# Patient Record
Sex: FEMALE | Race: WHITE | NOT HISPANIC OR LATINO | URBAN - METROPOLITAN AREA
[De-identification: names, ages, dates, MRNs, and addresses within clinical notes are randomized per-mention and may not be internally consistent; named-entity substitution may affect disease eponyms.]

---

## 2017-07-20 ENCOUNTER — OUTPATIENT (OUTPATIENT)
Dept: OUTPATIENT SERVICES | Facility: HOSPITAL | Age: 82
LOS: 1 days | Discharge: HOME | End: 2017-07-20

## 2017-07-20 DIAGNOSIS — I50.9 HEART FAILURE, UNSPECIFIED: ICD-10-CM

## 2017-07-20 DIAGNOSIS — E78.5 HYPERLIPIDEMIA, UNSPECIFIED: ICD-10-CM

## 2017-07-20 DIAGNOSIS — K21.9 GASTRO-ESOPHAGEAL REFLUX DISEASE WITHOUT ESOPHAGITIS: ICD-10-CM

## 2017-07-20 DIAGNOSIS — Z95.0 PRESENCE OF CARDIAC PACEMAKER: ICD-10-CM

## 2017-07-20 DIAGNOSIS — R00.1 BRADYCARDIA, UNSPECIFIED: ICD-10-CM

## 2017-07-20 DIAGNOSIS — I10 ESSENTIAL (PRIMARY) HYPERTENSION: ICD-10-CM

## 2017-07-20 DIAGNOSIS — Z01.818 ENCOUNTER FOR OTHER PREPROCEDURAL EXAMINATION: ICD-10-CM

## 2017-07-20 DIAGNOSIS — D68.8 OTHER SPECIFIED COAGULATION DEFECTS: ICD-10-CM

## 2017-07-20 DIAGNOSIS — R05 COUGH: ICD-10-CM

## 2017-07-20 DIAGNOSIS — M41.9 SCOLIOSIS, UNSPECIFIED: ICD-10-CM

## 2017-07-20 DIAGNOSIS — G62.9 POLYNEUROPATHY, UNSPECIFIED: ICD-10-CM

## 2017-07-20 DIAGNOSIS — Z88.0 ALLERGY STATUS TO PENICILLIN: ICD-10-CM

## 2017-07-26 ENCOUNTER — OUTPATIENT (OUTPATIENT)
Dept: OUTPATIENT SERVICES | Facility: HOSPITAL | Age: 82
LOS: 1 days | Discharge: HOME | End: 2017-07-26

## 2017-07-26 DIAGNOSIS — K21.9 GASTRO-ESOPHAGEAL REFLUX DISEASE WITHOUT ESOPHAGITIS: ICD-10-CM

## 2017-07-26 DIAGNOSIS — I10 ESSENTIAL (PRIMARY) HYPERTENSION: ICD-10-CM

## 2017-07-26 DIAGNOSIS — R00.1 BRADYCARDIA, UNSPECIFIED: ICD-10-CM

## 2017-07-26 DIAGNOSIS — I50.9 HEART FAILURE, UNSPECIFIED: ICD-10-CM

## 2017-07-26 DIAGNOSIS — Z88.0 ALLERGY STATUS TO PENICILLIN: ICD-10-CM

## 2017-07-26 DIAGNOSIS — E78.5 HYPERLIPIDEMIA, UNSPECIFIED: ICD-10-CM

## 2017-07-26 DIAGNOSIS — M41.9 SCOLIOSIS, UNSPECIFIED: ICD-10-CM

## 2017-07-26 DIAGNOSIS — G62.9 POLYNEUROPATHY, UNSPECIFIED: ICD-10-CM

## 2017-07-26 DIAGNOSIS — Z45.02 ENCOUNTER FOR ADJUSTMENT AND MANAGEMENT OF AUTOMATIC IMPLANTABLE CARDIAC DEFIBRILLATOR: ICD-10-CM

## 2017-07-26 DIAGNOSIS — R05 COUGH: ICD-10-CM

## 2018-10-03 ENCOUNTER — APPOINTMENT (OUTPATIENT)
Dept: UROLOGY | Facility: CLINIC | Age: 83
End: 2018-10-03

## 2019-05-15 ENCOUNTER — APPOINTMENT (OUTPATIENT)
Dept: CARDIOLOGY | Facility: CLINIC | Age: 84
End: 2019-05-15
Payer: MEDICARE

## 2019-05-15 PROCEDURE — 99213 OFFICE O/P EST LOW 20 MIN: CPT | Mod: 25

## 2019-05-15 PROCEDURE — 93290 INTERROG DEV EVAL ICPMS IP: CPT | Mod: 59

## 2019-05-15 PROCEDURE — 93284 PRGRMG EVAL IMPLANTABLE DFB: CPT | Mod: 59

## 2019-06-17 ENCOUNTER — EMERGENCY (EMERGENCY)
Facility: HOSPITAL | Age: 84
LOS: 0 days | Discharge: HOME | End: 2019-06-18
Attending: EMERGENCY MEDICINE | Admitting: EMERGENCY MEDICINE
Payer: MEDICARE

## 2019-06-17 VITALS
SYSTOLIC BLOOD PRESSURE: 175 MMHG | HEART RATE: 67 BPM | DIASTOLIC BLOOD PRESSURE: 90 MMHG | RESPIRATION RATE: 18 BRPM | OXYGEN SATURATION: 99 % | TEMPERATURE: 98 F

## 2019-06-17 DIAGNOSIS — E78.5 HYPERLIPIDEMIA, UNSPECIFIED: ICD-10-CM

## 2019-06-17 DIAGNOSIS — T14.90XA INJURY, UNSPECIFIED, INITIAL ENCOUNTER: ICD-10-CM
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
## 2019-06-17 DIAGNOSIS — I10 ESSENTIAL (PRIMARY) HYPERTENSION: ICD-10-CM
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
## 2019-06-17 DIAGNOSIS — R42 DIZZINESS AND GIDDINESS: ICD-10-CM

## 2019-06-17 DIAGNOSIS — S50.312A ABRASION OF LEFT ELBOW, INITIAL ENCOUNTER: ICD-10-CM

## 2019-06-17 DIAGNOSIS — Z95.0 PRESENCE OF CARDIAC PACEMAKER: ICD-10-CM
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
## 2019-06-17 DIAGNOSIS — S79.822A: ICD-10-CM

## 2019-06-17 LAB
ALBUMIN SERPL ELPH-MCNC: 4.4 G/DL — SIGNIFICANT CHANGE UP (ref 3.5–5.2)
ALP SERPL-CCNC: 51 U/L — SIGNIFICANT CHANGE UP (ref 30–115)
ALT FLD-CCNC: 18 U/L — SIGNIFICANT CHANGE UP (ref 0–41)
ANION GAP SERPL CALC-SCNC: 12 MMOL/L — SIGNIFICANT CHANGE UP (ref 7–14)
APPEARANCE UR: CLEAR — SIGNIFICANT CHANGE UP
APTT BLD: 32.1 SEC — SIGNIFICANT CHANGE UP (ref 27–39.2)
AST SERPL-CCNC: 22 U/L — SIGNIFICANT CHANGE UP (ref 0–41)
BACTERIA # UR AUTO: ABNORMAL /HPF
BASOPHILS # BLD AUTO: 0.08 K/UL — SIGNIFICANT CHANGE UP (ref 0–0.2)
BASOPHILS NFR BLD AUTO: 0.9 % — SIGNIFICANT CHANGE UP (ref 0–1)
BILIRUB SERPL-MCNC: 0.5 MG/DL — SIGNIFICANT CHANGE UP (ref 0.2–1.2)
BILIRUB UR-MCNC: NEGATIVE — SIGNIFICANT CHANGE UP
BUN SERPL-MCNC: 32 MG/DL — HIGH (ref 10–20)
CALCIUM SERPL-MCNC: 11.7 MG/DL — HIGH (ref 8.5–10.1)
CHLORIDE SERPL-SCNC: 102 MMOL/L — SIGNIFICANT CHANGE UP (ref 98–110)
CO2 SERPL-SCNC: 25 MMOL/L — SIGNIFICANT CHANGE UP (ref 17–32)
COLOR SPEC: YELLOW — SIGNIFICANT CHANGE UP
CREAT SERPL-MCNC: 0.9 MG/DL — SIGNIFICANT CHANGE UP (ref 0.7–1.5)
DIFF PNL FLD: NEGATIVE — SIGNIFICANT CHANGE UP
EOSINOPHIL # BLD AUTO: 0.16 K/UL — SIGNIFICANT CHANGE UP (ref 0–0.7)
EOSINOPHIL NFR BLD AUTO: 1.7 % — SIGNIFICANT CHANGE UP (ref 0–8)
EPI CELLS # UR: ABNORMAL /HPF
ETHANOL SERPL-MCNC: <10 MG/DL — SIGNIFICANT CHANGE UP
GLUCOSE SERPL-MCNC: 114 MG/DL — HIGH (ref 70–99)
GLUCOSE UR QL: NEGATIVE MG/DL — SIGNIFICANT CHANGE UP
HCT VFR BLD CALC: 38.3 % — SIGNIFICANT CHANGE UP (ref 37–47)
HGB BLD-MCNC: 12.8 G/DL — SIGNIFICANT CHANGE UP (ref 12–16)
IMM GRANULOCYTES NFR BLD AUTO: 0.2 % — SIGNIFICANT CHANGE UP (ref 0.1–0.3)
INR BLD: 0.96 RATIO — SIGNIFICANT CHANGE UP (ref 0.65–1.3)
KETONES UR-MCNC: NEGATIVE — SIGNIFICANT CHANGE UP
LACTATE SERPL-SCNC: 0.7 MMOL/L — SIGNIFICANT CHANGE UP (ref 0.5–2.2)
LEUKOCYTE ESTERASE UR-ACNC: NEGATIVE — SIGNIFICANT CHANGE UP
LIDOCAIN IGE QN: 82 U/L — HIGH (ref 7–60)
LYMPHOCYTES # BLD AUTO: 1.48 K/UL — SIGNIFICANT CHANGE UP (ref 1.2–3.4)
LYMPHOCYTES # BLD AUTO: 16 % — LOW (ref 20.5–51.1)
MCHC RBC-ENTMCNC: 30.1 PG — SIGNIFICANT CHANGE UP (ref 27–31)
MCHC RBC-ENTMCNC: 33.4 G/DL — SIGNIFICANT CHANGE UP (ref 32–37)
MCV RBC AUTO: 90.1 FL — SIGNIFICANT CHANGE UP (ref 81–99)
MONOCYTES # BLD AUTO: 0.51 K/UL — SIGNIFICANT CHANGE UP (ref 0.1–0.6)
MONOCYTES NFR BLD AUTO: 5.5 % — SIGNIFICANT CHANGE UP (ref 1.7–9.3)
NEUTROPHILS # BLD AUTO: 7.02 K/UL — HIGH (ref 1.4–6.5)
NEUTROPHILS NFR BLD AUTO: 75.7 % — HIGH (ref 42.2–75.2)
NITRITE UR-MCNC: NEGATIVE — SIGNIFICANT CHANGE UP
NRBC # BLD: 0 /100 WBCS — SIGNIFICANT CHANGE UP (ref 0–0)
PH UR: 6.5 — SIGNIFICANT CHANGE UP (ref 5–8)
PLATELET # BLD AUTO: 199 K/UL — SIGNIFICANT CHANGE UP (ref 130–400)
POTASSIUM SERPL-MCNC: 3.9 MMOL/L — SIGNIFICANT CHANGE UP (ref 3.5–5)
POTASSIUM SERPL-SCNC: 3.9 MMOL/L — SIGNIFICANT CHANGE UP (ref 3.5–5)
PROT SERPL-MCNC: 7.8 G/DL — SIGNIFICANT CHANGE UP (ref 6–8)
PROT UR-MCNC: 30 MG/DL
PROTHROM AB SERPL-ACNC: 11 SEC — SIGNIFICANT CHANGE UP (ref 9.95–12.87)
RBC # BLD: 4.25 M/UL — SIGNIFICANT CHANGE UP (ref 4.2–5.4)
RBC # FLD: 13.4 % — SIGNIFICANT CHANGE UP (ref 11.5–14.5)
SODIUM SERPL-SCNC: 139 MMOL/L — SIGNIFICANT CHANGE UP (ref 135–146)
SP GR SPEC: 1.02 — SIGNIFICANT CHANGE UP (ref 1.01–1.03)
TROPONIN T SERPL-MCNC: <0.01 NG/ML — SIGNIFICANT CHANGE UP
UROBILINOGEN FLD QL: 0.2 MG/DL — SIGNIFICANT CHANGE UP (ref 0.2–0.2)
WBC # BLD: 9.27 K/UL — SIGNIFICANT CHANGE UP (ref 4.8–10.8)
WBC # FLD AUTO: 9.27 K/UL — SIGNIFICANT CHANGE UP (ref 4.8–10.8)

## 2019-06-17 PROCEDURE — 73070 X-RAY EXAM OF ELBOW: CPT | Mod: 26,LT

## 2019-06-17 PROCEDURE — 73560 X-RAY EXAM OF KNEE 1 OR 2: CPT | Mod: 26,LT

## 2019-06-17 PROCEDURE — 93010 ELECTROCARDIOGRAM REPORT: CPT

## 2019-06-17 PROCEDURE — 99285 EMERGENCY DEPT VISIT HI MDM: CPT | Mod: GC,25

## 2019-06-17 PROCEDURE — 72125 CT NECK SPINE W/O DYE: CPT | Mod: 26

## 2019-06-17 PROCEDURE — 71045 X-RAY EXAM CHEST 1 VIEW: CPT | Mod: 26

## 2019-06-17 PROCEDURE — 73552 X-RAY EXAM OF FEMUR 2/>: CPT | Mod: 26,LT

## 2019-06-17 PROCEDURE — 73030 X-RAY EXAM OF SHOULDER: CPT | Mod: 26,LT

## 2019-06-17 PROCEDURE — 73502 X-RAY EXAM HIP UNI 2-3 VIEWS: CPT | Mod: 26,LT

## 2019-06-17 PROCEDURE — 70450 CT HEAD/BRAIN W/O DYE: CPT | Mod: 26

## 2019-06-17 PROCEDURE — 73060 X-RAY EXAM OF HUMERUS: CPT | Mod: 26,LT

## 2019-06-17 RX ORDER — SODIUM CHLORIDE 9 MG/ML
1000 INJECTION INTRAMUSCULAR; INTRAVENOUS; SUBCUTANEOUS ONCE
Refills: 0 | Status: COMPLETED | OUTPATIENT
Start: 2019-06-17 | End: 2019-06-17

## 2019-06-17 RX ORDER — ACETAMINOPHEN 500 MG
650 TABLET ORAL ONCE
Refills: 0 | Status: COMPLETED | OUTPATIENT
Start: 2019-06-17 | End: 2019-06-17

## 2019-06-17 RX ADMIN — Medication 650 MILLIGRAM(S): at 22:57

## 2019-06-17 RX ADMIN — SODIUM CHLORIDE 333.33 MILLILITER(S): 9 INJECTION INTRAMUSCULAR; INTRAVENOUS; SUBCUTANEOUS at 21:05

## 2019-06-17 NOTE — ED PROVIDER NOTE - CARE PLAN
Principal Discharge DX:	Fall from standing  Secondary Diagnosis:	Lightheaded Principal Discharge DX:	Fall from standing  Secondary Diagnosis:	Dizziness

## 2019-06-17 NOTE — ED PROVIDER NOTE - PHYSICAL EXAMINATION
Vital Signs: I have reviewed the initial vital signs.  Constitutional: NAD, well-nourished, appears stated age, no acute distress.  HEENT: Airway patent, moist MM, no erythema/swelling/deformity of oral structures. EOMI, PERRLA.  CV: regular rate, regular rhythm, well-perfused extremities, 2+ b/l DP and radial pulses equal.  Lungs: BCTA, no increased WOB.  ABD: NTND, no guarding or rebound, no pulsatile mass, no hernias.   MSK: Neck supple, nontender, nl ROM, no stepoff. Chest nontender. Back nontender in TLS spine or to b/l bony structures or flanks. Ext nontender, nl rom, no deformity.   INTEG: Skin warm, dry, no rash. +L lateral thigh eccymosis and hematoma. L elbow abrasion and ecchymosis  NEURO: A&Ox3, moving all extremities, normal speech  PSYCH: Calm, cooperative, normal affect and interaction.

## 2019-06-17 NOTE — ED ADULT NURSE NOTE - NSIMPLEMENTINTERV_GEN_ALL_ED
Implemented All Fall with Harm Risk Interventions:  [place] to call system. Call bell, personal items and telephone within reach. Instruct patient to call for assistance. Room bathroom lighting operational. Non-slip footwear when patient is off stretcher. Physically safe environment: no spills, clutter or unnecessary equipment. Stretcher in lowest position, wheels locked, appropriate side rails in place. Provide visual cue, wrist band, yellow gown, etc. Monitor gait and stability. Monitor for mental status changes and reorient to person, place, and time. Review medications for side effects contributing to fall risk. Reinforce activity limits and safety measures with patient and family. Provide visual clues: red socks.

## 2019-06-17 NOTE — ED PROVIDER NOTE - NS ED MD EKG INTERPRETATION 1
Transition of care performed with sharing of clinical summary sinus @102, normal axis/interval, normal st/t

## 2019-06-17 NOTE — ED ADULT NURSE NOTE - OBJECTIVE STATEMENT
Pt BIBA s/p fall today, pt states she felt dizzy when she was walking towards door for [employer] and fell on top of her walker, pt complains of pain to left arm. pt denies any sob/chest pain. PT currently a&ox4, speaking coherently, complains of continuos dizziness, denies head injury or loc, pt picked up by musa and niece. pt on cardiac monitor, continuos pulse ox, lab work done and iv inserted. pt presents with ecchymosis to left hip. will continue to monitor.

## 2019-06-17 NOTE — ED PROVIDER NOTE - CLINICAL SUMMARY MEDICAL DECISION MAKING FREE TEXT BOX
89f w dizziness and fall. nontoxic appearing, n/v intact, nonfocal neuro. Labs, EKG, & imaging reviewed. Analgesia & IV fluids given with improvement in symptoms. Patient admitted to EDOU for cardiac eval & further care/management.

## 2019-06-17 NOTE — ED PROVIDER NOTE - OBJECTIVE STATEMENT
89yF with PMH pacemaker for bradycardia (remote), HTN, HLD, BL knee replacement and L hip replacement p/w fall. pt stood up to get to door around 5pm and felt "woozy", walked over and then legs gave out. she told her daughter she didn't feel good upon arrival and decision was made to come to ed. no AC, no HT, no LOC. +L lateral thigh pain and ecchymosis and L elbow pain and ecchymosis/abrasion. no neck pain. currently asx. no cp or abnormal pacer firing. no palpitations 89yF with PMH pacemaker for bradycardia (remote), HTN, HLD, BL knee replacement and L hip replacement p/w fall. pt stood up to get to door around 5pm and felt "woozy", walked over and then legs gave out. she told her daughter she didn't feel good upon arrival and decision was made to come to ed. no AC, no HT, no LOC. +L lateral thigh pain and ecchymosis and L elbow pain and ecchymosis/abrasion. no neck pain. currently asx. no cp or abnormal pacer firing. no palpitations. card - Bekheit/divore

## 2019-06-17 NOTE — ED ADULT NURSE NOTE - PMH
AICD (automatic cardioverter/defibrillator) present    High cholesterol    HTN (hypertension)    Pacemaker

## 2019-06-17 NOTE — ED PROVIDER NOTE - ATTENDING CONTRIBUTION TO CARE
89f w HTN, HLD, PPM for bradycardia presents after getting up to open door and feeling lightheaded and buckling. Pt reports that she landed on her L side w injury to L femur & humerus. Pain is sharp, moderate, constant, no radiating, no exacerbating/alleviating. Pt on ASA. Pt reports feeling persistently dizzy. No head/neck injury, no actual LOC.    Review of Systems  Constitutional:  No fever or chills.  Eyes:  No visual changes, eye pain, or discharge.  ENMT:  No nasal congestion, discharge, or throat pain.   Cardiac:  No chest pain, syncope, or edema.  Respiratory:  No dyspnea, wheezing, or cough. No hemoptysis.  GI:  No vomiting, diarrhea, or abdominal pain. No melena or hematochezia.  :  No dysuria or hematuria.   Musculoskeletal:  See HPI  Skin:  No skin rash, jaundice, or lesions.  Neuro:  No headache, loss of sensation, or focal weakness.  No change in mental status. +Dizziness    Physical Exam  General: Awake, alert, NAD, elderly/frail, NCAT, no skull/facial tender, no step-offs, no raccoon/muniz, non-toxic appearing  Eyes: PERRL, EOMI, no nystagmus/icterus, lids and conjunctivae are normal  ENT: External inspection normal, pink/moist membranes, pharynx normal  CV: S1S2, regular rate and rhythm, no murmur/gallops/rubs, no JVD, 2+ pulses b/l, no edema/cords/homans, warm/well-perfused  Respiratory: Normal respiratory rate/effort, no respiratory distress, normal voice, speaking full sentences, lungs clear to auscultation b/l, no wheezing/rales/rhonchi, no retractions, no stridor  Abdomen: Soft abdomen, no tender/distended/guarding/rebound, no CVA tender  Musculoskeletal: FROM all 4 extremities, N/V intact, pelvis stable, no TLS spinal tender/deform/step-offs, L humerus mid-/swelling, no deformity. L femur mid-/swelling, no deformity. No other hayde tender/deform  Neck: FROM neck, supple, no meningismus, trachea midline, no JVD, no cspine tender/step-offs  Integumentary: Color normal for race, warm and dry, no rash  Neuro: Oriented x3, CN 2-12 grossly intact, normal motor, normal sensory, normal cerebellar, GCS15  Psych: Oriented x3, mood normal, affect normal     89f w dizziness and fall. nontoxic appearing, n/v intact, nonfocal neuro. --Labs, EKG, CT's, XR's. --IV fluids, Analgesia/antiemetics as needed, observe/re-assess.

## 2019-06-18 VITALS
RESPIRATION RATE: 18 BRPM | SYSTOLIC BLOOD PRESSURE: 145 MMHG | HEART RATE: 65 BPM | TEMPERATURE: 98 F | OXYGEN SATURATION: 99 % | DIASTOLIC BLOOD PRESSURE: 88 MMHG

## 2019-06-18 LAB
BLD GP AB SCN SERPL QL: SIGNIFICANT CHANGE UP
TROPONIN T SERPL-MCNC: <0.01 NG/ML — SIGNIFICANT CHANGE UP

## 2019-06-18 PROCEDURE — 99234 HOSP IP/OBS SM DT SF/LOW 45: CPT

## 2019-06-18 PROCEDURE — 93010 ELECTROCARDIOGRAM REPORT: CPT

## 2019-06-18 PROCEDURE — 93306 TTE W/DOPPLER COMPLETE: CPT | Mod: 26

## 2019-06-18 NOTE — ED CDU PROVIDER INITIAL DAY NOTE - NEURO NEGATIVE STATEMENT, MLM
no loss of consciousness, no gait abnormality, no headache, no sensory deficits, and no weakness. + dizziness.

## 2019-06-18 NOTE — ED ADULT NURSE REASSESSMENT NOTE - NS ED NURSE REASSESS COMMENT FT1
Patient resting comfortably in bed. VSS. Denies any pain or discomfort at this time. Education provided on importance of using CB prior to getting OOB. Patient verbalized understanding of teaching. MA on. Will continue to monitor.
pt moved into cc6 as OBS pt. attached to cardiac and resp monitoring. pt fed and placed on hospital bed. offers no complaints after tylenol. family aware. will continue care.
pt resting in bed comfortably at this time. sleeping, remains on cardiac and resp monitoring. VS stable. will continue care.

## 2019-06-18 NOTE — ED CDU PROVIDER DISPOSITION NOTE - CARE PROVIDER_API CALL
Matias Aguilar)  Cardiology; Interventional Cardiology  [address], [address]  [place] [address]  Phone: [phone]  Fax: [phone]  Follow Up Time:     Sakina Helm)  Cardiology; Internal Medicine  [address] [place] [address], [facility] [address]  [place] [address]  Phone: [phone]  Fax: [phone]  Follow Up Time:

## 2019-06-18 NOTE — ED CDU PROVIDER INITIAL DAY NOTE - OBJECTIVE STATEMENT
88 y/o female with pmh of ppm, htn, hld, pt. states she was at home, she got up to go open the door and she started to feel dizzy, her legs gave out and she fell on her left side. pt. denies head injury, loc, not on anticoagulation. denies cp, sob, nausea, vomiting. cardiologist dr. Geller and dr. Concepcion

## 2019-06-18 NOTE — ED CDU PROVIDER DISPOSITION NOTE - CLINICAL COURSE
Patient was sent to EDOU for further evaluation of dizziness anstatus post fall, Has remained in no distress and with stable vitals, ekgs paced rhythm, enzymes negative, Multiple X- rays no fracture, CT head and CT C- spine negative, Patient feelin better and dizziness better, discharged in stable condition and with family membres

## 2019-10-09 ENCOUNTER — APPOINTMENT (OUTPATIENT)
Dept: CARDIOLOGY | Facility: CLINIC | Age: 84
End: 2019-10-09
Payer: MEDICARE

## 2019-10-09 PROBLEM — E78.00 PURE HYPERCHOLESTEROLEMIA, UNSPECIFIED: Chronic | Status: ACTIVE | Noted: 2019-06-17

## 2019-10-09 PROBLEM — I10 ESSENTIAL (PRIMARY) HYPERTENSION: Chronic | Status: ACTIVE | Noted: 2019-06-17

## 2019-10-09 PROBLEM — Z95.0 PRESENCE OF CARDIAC PACEMAKER: Chronic | Status: ACTIVE | Noted: 2019-06-17

## 2019-10-09 PROBLEM — Z95.810 PRESENCE OF AUTOMATIC (IMPLANTABLE) CARDIAC DEFIBRILLATOR: Chronic | Status: ACTIVE | Noted: 2019-06-17

## 2019-10-09 PROCEDURE — 99213 OFFICE O/P EST LOW 20 MIN: CPT | Mod: 25

## 2019-10-09 PROCEDURE — 93290 INTERROG DEV EVAL ICPMS IP: CPT | Mod: 59

## 2019-10-09 PROCEDURE — 93284 PRGRMG EVAL IMPLANTABLE DFB: CPT | Mod: 59

## 2019-10-23 ENCOUNTER — APPOINTMENT (OUTPATIENT)
Dept: UROLOGY | Facility: CLINIC | Age: 84
End: 2019-10-23
Payer: MEDICARE

## 2019-10-23 DIAGNOSIS — Z78.9 OTHER SPECIFIED HEALTH STATUS: ICD-10-CM

## 2019-10-23 PROCEDURE — 99204 OFFICE O/P NEW MOD 45 MIN: CPT

## 2019-12-04 ENCOUNTER — APPOINTMENT (OUTPATIENT)
Dept: UROLOGY | Facility: CLINIC | Age: 84
End: 2019-12-04

## 2019-12-04 ENCOUNTER — OUTPATIENT (OUTPATIENT)
Dept: OUTPATIENT SERVICES | Facility: HOSPITAL | Age: 84
LOS: 1 days | Discharge: HOME | End: 2019-12-04

## 2019-12-04 DIAGNOSIS — E11.9 TYPE 2 DIABETES MELLITUS WITHOUT COMPLICATIONS: ICD-10-CM

## 2019-12-04 DIAGNOSIS — I25.10 ATHEROSCLEROTIC HEART DISEASE OF NATIVE CORONARY ARTERY WITHOUT ANGINA PECTORIS: ICD-10-CM

## 2019-12-04 DIAGNOSIS — R78.89 FINDING OF OTHER SPECIFIED SUBSTANCES, NOT NORMALLY FOUND IN BLOOD: ICD-10-CM

## 2019-12-04 DIAGNOSIS — Z02.9 ENCOUNTER FOR ADMINISTRATIVE EXAMINATIONS, UNSPECIFIED: ICD-10-CM

## 2019-12-04 DIAGNOSIS — B58.81 TOXOPLASMA MYOCARDITIS: ICD-10-CM

## 2019-12-09 ENCOUNTER — OUTPATIENT (OUTPATIENT)
Dept: OUTPATIENT SERVICES | Facility: HOSPITAL | Age: 84
LOS: 1 days | Discharge: HOME | End: 2019-12-09

## 2019-12-09 DIAGNOSIS — E11.9 TYPE 2 DIABETES MELLITUS WITHOUT COMPLICATIONS: ICD-10-CM

## 2019-12-09 DIAGNOSIS — R78.89 FINDING OF OTHER SPECIFIED SUBSTANCES, NOT NORMALLY FOUND IN BLOOD: ICD-10-CM

## 2019-12-09 DIAGNOSIS — B58.81 TOXOPLASMA MYOCARDITIS: ICD-10-CM

## 2019-12-09 DIAGNOSIS — I25.10 ATHEROSCLEROTIC HEART DISEASE OF NATIVE CORONARY ARTERY WITHOUT ANGINA PECTORIS: ICD-10-CM

## 2020-01-15 ENCOUNTER — APPOINTMENT (OUTPATIENT)
Dept: CARDIOLOGY | Facility: CLINIC | Age: 85
End: 2020-01-15

## 2020-05-04 ENCOUNTER — APPOINTMENT (OUTPATIENT)
Dept: CARDIOLOGY | Facility: CLINIC | Age: 85
End: 2020-05-04

## 2020-07-20 ENCOUNTER — RECORD ABSTRACTING (OUTPATIENT)
Age: 85
End: 2020-07-20

## 2020-09-28 ENCOUNTER — APPOINTMENT (OUTPATIENT)
Dept: CARDIOLOGY | Facility: CLINIC | Age: 85
End: 2020-09-28
Payer: MEDICARE

## 2020-09-28 VITALS — SYSTOLIC BLOOD PRESSURE: 131 MMHG | DIASTOLIC BLOOD PRESSURE: 79 MMHG

## 2020-09-28 VITALS
HEIGHT: 61 IN | WEIGHT: 134 LBS | HEART RATE: 60 BPM | DIASTOLIC BLOOD PRESSURE: 80 MMHG | SYSTOLIC BLOOD PRESSURE: 140 MMHG | BODY MASS INDEX: 25.3 KG/M2 | TEMPERATURE: 98.4 F

## 2020-09-28 DIAGNOSIS — Z00.00 ENCOUNTER FOR GENERAL ADULT MEDICAL EXAMINATION W/OUT ABNORMAL FINDINGS: ICD-10-CM

## 2020-09-28 PROCEDURE — 93000 ELECTROCARDIOGRAM COMPLETE: CPT

## 2020-09-28 PROCEDURE — 93290 INTERROG DEV EVAL ICPMS IP: CPT | Mod: 26

## 2020-09-28 PROCEDURE — 99213 OFFICE O/P EST LOW 20 MIN: CPT | Mod: 25

## 2020-09-28 RX ORDER — CARVEDILOL 3.12 MG/1
TABLET, FILM COATED ORAL
Refills: 0 | Status: DISCONTINUED | COMMUNITY
End: 2020-09-28

## 2020-09-28 RX ORDER — ESOMEPRAZOLE MAGNESIUM 5 MG/1
GRANULE, DELAYED RELEASE ORAL
Refills: 0 | Status: DISCONTINUED | COMMUNITY
End: 2020-09-28

## 2020-09-28 RX ORDER — SOLIFENACIN SUCCINATE 5 MG/1
5 TABLET ORAL
Qty: 30 | Refills: 5 | Status: DISCONTINUED | COMMUNITY
Start: 2019-10-23 | End: 2020-09-28

## 2020-09-28 NOTE — ASSESSMENT
[FreeTextEntry1] : EKG AFIB 60/MIN BIV PACING\par NO EVENTS\par CONTROLLED BP \par PACING 99.6%\par NO ON ANTICOAGULATION B/C OF RECURRENT FALLS AND PATIENT ALSO REFUSED TO TAKE ANTICOAGULATION\par \par CONTINUE THE SAME\par F/U 6 MONTHS

## 2020-09-28 NOTE — HISTORY OF PRESENT ILLNESS
[de-identified] : PPM MEDTRONIC FOR CHB 2010\par A.FIB SINCE 2012medtronic 2010\par UPGRADED TO BIV SEP 19 TH 2012\par C/O ARTHTHRITIS\par HISTORY OF RECURRENT FALLS ,? CONFUSION WHEN WAS ON CYMBLTA\par DID NOT START ANTICOAGULATION BECAUSE OF FALLS 5/21/14\par NO C/O TODAY 10/1/14\par AGAIN DISCUSSED ANTICOGULATION WITH NIECE AND RISKS OF STROKES,PT REFUSED\par LEG WEAKNESS TIREDNESS IN AM LOW BACK PAIN 1/12/15\par C/O LEG WEAKNESS UNABLE TO WALK 6/15/15\par CONTIUES TO C/O LEG WEAKNESS AND STATES WHEN GETS OOB IN AM AND GETS WASHED UP BECOMES VERY TIRED 11/16/15\par ANKLE SWELLING BUT NO CARDIAC C/O 3/14/16\par POST FALL WHILE TRYING TO GRAP ON A CHAIR 3 WEEKS AGO,NOT PRECEDE NOR FOLLOWED BY LOSS OF CONSCIOSNESS,DENIES ANY CARDIAC C/O TODAY OK 8-31-16\par \par PT FEELING SAD - HAD TO PUT DOG TO SLEEP, STRUGGLING WITH INCONTINENCE 12-14-16\par \par NO CARDIAC C/O 4/12/17\par \par C/O LEG SWELLING OTHERWISE NO CARDIAC C/O 7/12/17\par \par NO CARDIAC C/O 9/18/17\par \par POST R HAND CARPAL TUNNEL SURGERY DEC 6TH 2017 NO CARDIAC C/O 1/31/18\par \par NO CARDIAC C/O 5/14/18\par \par NO C/O 9/17/18\par \par MINOR CHEST PAIN 1/14/19\par \par TIRED IN AM NO CARDIAC C/O 5/15/19\par \par NO CARDIAC C/O BUT HAD RECURRENT FALLS 3 TIMES SINCE HER LAST VISIT LOSING BALANCE BUT NO DIZZINESS 10/09/19 \par \par C/O BACK PAIN NO CARDIAC C/O   9/28/20

## 2020-09-28 NOTE — PROCEDURE
[Complete Heart Block] : complete heart block [CRT-D] : Cardiac resynchronization therapy defibrillator [VVIR] : VVIR [Longevity: ___ months] : The estimated remaining battery life is [unfilled] months [Sensing Amplitude ___mv] : sensing amplitude was [unfilled] mv [Lead Imp:  ___ohms] : lead impedance was [unfilled] ohms [___V @] : [unfilled] V [___ ms] : [unfilled] ms [Sense ___ %] : Sense [unfilled]% [Pace ___ %] : Pace [unfilled]% [de-identified] : MEDTRONIC [de-identified] : VIVA XT CRTD [de-identified] : [id number] [de-identified] : 7-26-17 [de-identified] : 60 [de-identified] : no events

## 2020-09-28 NOTE — PHYSICAL EXAM
[General Appearance - Well Developed] : well developed [Normal Appearance] : normal appearance [Well Groomed] : well groomed [General Appearance - Well Nourished] : well nourished [No Deformities] : no deformities [General Appearance - In No Acute Distress] : no acute distress [Systolic grade ___/6] : A grade [unfilled]/6 systolic murmur was heard. [] : no respiratory distress [Respiration, Rhythm And Depth] : normal respiratory rhythm and effort [Exaggerated Use Of Accessory Muscles For Inspiration] : no accessory muscle use [Auscultation Breath Sounds / Voice Sounds] : lungs were clear to auscultation bilaterally [FreeTextEntry1] : STASIS +++ VARICOSE VEINS++

## 2021-04-05 ENCOUNTER — APPOINTMENT (OUTPATIENT)
Dept: CARDIOLOGY | Facility: CLINIC | Age: 86
End: 2021-04-05

## 2021-05-12 ENCOUNTER — APPOINTMENT (OUTPATIENT)
Dept: CARDIOLOGY | Facility: CLINIC | Age: 86
End: 2021-05-12
Payer: MEDICARE

## 2021-05-12 ENCOUNTER — NON-APPOINTMENT (OUTPATIENT)
Age: 86
End: 2021-05-12

## 2021-05-12 PROCEDURE — 93295 DEV INTERROG REMOTE 1/2/MLT: CPT

## 2021-05-12 PROCEDURE — 93296 REM INTERROG EVL PM/IDS: CPT

## 2021-05-27 ENCOUNTER — APPOINTMENT (OUTPATIENT)
Dept: GERIATRICS | Facility: HOME HEALTH | Age: 86
End: 2021-05-27
Payer: MEDICARE

## 2021-05-27 DIAGNOSIS — Z78.9 OTHER SPECIFIED HEALTH STATUS: ICD-10-CM

## 2021-05-27 DIAGNOSIS — Z87.891 PERSONAL HISTORY OF NICOTINE DEPENDENCE: ICD-10-CM

## 2021-05-27 PROCEDURE — 99442: CPT | Mod: 95

## 2021-05-27 RX ORDER — GABAPENTIN 300 MG/1
300 CAPSULE ORAL
Refills: 0 | Status: DISCONTINUED | COMMUNITY
End: 2021-05-27

## 2021-05-27 RX ORDER — SIMVASTATIN 80 MG/1
TABLET, FILM COATED ORAL
Refills: 0 | Status: DISCONTINUED | COMMUNITY
End: 2021-05-27

## 2021-05-27 NOTE — END OF VISIT
[] : Resident [FreeTextEntry3] : REVIEWED WITH CANDICE TEAM; and also called nephew directly - Amaris at [phone]; he coordinates responsibilities for care of pt with cousin Yamilka who is niece at [phone] number above; patient hemodynamically stable and is overall feelilng well; will plan for going for in person visit this summer.; concur with diagnoses and treatment plan above.

## 2021-05-27 NOTE — ASSESSMENT
[FreeTextEntry1] : This is a 89 year old F patient with past medical Hx of Afib s/p Biv ICD/PPM, DLD and urinary incontinence wanted to establish care.\par \par #AFib s/p Biv ICD\par -Patient used to follow up with Dr. Winter \par -Last interrogation was in 9/2020. Almost entirely dependent on the pacemaker \par -Continue Coreg for now \par -Not on AC for recurrent Falls \par \par #HTN\par -Continue Coreg 25 mg BID \par -Continue Diuretics\par -Blood pressure readings at home. 150s systolic, would not increase the BP medications \par \par #DLD\par -Continue Statin for now \par \par #Urinary incontinence \par -Its functional incontinence, no intervention from urology stand point\par \par #HCM\par -Follow up in 1 year\par -Blood work up before next visit

## 2021-05-27 NOTE — HISTORY OF PRESENT ILLNESS
[Home] : at home, [unfilled] , at the time of the visit. [Medical Office: ([facility])___] : at the medical office located in  [Other:____] : [unfilled] [FreeTextEntry1] : This is a 89 year old F patient with past medical Hx of Afib s/p Biv ICD/PPM, DLD and urinary incontinence wanted to establish care. Patient is feeling well with no acute complaints

## 2021-05-27 NOTE — REVIEW OF SYSTEMS
[Incontinence] : incontinence [Fever] : no fever [Chills] : no chills [Feeling Poorly] : not feeling poorly [Feeling Tired] : not feeling tired [Abdominal Pain] : no abdominal pain [Vomiting] : no vomiting [Constipation] : no constipation [Dysuria] : no dysuria [Pelvic Pain] : no pelvic pain

## 2021-06-16 ENCOUNTER — APPOINTMENT (OUTPATIENT)
Dept: CARDIOLOGY | Facility: CLINIC | Age: 86
End: 2021-06-16
Payer: MEDICARE

## 2021-06-16 VITALS
WEIGHT: 120 LBS | TEMPERATURE: 97.9 F | SYSTOLIC BLOOD PRESSURE: 173 MMHG | HEART RATE: 60 BPM | HEIGHT: 61 IN | BODY MASS INDEX: 22.66 KG/M2 | DIASTOLIC BLOOD PRESSURE: 82 MMHG

## 2021-06-16 DIAGNOSIS — I48.91 UNSPECIFIED ATRIAL FIBRILLATION: ICD-10-CM

## 2021-06-16 PROCEDURE — 93284 PRGRMG EVAL IMPLANTABLE DFB: CPT

## 2021-06-16 PROCEDURE — 93290 INTERROG DEV EVAL ICPMS IP: CPT | Mod: 26

## 2021-06-16 PROCEDURE — 99214 OFFICE O/P EST MOD 30 MIN: CPT

## 2021-06-16 PROCEDURE — 93000 ELECTROCARDIOGRAM COMPLETE: CPT | Mod: 59

## 2021-06-16 NOTE — ASSESSMENT
[FreeTextEntry1] : # Chronic AFib \par -  We discussed initiation of anticoagulation but niece and pt do not wish to start OAC and understand that the patient is at risk for stroke.\par - Cont Aspirin\par \par # CHB s/p BiV ICD\par -  Capped RA lead. Normal functioning BiV ICD. \par - Remote monitor is set up and patient is transmitting. Optivol elevated. Does not appear vol overloaded currently. \par - Repeat echo if none done recently. If EF is low, consider adding ACEi.\par \par # HTN\par - BP elevated\par - 2g Na diet enforced\par - Follow up with cardiologist\par \par I have also advised the patient to go to the nearest emergency room if she experiences any chest pain, dyspnea, syncope, or has any other compelling symptoms.\par \par Follow up in 6 mo for in office interrogation with NP.

## 2021-06-16 NOTE — PHYSICAL EXAM
[Normal Appearance] : normal appearance [Well Groomed] : well groomed [No Deformities] : no deformities [General Appearance - In No Acute Distress] : no acute distress [Heart Rate And Rhythm] : heart rate and rhythm were normal [Heart Sounds] : normal S1 and S2 [Edema] : no peripheral edema present [] : no respiratory distress [Exaggerated Use Of Accessory Muscles For Inspiration] : no accessory muscle use [Respiration, Rhythm And Depth] : normal respiratory rhythm and effort [Auscultation Breath Sounds / Voice Sounds] : lungs were clear to auscultation bilaterally [Left Infraclavicular] : left infraclavicular area [Well-Healed] : well-healed [Abdomen Soft] : soft [Nail Clubbing] : no clubbing of the fingernails [FreeTextEntry1] : sitting in wheelchair

## 2021-06-16 NOTE — PROCEDURE
[Complete Heart Block] : complete heart block [CRT-D] : Cardiac resynchronization therapy defibrillator [VVIR] : VVIR [Longevity: ___ months] : The estimated remaining battery life is [unfilled] months [Threshold Testing Performed] : Threshold testing was performed [Sensing Amplitude ___mv] : sensing amplitude was [unfilled] mv [Lead Imp:  ___ohms] : lead impedance was [unfilled] ohms [___V @] : [unfilled] V [___ ms] : [unfilled] ms [Programmed for Longevity] : output reprogrammed for improved battery longevity [Pace ___ %] : Pace [unfilled]% [See Device Printout] : See device printout [de-identified] : AFib [de-identified] : Medtronic [de-identified] : [id number] [de-identified] : [id number] [de-identified] : 7/26/2017 [de-identified] : 60 [de-identified] : NO EVENTS.\par Pulmonary congestion elevated since Jan 26 and ongoing

## 2021-06-16 NOTE — HISTORY OF PRESENT ILLNESS
[de-identified] : \par Cardiologist: Dr. Aguilar\par \par 89 yo F with history of CHB s/p DC PPM (4/30/2010) s/p BiV ICD upgrade (9/19/2012 by Dr. Cordova with capped RA and RV pacing lead), s/p BiV ICD gen change (2017) chronic AFib (not on AC due to history of recurrent falls), here for routine BiV ICD interrogation.

## 2021-06-25 ENCOUNTER — APPOINTMENT (OUTPATIENT)
Dept: GERIATRICS | Facility: HOME HEALTH | Age: 86
End: 2021-06-25
Payer: MEDICARE

## 2021-06-25 VITALS
TEMPERATURE: 98.2 F | HEART RATE: 60 BPM | OXYGEN SATURATION: 98 % | RESPIRATION RATE: 18 BRPM | DIASTOLIC BLOOD PRESSURE: 62 MMHG | SYSTOLIC BLOOD PRESSURE: 112 MMHG

## 2021-06-25 DIAGNOSIS — Z86.79 PERSONAL HISTORY OF OTHER DISEASES OF THE CIRCULATORY SYSTEM: ICD-10-CM

## 2021-06-25 PROCEDURE — 99348 HOME/RES VST EST LOW MDM 30: CPT

## 2021-06-25 NOTE — PHYSICAL EXAM
[General Appearance - Alert] : alert [General Appearance - In No Acute Distress] : in no acute distress [General Appearance - Well Nourished] : well nourished [General Appearance - Well Developed] : well developed [Sclera] : the sclera and conjunctiva were normal [Normal Oral Mucosa] : normal oral mucosa [No Oral Pallor] : no oral pallor [No Oral Cyanosis] : no oral cyanosis [Outer Ear] : the ears and nose were normal in appearance [Hearing Threshold Finger Rub Not [place]] : hearing was normal [Examination Of The Oral Cavity] : the lips and gums were normal [Neck Appearance] : the appearance of the neck was normal [Neck Cervical Mass (___cm)] : no neck mass was observed [Jugular Venous Distention Increased] : there was no jugular-venous distention [Respiration, Rhythm And Depth] : normal respiratory rhythm and effort [Auscultation Breath Sounds / Voice Sounds] : lungs were clear to auscultation bilaterally [Exaggerated Use Of Accessory Muscles For Inspiration] : no accessory muscle use [Edema] : there was no peripheral edema [Abdomen Soft] : soft [Abdomen Tenderness] : non-tender [Nail Clubbing] : no clubbing  or cyanosis of the fingernails [Involuntary Movements] : no involuntary movements were seen [Skin Lesions] : no skin lesions [] : no rash [Cranial Nerves] : cranial nerves 2-12 were intact [No Focal Deficits] : no focal deficits [FreeTextEntry1] : Confused

## 2021-06-25 NOTE — ASSESSMENT
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 Nahun Guido

## 2021-06-25 NOTE — HISTORY OF PRESENT ILLNESS
[FreeTextEntry1] : Patient seen and examined at home.  Patient is homebound 2/2 generalized weakness/debility.  Nephew and HHA present during visit.  All report patient at baseline.  No acute complaints.  No CP/SOB. No abdominal complaints with good appetite and regular BM's.  No urinary complaints.  No recent falls.  No skin wounds.  Patient is WC bound.  Medications reviewed and reconciled. Nephew relating s/s of AD, confusion, agitation at times.  Patient answers questions but is A/O x 2.  Needs Neuro FU

## 2021-08-04 ENCOUNTER — APPOINTMENT (OUTPATIENT)
Dept: GERIATRICS | Facility: HOME HEALTH | Age: 86
End: 2021-08-04
Payer: MEDICARE

## 2021-08-04 VITALS
OXYGEN SATURATION: 97 % | SYSTOLIC BLOOD PRESSURE: 114 MMHG | DIASTOLIC BLOOD PRESSURE: 62 MMHG | TEMPERATURE: 97.6 F | RESPIRATION RATE: 16 BRPM | HEART RATE: 62 BPM

## 2021-08-04 DIAGNOSIS — F98.0 ENURESIS NOT DUE TO A SUBSTANCE OR KNOWN PHYSIOLOGICAL CONDITION: ICD-10-CM

## 2021-08-04 DIAGNOSIS — R39.15 URGENCY OF URINATION: ICD-10-CM

## 2021-08-04 PROCEDURE — 99348 HOME/RES VST EST LOW MDM 30: CPT

## 2021-08-04 NOTE — ASSESSMENT
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  Will not follow regularly \par - c/w PT/OT\par

## 2021-08-04 NOTE — PHYSICAL EXAM
[Well Nourished] : well nourished [Well Developed] : well developed [Normal Oral Mucosa] : normal oral mucosa [No Oral Pallor] : no oral pallor [No Oral Cyanosis] : no oral cyanosis [Normal Outer Ear/Nose] : the ears and nose were normal in appearance [Normal Lips/Gums] : the lips and gums were normal [Edema] : edema was not present [Abdomen Tenderness] : non-tender [Abdomen Soft] : soft [No Clubbing, Cyanosis] : no clubbing or cyanosis of the fingernails [Involuntary Movements] : no involuntary movements were seen [] : no rash [Skin Lesions] : no skin lesions [No Focal Deficits] : no focal deficits [Cranial Nerves Intact] : cranial nerves 2-12 were intact [Normal] : the appearance was normal, neck was supple [JVD] : there was jugular-venous distention [de-identified] : irr irr [de-identified] : Alert and answers questions

## 2021-08-04 NOTE — HISTORY OF PRESENT ILLNESS
[FreeTextEntry1] : Patient seen and examined at home.  Patient is homebound 2/2 generalized weakness/debility.  HHA and Son present during visit.  All report patient at baseline.  No acute complaints.  No CP/SOB. No abdominal complaints with good appetite and regular BM's.  No urinary complaints.  No recent falls.  No skin wounds.  Followed with Neuro, no medication changes.

## 2021-08-11 ENCOUNTER — APPOINTMENT (OUTPATIENT)
Dept: CARDIOLOGY | Facility: CLINIC | Age: 86
End: 2021-08-11
Payer: MEDICARE

## 2021-08-11 ENCOUNTER — NON-APPOINTMENT (OUTPATIENT)
Age: 86
End: 2021-08-11

## 2021-08-11 PROCEDURE — 93298 REM INTERROG DEV EVAL SCRMS: CPT

## 2021-08-11 PROCEDURE — G2066: CPT

## 2021-10-26 ENCOUNTER — APPOINTMENT (OUTPATIENT)
Dept: NEUROLOGY | Facility: CLINIC | Age: 86
End: 2021-10-26

## 2021-10-29 ENCOUNTER — APPOINTMENT (OUTPATIENT)
Dept: GERIATRICS | Facility: HOME HEALTH | Age: 86
End: 2021-10-29

## 2021-11-02 ENCOUNTER — NEW REFERRAL (OUTPATIENT)
Dept: URBAN - METROPOLITAN AREA CLINIC 109 | Facility: CLINIC | Age: 86
End: 2021-11-02

## 2021-11-02 DIAGNOSIS — D31.31: ICD-10-CM

## 2021-11-02 DIAGNOSIS — H35.3223: ICD-10-CM

## 2021-11-02 DIAGNOSIS — H35.3211: ICD-10-CM

## 2021-11-02 DIAGNOSIS — H35.033: ICD-10-CM

## 2021-11-02 DIAGNOSIS — H43.813: ICD-10-CM

## 2021-11-02 DIAGNOSIS — H35.433: ICD-10-CM

## 2021-11-02 PROCEDURE — 92202 OPSCPY EXTND ON/MAC DRAW: CPT

## 2021-11-02 PROCEDURE — 76512 OPH US DX B-SCAN: CPT

## 2021-11-02 PROCEDURE — 99204 OFFICE O/P NEW MOD 45 MIN: CPT

## 2021-11-02 PROCEDURE — 92134 CPTRZ OPH DX IMG PST SGM RTA: CPT

## 2021-11-02 ASSESSMENT — VISUAL ACUITY
OD_SC: 3/200
OS_SC: 10/200

## 2021-11-02 ASSESSMENT — TONOMETRY
OS_IOP_MMHG: 14
OD_IOP_MMHG: 12

## 2021-11-03 ENCOUNTER — FOLLOW UP (OUTPATIENT)
Dept: URBAN - METROPOLITAN AREA CLINIC 51 | Facility: CLINIC | Age: 86
End: 2021-11-03

## 2021-11-03 DIAGNOSIS — H35.3211: ICD-10-CM

## 2021-11-03 DIAGNOSIS — H43.11: ICD-10-CM

## 2021-11-03 DIAGNOSIS — D31.31: ICD-10-CM

## 2021-11-03 DIAGNOSIS — H35.033: ICD-10-CM

## 2021-11-03 DIAGNOSIS — H35.3223: ICD-10-CM

## 2021-11-03 DIAGNOSIS — H35.433: ICD-10-CM

## 2021-11-03 DIAGNOSIS — H43.813: ICD-10-CM

## 2021-11-03 PROCEDURE — 92202 OPSCPY EXTND ON/MAC DRAW: CPT

## 2021-11-03 PROCEDURE — 92012 INTRM OPH EXAM EST PATIENT: CPT

## 2021-11-03 ASSESSMENT — TONOMETRY
OS_IOP_MMHG: 14
OD_IOP_MMHG: 15

## 2021-11-03 ASSESSMENT — VISUAL ACUITY
OS_SC: 10/200
OD_SC: 1/200

## 2021-11-08 ENCOUNTER — APPOINTMENT (OUTPATIENT)
Dept: GERIATRICS | Facility: HOME HEALTH | Age: 86
End: 2021-11-08

## 2021-11-15 ENCOUNTER — APPOINTMENT (OUTPATIENT)
Dept: GERIATRICS | Facility: HOME HEALTH | Age: 86
End: 2021-11-15
Payer: MEDICARE

## 2021-11-15 DIAGNOSIS — K59.09 OTHER CONSTIPATION: ICD-10-CM

## 2021-11-15 DIAGNOSIS — R41.89 OTHER SYMPTOMS AND SIGNS INVOLVING COGNITIVE FUNCTIONS AND AWARENESS: ICD-10-CM

## 2021-11-15 PROCEDURE — 99214 OFFICE O/P EST MOD 30 MIN: CPT | Mod: 95,GC

## 2021-11-16 NOTE — REVIEW OF SYSTEMS
[Eyesight Problems] : eyesight problems [Negative] : Heme/Lymph [FreeTextEntry3] : decreased vision in right eye

## 2021-11-16 NOTE — HISTORY OF PRESENT ILLNESS
[Home] : at home, [unfilled] , at the time of the visit. [Medical Office: ([facility])___] : at the medical office located in  [Formal Caregiver] : formal caregiver [Verbal consent obtained from patient] : the patient, [unfilled] [] : Patient is incontinent. [Completely Dependent] : Completely dependent. [FreeTextEntry1] : Patient seen and examined at home.  Patient is homebound 2/2 generalized weakness/debility.  HHA Arin during visit.  All report patient at baseline.  No acute complaints.  No CP/SOB. No abdominal complaints with good appetite and regular BM's.  No urinary complaints.  No recent falls.  No skin wounds. She is decreased vision in right vision for which she has ophthalmologist outside, AS per HHA she is due for a cataract surgery

## 2021-11-19 ENCOUNTER — APPOINTMENT (OUTPATIENT)
Dept: CARDIOLOGY | Facility: CLINIC | Age: 86
End: 2021-11-19
Payer: MEDICARE

## 2021-11-19 ENCOUNTER — NON-APPOINTMENT (OUTPATIENT)
Age: 86
End: 2021-11-19

## 2021-11-19 PROCEDURE — 93298 REM INTERROG DEV EVAL SCRMS: CPT

## 2021-11-19 PROCEDURE — G2066: CPT

## 2021-12-01 ENCOUNTER — SURGERY/PROCEDURE (OUTPATIENT)
Dept: URBAN - METROPOLITAN AREA MEDICAL CENTER 2 | Facility: MEDICAL CENTER | Age: 86
End: 2021-12-01

## 2021-12-01 DIAGNOSIS — H43.11: ICD-10-CM

## 2021-12-01 PROCEDURE — 67036 REMOVAL OF INNER EYE FLUID: CPT

## 2021-12-02 ENCOUNTER — 1 DAY POST-OP (OUTPATIENT)
Dept: URBAN - METROPOLITAN AREA CLINIC 51 | Facility: CLINIC | Age: 86
End: 2021-12-02

## 2021-12-02 DIAGNOSIS — H35.3211: ICD-10-CM

## 2021-12-02 DIAGNOSIS — H43.11: ICD-10-CM

## 2021-12-02 DIAGNOSIS — H35.433: ICD-10-CM

## 2021-12-02 DIAGNOSIS — H35.033: ICD-10-CM

## 2021-12-02 DIAGNOSIS — D31.31: ICD-10-CM

## 2021-12-02 DIAGNOSIS — H35.3223: ICD-10-CM

## 2021-12-02 DIAGNOSIS — H43.813: ICD-10-CM

## 2021-12-02 PROCEDURE — 99024 POSTOP FOLLOW-UP VISIT: CPT

## 2021-12-02 PROCEDURE — 92202 OPSCPY EXTND ON/MAC DRAW: CPT

## 2021-12-02 ASSESSMENT — TONOMETRY
OD_IOP_MMHG: 12
OS_IOP_MMHG: 15

## 2021-12-02 ASSESSMENT — VISUAL ACUITY
OD_SC: 5/200
OS_SC: 10/200

## 2021-12-08 ENCOUNTER — POST-OP CHECK (OUTPATIENT)
Dept: URBAN - METROPOLITAN AREA CLINIC 109 | Facility: CLINIC | Age: 86
End: 2021-12-08

## 2021-12-08 DIAGNOSIS — H35.433: ICD-10-CM

## 2021-12-08 DIAGNOSIS — H35.3211: ICD-10-CM

## 2021-12-08 DIAGNOSIS — H43.11: ICD-10-CM

## 2021-12-08 DIAGNOSIS — H35.033: ICD-10-CM

## 2021-12-08 DIAGNOSIS — H35.3223: ICD-10-CM

## 2021-12-08 DIAGNOSIS — H43.813: ICD-10-CM

## 2021-12-08 PROCEDURE — 92202 OPSCPY EXTND ON/MAC DRAW: CPT

## 2021-12-08 PROCEDURE — 92134 CPTRZ OPH DX IMG PST SGM RTA: CPT

## 2021-12-08 PROCEDURE — PFS EYLEA PFS

## 2021-12-08 PROCEDURE — 99024 POSTOP FOLLOW-UP VISIT: CPT | Mod: 25

## 2021-12-08 PROCEDURE — 92250 FUNDUS PHOTOGRAPHY W/I&R: CPT

## 2021-12-08 PROCEDURE — 67028 INJECTION EYE DRUG: CPT

## 2021-12-08 ASSESSMENT — TONOMETRY
OD_IOP_MMHG: 13
OS_IOP_MMHG: 18

## 2021-12-08 ASSESSMENT — VISUAL ACUITY
OD_SC: 20/125
OD_PH: 20/125+1

## 2022-01-03 ENCOUNTER — APPOINTMENT (OUTPATIENT)
Dept: GERIATRICS | Facility: HOME HEALTH | Age: 87
End: 2022-01-03
Payer: MEDICARE

## 2022-01-03 VITALS
DIASTOLIC BLOOD PRESSURE: 62 MMHG | TEMPERATURE: 97.2 F | HEART RATE: 66 BPM | RESPIRATION RATE: 18 BRPM | OXYGEN SATURATION: 98 % | SYSTOLIC BLOOD PRESSURE: 118 MMHG

## 2022-01-03 DIAGNOSIS — R26.9 UNSPECIFIED ABNORMALITIES OF GAIT AND MOBILITY: ICD-10-CM

## 2022-01-03 DIAGNOSIS — Z23 ENCOUNTER FOR IMMUNIZATION: ICD-10-CM

## 2022-01-03 PROCEDURE — 99348 HOME/RES VST EST LOW MDM 30: CPT

## 2022-01-03 RX ORDER — LACTULOSE 10 G/15ML
10 SOLUTION ORAL DAILY
Qty: 1 | Refills: 3 | Status: DISCONTINUED | COMMUNITY
Start: 2021-10-03 | End: 2022-01-03

## 2022-01-03 NOTE — PHYSICAL EXAM
[Alert] : alert [No Acute Distress] : in no acute distress [PERRL] : pupils were equal in size, round, and reactive to light [No Strabismus] : no strabismus was seen [No Oral Pallor] : no oral pallor [No Oral Cyanosis] : no oral cyanosis [Normal Outer Ear/Nose] : the ears and nose were normal in appearance [Normal Appearance] : the appearance of the neck was normal [Supple] : the neck was supple [No Respiratory Distress] : no respiratory distress [No Acc Muscle Use] : no accessory muscle use [Respiration, Rhythm And Depth] : normal respiratory rhythm and effort [Auscultation Breath Sounds / Voice Sounds] : lungs were clear to auscultation bilaterally [Heart Rate And Rhythm] : heart rate was normal and rhythm regular [Edema] : edema was not present [Pedal Pulses Normal] : the pedal pulses are present [Bowel Sounds] : normal bowel sounds [Abdomen Tenderness] : non-tender [Abdomen Soft] : soft [No Spinal Tenderness] : no spinal tenderness [Normal Color / Pigmentation] : normal skin color and pigmentation [Normal Turgor] : normal skin turgor [No Focal Deficits] : no focal deficits [Normal Affect] : the affect was normal [Normal Mood] : the mood was normal

## 2022-01-05 NOTE — HISTORY OF PRESENT ILLNESS
[FreeTextEntry1] : Pt is a 89 yr old female seen for a flu visit, caregiver present during the visit. pt  is doing well  presents no new complaints, spoke with nephew and requested to have PT. pt denies CP/discomfort, on senna and lactulose for constipation, urinating well,  Appetite/hydration good. use walker with assistance with caregiver. Flu vaccine given today.

## 2022-01-05 NOTE — ASSESSMENT
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

## 2022-02-18 ENCOUNTER — NON-APPOINTMENT (OUTPATIENT)
Age: 87
End: 2022-02-18

## 2022-02-18 ENCOUNTER — APPOINTMENT (OUTPATIENT)
Dept: CARDIOLOGY | Facility: CLINIC | Age: 87
End: 2022-02-18
Payer: MEDICARE

## 2022-02-18 PROCEDURE — 93295 DEV INTERROG REMOTE 1/2/MLT: CPT

## 2022-02-18 PROCEDURE — 93296 REM INTERROG EVL PM/IDS: CPT

## 2022-03-21 ENCOUNTER — RX RENEWAL (OUTPATIENT)
Age: 87
End: 2022-03-21

## 2022-04-06 ENCOUNTER — APPOINTMENT (OUTPATIENT)
Dept: CARDIOLOGY | Facility: CLINIC | Age: 87
End: 2022-04-06
Payer: MEDICARE

## 2022-04-06 VITALS
SYSTOLIC BLOOD PRESSURE: 125 MMHG | HEIGHT: 63 IN | BODY MASS INDEX: 21.97 KG/M2 | TEMPERATURE: 97.3 F | HEART RATE: 60 BPM | WEIGHT: 124 LBS | DIASTOLIC BLOOD PRESSURE: 73 MMHG

## 2022-04-06 PROCEDURE — 93283 PRGRMG EVAL IMPLANTABLE DFB: CPT

## 2022-04-06 PROCEDURE — 93000 ELECTROCARDIOGRAM COMPLETE: CPT | Mod: 59

## 2022-04-06 RX ORDER — GABAPENTIN 100 MG/1
100 CAPSULE ORAL 3 TIMES DAILY
Refills: 0 | Status: COMPLETED | COMMUNITY
End: 2022-04-06

## 2022-07-07 ENCOUNTER — NON-APPOINTMENT (OUTPATIENT)
Age: 87
End: 2022-07-07

## 2022-07-07 ENCOUNTER — APPOINTMENT (OUTPATIENT)
Dept: CARDIOLOGY | Facility: CLINIC | Age: 87
End: 2022-07-07

## 2022-07-07 PROCEDURE — 93295 DEV INTERROG REMOTE 1/2/MLT: CPT

## 2022-07-07 PROCEDURE — 93296 REM INTERROG EVL PM/IDS: CPT

## 2022-09-21 ENCOUNTER — RX RENEWAL (OUTPATIENT)
Age: 87
End: 2022-09-21

## 2022-10-06 ENCOUNTER — APPOINTMENT (OUTPATIENT)
Dept: CARDIOLOGY | Facility: CLINIC | Age: 87
End: 2022-10-06

## 2022-10-06 ENCOUNTER — NON-APPOINTMENT (OUTPATIENT)
Age: 87
End: 2022-10-06

## 2022-10-06 PROCEDURE — 93295 DEV INTERROG REMOTE 1/2/MLT: CPT

## 2022-10-06 PROCEDURE — 93296 REM INTERROG EVL PM/IDS: CPT

## 2023-01-05 ENCOUNTER — APPOINTMENT (OUTPATIENT)
Dept: CARDIOLOGY | Facility: CLINIC | Age: 88
End: 2023-01-05
Payer: MEDICARE

## 2023-01-05 ENCOUNTER — NON-APPOINTMENT (OUTPATIENT)
Age: 88
End: 2023-01-05

## 2023-01-05 PROCEDURE — 93295 DEV INTERROG REMOTE 1/2/MLT: CPT

## 2023-01-05 PROCEDURE — 93296 REM INTERROG EVL PM/IDS: CPT

## 2023-01-26 ENCOUNTER — RX RENEWAL (OUTPATIENT)
Age: 88
End: 2023-01-26

## 2023-04-05 ENCOUNTER — APPOINTMENT (OUTPATIENT)
Dept: ELECTROPHYSIOLOGY | Facility: CLINIC | Age: 88
End: 2023-04-05
Payer: MEDICARE

## 2023-04-05 VITALS
RESPIRATION RATE: 16 BRPM | HEIGHT: 63 IN | BODY MASS INDEX: 21.26 KG/M2 | DIASTOLIC BLOOD PRESSURE: 76 MMHG | TEMPERATURE: 97.1 F | WEIGHT: 120 LBS | SYSTOLIC BLOOD PRESSURE: 122 MMHG | HEART RATE: 59 BPM

## 2023-04-05 PROCEDURE — 93000 ELECTROCARDIOGRAM COMPLETE: CPT | Mod: 59

## 2023-04-05 PROCEDURE — 93290 INTERROG DEV EVAL ICPMS IP: CPT | Mod: 26

## 2023-04-05 PROCEDURE — 93284 PRGRMG EVAL IMPLANTABLE DFB: CPT

## 2023-04-12 NOTE — CARDIOLOGY SUMMARY
[de-identified] : 4/5/2023 - 59 atrial flutter ,V paced \par  4/6/2022) 60 bpm  paced  RBB 178ms [de-identified] : 7/26/2017 - MEDT CRT-D

## 2023-04-12 NOTE — PROCEDURE
[Complete Heart Block] : complete heart block [See Device Printout] : See device printout [CRT-D] : Cardiac resynchronization therapy defibrillator [VVIR] : VVIR [Longevity: ___ months] : The estimated remaining battery life is [unfilled] months [Threshold Testing Performed] : Threshold testing was performed [Sensing Amplitude ___mv] : sensing amplitude was [unfilled] mv [___V @] : [unfilled] V [___ ms] : [unfilled] ms [Programmed for Longevity] : output reprogrammed for improved battery longevity [Pace ___ %] : Pace [unfilled]% [Lead Imp:  ___ohms] : lead impedance was [unfilled] ohms [de-identified] : AFib [de-identified] : Medtronic [de-identified] : [id number] [de-identified] : [id number] [de-identified] : 7/26/2017 [de-identified] : 60 [de-identified] : NO EVENTS.\par total  99.9\par optivol shows no fluid congestion

## 2023-04-12 NOTE — PROCEDURE
[Complete Heart Block] : complete heart block [See Device Printout] : See device printout [CRT-D] : Cardiac resynchronization therapy defibrillator [VVIR] : VVIR [Longevity: ___ months] : The estimated remaining battery life is [unfilled] months [Threshold Testing Performed] : Threshold testing was performed [Sensing Amplitude ___mv] : sensing amplitude was [unfilled] mv [___V @] : [unfilled] V [___ ms] : [unfilled] ms [Programmed for Longevity] : output reprogrammed for improved battery longevity [Pace ___ %] : Pace [unfilled]% [Lead Imp:  ___ohms] : lead impedance was [unfilled] ohms [de-identified] : AFib [de-identified] : Medtronic [de-identified] : [id number] [de-identified] : [id number] [de-identified] : 7/26/2017 [de-identified] : 60 [de-identified] : NO EVENTS.\par total  99.9\par optivol shows no fluid congestion

## 2023-04-12 NOTE — CARDIOLOGY SUMMARY
[de-identified] : \par  4/6/2022) 60 bpm  paced  RBB 178ms [de-identified] : 7/26/2017 - MEDT CRT-D

## 2023-04-12 NOTE — ASSESSMENT
[FreeTextEntry1] : \par # Chronic AFib \par -  Revisited initiation of anticoagulation but nephew and pt do not wish to start OAC .Both understands the risks and benefits\par - Cont Aspirin\par \par # CHB s/p BiV ICD\par -  Capped RA lead. Normal functioning BiV ICD. \par - Remote monitor is set up and patient is transmitting. Optivol - no congestion \par - remaining battery life 7 months\par \par # HTN- controlled\par - 2g Na diet enforced\par - Follow up with cardiologist\par \par I have also advised the patient to go to the nearest emergency room if she experiences any chest pain, dyspnea, syncope, or has any other compelling symptoms.\par \par Follow up in 5 mo for in office interrogation with NP.

## 2023-04-12 NOTE — CARDIOLOGY SUMMARY
[de-identified] : 4/5/2023 - 59 atrial flutter ,V paced \par  4/6/2022) 60 bpm  paced  RBB 178ms [de-identified] : 7/26/2017 - MEDT CRT-D

## 2023-04-12 NOTE — HISTORY OF PRESENT ILLNESS
[de-identified] : \par Cardiologist: Dr. Aguilar\par \par 89 yo F with history of CHB s/p DC PPM (4/30/2010) s/p BiV ICD upgrade (9/19/2012 by Dr. Cordova with capped RA and RV pacing lead), s/p BiV ICD gen change (2017) chronic AFib (not on AC due to history of recurrent falls), here for routine BiV ICD interrogation. \par

## 2023-04-12 NOTE — HISTORY OF PRESENT ILLNESS
[de-identified] : \par Cardiologist: Dr. Aguilar\par \par 89 yo F with history of CHB s/p DC PPM (4/30/2010) s/p BiV ICD upgrade (9/19/2012 by Dr. Cordova with capped RA and RV pacing lead), s/p BiV ICD gen change (2017) chronic AFib (not on AC due to history of recurrent falls), here for routine BiV ICD interrogation. \par

## 2023-04-12 NOTE — HISTORY OF PRESENT ILLNESS
[de-identified] : \par Cardiologist: Dr. Aguilar\par \par 89 yo F with history of CHB s/p DC PPM (4/30/2010) s/p BiV ICD upgrade (9/19/2012 by Dr. Cordova with capped RA and RV pacing lead), s/p BiV ICD gen change (2017) chronic AFib (not on AC due to history of recurrent falls), here for routine BiV ICD interrogation. \par

## 2023-04-12 NOTE — PHYSICAL EXAM
[Normal Appearance] : normal appearance [Well Groomed] : well groomed [No Deformities] : no deformities [General Appearance - In No Acute Distress] : no acute distress [Heart Rate And Rhythm] : heart rate and rhythm were normal [Heart Sounds] : normal S1 and S2 [Edema] : no peripheral edema present [] : no respiratory distress [Respiration, Rhythm And Depth] : normal respiratory rhythm and effort [Exaggerated Use Of Accessory Muscles For Inspiration] : no accessory muscle use [Auscultation Breath Sounds / Voice Sounds] : lungs were clear to auscultation bilaterally [Left Infraclavicular] : left infraclavicular area [Well-Healed] : well-healed [Abdomen Soft] : soft [Nail Clubbing] : no clubbing of the fingernails [FreeTextEntry1] : sitting in wheelchair [Erythema] : not erythematous

## 2023-04-12 NOTE — PROCEDURE
[Complete Heart Block] : complete heart block [See Device Printout] : See device printout [CRT-D] : Cardiac resynchronization therapy defibrillator [VVIR] : VVIR [Threshold Testing Performed] : Threshold testing was performed [___V @] : [unfilled] V [___ ms] : [unfilled] ms [Programmed for Longevity] : output reprogrammed for improved battery longevity [Pace ___ %] : Pace [unfilled]% [Sensing Amplitude ___mv] : sensing amplitude was [unfilled] mv [Longevity: ___ months] : The estimated remaining battery life is [unfilled] months [Lead Imp:  ___ohms] : lead impedance was [unfilled] ohms [de-identified] : AFib [de-identified] : Medtronic [de-identified] : [id number] [de-identified] : [id number] [de-identified] : 7/26/2017 [de-identified] : 60 [de-identified] : NO EVENTS.\par total  99.9\par optivol shows no fluid congestion

## 2023-04-12 NOTE — ASSESSMENT
[FreeTextEntry1] : \par # Chronic AFib \par -  Revisited initiation of anticoagulation but nephew and pt do not wish to start OAC .Both understands the risks and benefits\par - Cont Aspirin\par \par # CHB s/p BiV ICD\par -  Capped RA lead. Normal functioning BiV ICD. \par - Remote monitor is set up and patient is transmitting. Optivol - no congestion \par \par \par # HTN- controlled\par - 2g Na diet enforced\par - Follow up with cardiologist\par \par I have also advised the patient to go to the nearest emergency room if she experiences any chest pain, dyspnea, syncope, or has any other compelling symptoms.\par \par Follow up in 9 mo for in office interrogation with NP.

## 2023-07-05 ENCOUNTER — NON-APPOINTMENT (OUTPATIENT)
Age: 88
End: 2023-07-05

## 2023-07-05 ENCOUNTER — APPOINTMENT (OUTPATIENT)
Dept: CARDIOLOGY | Facility: CLINIC | Age: 88
End: 2023-07-05
Payer: MEDICARE

## 2023-07-05 PROCEDURE — 93295 DEV INTERROG REMOTE 1/2/MLT: CPT

## 2023-07-05 PROCEDURE — 93296 REM INTERROG EVL PM/IDS: CPT

## 2023-09-06 ENCOUNTER — APPOINTMENT (OUTPATIENT)
Dept: ELECTROPHYSIOLOGY | Facility: CLINIC | Age: 88
End: 2023-09-06
Payer: MEDICARE

## 2023-09-06 ENCOUNTER — LABORATORY RESULT (OUTPATIENT)
Age: 88
End: 2023-09-06

## 2023-09-06 VITALS
WEIGHT: 132 LBS | HEART RATE: 65 BPM | DIASTOLIC BLOOD PRESSURE: 67 MMHG | BODY MASS INDEX: 24.29 KG/M2 | TEMPERATURE: 98 F | SYSTOLIC BLOOD PRESSURE: 110 MMHG | HEIGHT: 62 IN

## 2023-09-06 DIAGNOSIS — Z45.02 ENCOUNTER FOR ADJUSTMENT AND MANAGEMENT OF AUTOMATIC IMPLANTABLE CARDIAC DEFIBRILLATOR: ICD-10-CM

## 2023-09-06 DIAGNOSIS — Z01.818 ENCOUNTER FOR OTHER PREPROCEDURAL EXAMINATION: ICD-10-CM

## 2023-09-06 PROCEDURE — 93290 INTERROG DEV EVAL ICPMS IP: CPT | Mod: 26

## 2023-09-06 PROCEDURE — 99215 OFFICE O/P EST HI 40 MIN: CPT

## 2023-09-06 PROCEDURE — 93284 PRGRMG EVAL IMPLANTABLE DFB: CPT

## 2023-09-06 PROCEDURE — 93000 ELECTROCARDIOGRAM COMPLETE: CPT | Mod: 59

## 2023-09-06 RX ORDER — SENNOSIDES 8.6 MG TABLETS 8.6 MG/1
8.6 TABLET ORAL
Qty: 60 | Refills: 2 | Status: ACTIVE | COMMUNITY
Start: 2021-11-15

## 2023-09-06 RX ORDER — LACTULOSE 10 G/15ML
10 SOLUTION ORAL
Qty: 473 | Refills: 2 | Status: ACTIVE | COMMUNITY
Start: 2021-10-07

## 2023-09-06 RX ORDER — VITS A,C,E/LUTEIN/MINERALS 300MCG-200
TABLET ORAL DAILY
Refills: 0 | Status: ACTIVE | COMMUNITY

## 2023-09-06 RX ORDER — FUROSEMIDE 20 MG/1
20 TABLET ORAL DAILY
Qty: 90 | Refills: 2 | Status: ACTIVE | COMMUNITY
Start: 2022-03-21

## 2023-09-06 RX ORDER — ASPIRIN 81 MG
81 TABLET, DELAYED RELEASE (ENTERIC COATED) ORAL DAILY
Refills: 0 | Status: ACTIVE | COMMUNITY

## 2023-09-06 NOTE — PROCEDURE
[Complete Heart Block] : complete heart block [de-identified] : AFib [de-identified] : Medtronic [de-identified] : [id number] [de-identified] : [id number] [de-identified] : 7/26/2017 [de-identified] : 60 [de-identified] : NO EVENTS. total  99.9% optivol shows no fluid congestion

## 2023-09-06 NOTE — DISCUSSION/SUMMARY
[AICD Function Normal] : normal AICD function [Pulse Generator Replacement] : replace pulse generator [FreeTextEntry1] : Will plan device generator change  We discussed the risks/benefits/alternatives, nature of procedure, and follow up care after device is implanted. We also discussed remote monitoring in details. We discussed the risks including but not limited to bleeding, hematoma, injury to vessels, lead injury, device malfunction, and rare risks of stroke/heart attack/death. Patient expressed understanding of the discussion. I answered all questions in detail and patient was agreeable to generator change.

## 2023-09-06 NOTE — END OF VISIT
[FreeTextEntry3] : I was present with the nurse practitioner during the history and exam of the patient. I discussed patient's management with the NP in detail. I reviewed the NPs note and agree with the documented findings and plan of care. I would like to take this opportunity to thank you for involving me in this patients care. Please do not hesitate to contact me if you have any further questions at [phone].

## 2023-09-06 NOTE — CARDIOLOGY SUMMARY
[de-identified] : 9/6/2023 AF with cont VR (HR 60 bpm), V pacing 4/5/2023 - 59 atrial flutter, V paced   4/6/2022) 60 bpm  paced RBB 178ms [de-identified] : 7/26/2017 - MEDT CRT-D

## 2023-09-06 NOTE — PHYSICAL EXAM
[General Appearance - Well Developed] : well developed [FreeTextEntry1] : sitting in wheelchair [Erythema] : not erythematous

## 2023-09-06 NOTE — ASSESSMENT
[FreeTextEntry1] : 89 years old with CRTD in VVIR mode.   # Chronic AFib  - Not on OAC due to high fall risk  - Cont Aspirin  # CHB s/p BiV ICD reached RRT 9/3/23 and will be scheduled for a gen change - Capped RA lead.   -Optivol no congestion  - Sept 8 scheduled for Dr. Cisneros Patient sent for BW, including UA EKG done for pre- op. Yamilka Lai [phone] (her niece) will be called by the Lab staff on Thursday to give instruction when to come to the hospital.  I have also advised the patient to go to the nearest emergency room if she experiences any chest pain, dyspnea, syncope, or has any other compelling symptoms.   Follow up with NP for wound check post implant. The patient is a 70y Female complaining of fall.

## 2023-09-06 NOTE — HISTORY OF PRESENT ILLNESS
[de-identified] :  Cardiologist: Dr. Aguilar  89 yo F with history of CHB s/p DC PPM (4/30/2010) s/p BiV ICD upgrade (9/19/2012 by Dr. Cordova with capped RA and RV pacing lead), s/p BiV ICD gen change (2017) chronic AFib (not on AC due to history of recurrent falls), here for routine BiV ICD interrogation. Her device has now reached RRT.

## 2023-09-07 LAB — APTT BLD: 32.1 SEC

## 2023-09-08 ENCOUNTER — OUTPATIENT (OUTPATIENT)
Dept: OUTPATIENT SERVICES | Facility: HOSPITAL | Age: 88
LOS: 1 days | Discharge: HOME | End: 2023-09-08
Payer: MEDICARE

## 2023-09-08 ENCOUNTER — APPOINTMENT (OUTPATIENT)
Dept: ELECTROPHYSIOLOGY | Facility: HOSPITAL | Age: 88
End: 2023-09-08

## 2023-09-08 VITALS — WEIGHT: 132.28 LBS

## 2023-09-08 DIAGNOSIS — Z96.649 PRESENCE OF UNSPECIFIED ARTIFICIAL HIP JOINT: Chronic | ICD-10-CM

## 2023-09-08 DIAGNOSIS — Z95.810 PRESENCE OF AUTOMATIC (IMPLANTABLE) CARDIAC DEFIBRILLATOR: ICD-10-CM

## 2023-09-08 DIAGNOSIS — Z96.659 PRESENCE OF UNSPECIFIED ARTIFICIAL KNEE JOINT: Chronic | ICD-10-CM

## 2023-09-08 DIAGNOSIS — I49.5 SICK SINUS SYNDROME: ICD-10-CM

## 2023-09-08 DIAGNOSIS — T82.111A BREAKDOWN (MECHANICAL) OF CARDIAC PULSE GENERATOR (BATTERY), INITIAL ENCOUNTER: ICD-10-CM

## 2023-09-08 DIAGNOSIS — Z98.49 CATARACT EXTRACTION STATUS, UNSPECIFIED EYE: Chronic | ICD-10-CM

## 2023-09-08 LAB
ALBUMIN SERPL ELPH-MCNC: 4.7 G/DL
ALP BLD-CCNC: 67 U/L
ALT SERPL-CCNC: 11 U/L
ANION GAP SERPL CALC-SCNC: 14 MMOL/L
APPEARANCE: ABNORMAL
AST SERPL-CCNC: 17 U/L
BILIRUB SERPL-MCNC: 0.4 MG/DL
BILIRUBIN URINE: NEGATIVE
BLOOD URINE: NEGATIVE
BUN SERPL-MCNC: 20 MG/DL
CALCIUM SERPL-MCNC: 11.6 MG/DL
CHLORIDE SERPL-SCNC: 98 MMOL/L
CO2 SERPL-SCNC: 26 MMOL/L
COLOR: NORMAL
CREAT SERPL-MCNC: 0.9 MG/DL
EGFR: 60 ML/MIN/1.73M2
GLUCOSE QUALITATIVE U: NEGATIVE
GLUCOSE SERPL-MCNC: 82 MG/DL
HCT VFR BLD CALC: 42.4 %
HGB BLD-MCNC: 13.6 G/DL
INR PPP: 0.91 RATIO
KETONES URINE: NEGATIVE
LEUKOCYTE ESTERASE URINE: ABNORMAL
MCHC RBC-ENTMCNC: 30.2 PG
MCHC RBC-ENTMCNC: 32.1 G/DL
MCV RBC AUTO: 94 FL
NITRITE URINE: NEGATIVE
PH URINE: 6.5
PLATELET # BLD AUTO: 249 K/UL
PMV BLD: 11.5 FL
POTASSIUM SERPL-SCNC: 4.5 MMOL/L
PROT SERPL-MCNC: 7.6 G/DL
PROTEIN URINE: NORMAL
PT BLD: 10.3 SEC
RBC # BLD: 4.51 M/UL
RBC # FLD: 14.3 %
SODIUM SERPL-SCNC: 138 MMOL/L
SPECIFIC GRAVITY URINE: 1.02
UROBILINOGEN URINE: NORMAL
WBC # FLD AUTO: 8.72 K/UL

## 2023-09-08 PROCEDURE — C1882: CPT

## 2023-09-08 PROCEDURE — C1889: CPT

## 2023-09-08 PROCEDURE — 36415 COLL VENOUS BLD VENIPUNCTURE: CPT

## 2023-09-08 PROCEDURE — 33264 RMVL & RPLCMT DFB GEN MLT LD: CPT

## 2023-09-08 RX ORDER — GABAPENTIN 400 MG/1
1 CAPSULE ORAL
Qty: 0 | Refills: 0 | DISCHARGE

## 2023-09-08 RX ORDER — AMLODIPINE BESYLATE 2.5 MG/1
2.5 TABLET ORAL ONCE
Refills: 0 | Status: COMPLETED | OUTPATIENT
Start: 2023-09-08 | End: 2023-09-08

## 2023-09-08 RX ORDER — LOSARTAN POTASSIUM 100 MG/1
1 TABLET, FILM COATED ORAL
Refills: 0 | DISCHARGE

## 2023-09-08 RX ORDER — VANCOMYCIN HCL 1 G
1000 VIAL (EA) INTRAVENOUS ONCE
Refills: 0 | Status: DISCONTINUED | OUTPATIENT
Start: 2023-09-08 | End: 2023-09-08

## 2023-09-08 RX ORDER — ASPIRIN/CALCIUM CARB/MAGNESIUM 324 MG
1 TABLET ORAL
Qty: 0 | Refills: 0 | DISCHARGE

## 2023-09-08 RX ORDER — SIMVASTATIN 20 MG/1
1 TABLET, FILM COATED ORAL
Qty: 0 | Refills: 0 | DISCHARGE

## 2023-09-08 RX ORDER — LINACLOTIDE 145 UG/1
1 CAPSULE, GELATIN COATED ORAL
Refills: 0 | DISCHARGE

## 2023-09-08 RX ORDER — FUROSEMIDE 40 MG
1 TABLET ORAL
Refills: 0 | DISCHARGE

## 2023-09-08 RX ORDER — ESOMEPRAZOLE MAGNESIUM 40 MG/1
0 CAPSULE, DELAYED RELEASE ORAL
Qty: 0 | Refills: 0 | DISCHARGE

## 2023-09-08 RX ORDER — FUROSEMIDE 40 MG
20 TABLET ORAL ONCE
Refills: 0 | Status: COMPLETED | OUTPATIENT
Start: 2023-09-08 | End: 2023-09-08

## 2023-09-08 RX ORDER — AMLODIPINE BESYLATE 2.5 MG/1
1 TABLET ORAL
Refills: 0 | DISCHARGE

## 2023-09-08 RX ORDER — CARVEDILOL PHOSPHATE 80 MG/1
1 CAPSULE, EXTENDED RELEASE ORAL
Qty: 0 | Refills: 0 | DISCHARGE

## 2023-09-08 RX ADMIN — AMLODIPINE BESYLATE 2.5 MILLIGRAM(S): 2.5 TABLET ORAL at 13:51

## 2023-09-08 RX ADMIN — Medication 20 MILLIGRAM(S): at 13:51

## 2023-09-08 NOTE — ASU PATIENT PROFILE, ADULT - NSICDXPASTMEDICALHX_GEN_ALL_CORE_FT
PAST MEDICAL HISTORY:  AICD (automatic cardioverter/defibrillator) present     High cholesterol     HTN (hypertension)     Pacemaker

## 2023-09-08 NOTE — H&P ADULT - ASSESSMENT
EP: [facility]  [facility]" Duvvuri    This is a 89 yo female with PMH HTN, HLD, CHB s/p DC PPM (2010), upgrade CRT-D (9/19/13 with Dr. Cordova with capped RA and RV lead,  chronic AF (no AC d/t recurrent falls), who presents for generator change    Plan:  - Keep NPO  - Vanco for periop ppx  - Home following procedure   - Cont home meds at discharge  - Start Bactrim DS Q 12 x 5 days starting tonight for postop ppx  - Outpatient fu in 1 month for wound check

## 2023-09-08 NOTE — H&P ADULT - NSHPPHYSICALEXAM_GEN_ALL_CORE
General: NAD, well appearing  Pulm: CTA bilaterally. No rales, wheezing or rhonchi  Cardiac: S1S2, irregular. No rubs, murmurs, or gallops  GI: Soft, non-tender, non-distended. + BS  PV: Warm and well perfused. No clubbing cyanosis or edema  Neuro: AO x4, ANDERSON, speech clear  Psych: Normal mood and affect

## 2023-09-08 NOTE — ASU PATIENT PROFILE, ADULT - NSICDXPASTSURGICALHX_GEN_ALL_CORE_FT
PAST SURGICAL HISTORY:  S/P cataract surgery     S/P hip replacement b/l    S/P knee replacement

## 2023-09-08 NOTE — ASU PATIENT PROFILE, ADULT - FALL HARM RISK - HARM RISK INTERVENTIONS

## 2023-09-08 NOTE — H&P ADULT - HISTORY OF PRESENT ILLNESS
This is a 89 yo female with PMH HTN, HLD, CHB s/p DC PPM (2010), upgrade CRT-D (9/19/13 with Dr. Cordova with capped RA and RV lead,  chronic AF (no AC d/t recurrent falls), who presents for generator change. Pt currently denies fever, chills, dizziness, chest pain. SOB, palpitations, abd pain, n/v/d/c, dysuria or hematuria.

## 2023-09-08 NOTE — PROGRESS NOTE ADULT - SUBJECTIVE AND OBJECTIVE BOX
Electrophysiology Brief Post-Op Note    I have personally seen and examined the patient.  I agree with the history and physical which I have reviewed and noted any changes below.  09-08-23 @ 10 AM    PRE-OP DIAGNOSIS: ICD at [facility]    POST-OP DIAGNOSIS:  ICD at [facility], [facility]    PROCEDURE: Defibrillator Generator Change    Physician: Briana Cisneros MD  Assistant: None    ESTIMATED BLOOD LOSS:      10 mL    ANESTHESIA TYPE:  [  ] General Anesthesia  [ x ] Sedation  [ x ] Local/Regional    CONDITION  [  ] Critical  [  ] Serious  [  ] Fair  [ x ] Good      SPECIMENS REMOVED (IF APPLICABLE): Old Defibrillator (MDT)    IMPLANTS (IF APPLICABLE): BiV ICD (MDT)    FINDINGS: Defibrillator at [facility], [facility]    COMPLICATIONS: None    PLAN OF CARE  Bactrim DS x 5 days  No shower x 3 days.  No swimming or hot tubs x 1 month.  Follow up with me in the office in 3-4 weeks at [address] [place] [address], [address] (Phone [phone])      Briana Cisneros MD   Electrophysiology Attending

## 2023-10-06 ENCOUNTER — APPOINTMENT (OUTPATIENT)
Dept: ELECTROPHYSIOLOGY | Facility: CLINIC | Age: 88
End: 2023-10-06
Payer: MEDICARE

## 2023-10-06 VITALS
WEIGHT: 145 LBS | TEMPERATURE: 98 F | HEIGHT: 62 IN | DIASTOLIC BLOOD PRESSURE: 80 MMHG | SYSTOLIC BLOOD PRESSURE: 110 MMHG | BODY MASS INDEX: 26.68 KG/M2 | HEART RATE: 60 BPM

## 2023-10-06 DIAGNOSIS — Z48.89 ENCOUNTER FOR OTHER SPECIFIED SURGICAL AFTERCARE: ICD-10-CM

## 2023-10-06 PROCEDURE — 93284 PRGRMG EVAL IMPLANTABLE DFB: CPT

## 2023-10-06 PROCEDURE — 93290 INTERROG DEV EVAL ICPMS IP: CPT

## 2023-10-06 PROCEDURE — 99024 POSTOP FOLLOW-UP VISIT: CPT

## 2023-10-06 PROCEDURE — 93000 ELECTROCARDIOGRAM COMPLETE: CPT | Mod: 59

## 2023-10-16 PROBLEM — Z48.89 ENCOUNTER FOR POSTOPERATIVE WOUND CHECK: Status: ACTIVE | Noted: 2023-10-16

## 2024-01-09 ENCOUNTER — APPOINTMENT (OUTPATIENT)
Dept: CARDIOLOGY | Facility: CLINIC | Age: 89
End: 2024-01-09
Payer: MEDICARE

## 2024-01-09 ENCOUNTER — NON-APPOINTMENT (OUTPATIENT)
Age: 89
End: 2024-01-09

## 2024-01-09 PROCEDURE — 93295 DEV INTERROG REMOTE 1/2/MLT: CPT

## 2024-01-09 PROCEDURE — 93296 REM INTERROG EVL PM/IDS: CPT

## 2024-02-27 NOTE — ASSESSMENT
[FreeTextEntry1] : Decreased vision in right eye due to vitreous hemorrhage\par - following ophthalmogist in [place], request clearance for vitrectomy under local anesthesia\par Aspirin recommended by cardiology, increased risk of further bleeding during surgery, would recommend either conservative treatment approach or obtaining cardiology evaluation prior attempting the surgery\par Cardiology follow up was recommended after last EPS/device eval, no record of cardiology follow up available for review. \par Given virous hemorrhage and the risk of loss of vision, patient may opt out from Aspirin treatment increasing her risk for stroke.  \par Need opthalmology notes. \par \par CHB s/p PPM/ICD placement, possible systolic CHF\par - EPS FU as scheduled\par no notes availble from cardiology Dr Pritchard\par \par AFib not on AC 2/2 recurrent Falls\par - EPS FU 6/14, no changes\par - Continue Coreg for now \par - Not on AC for recurrent Falls, c/w ASA\par \par HTN\par - Continue Coreg 25 mg BID \par - off furosemide, as per patient she is on lasix 20mg daily, refills sent\par - DASH Diet\par \par Cognitive Decline, likely AD\par - no behavioral disturbance but with confusion related by family\par - followed with Neuro, no med changes.  Will not follow regularly \par - c/w PT/OT\par 
The patient is a 7y3m Male complaining of chest pain.

## 2024-04-17 ENCOUNTER — APPOINTMENT (OUTPATIENT)
Dept: ELECTROPHYSIOLOGY | Facility: CLINIC | Age: 89
End: 2024-04-17
Payer: MEDICARE

## 2024-04-17 VITALS
WEIGHT: 140 LBS | BODY MASS INDEX: 25.76 KG/M2 | HEIGHT: 62 IN | SYSTOLIC BLOOD PRESSURE: 124 MMHG | HEART RATE: 60 BPM | TEMPERATURE: 97.1 F | RESPIRATION RATE: 18 BRPM | DIASTOLIC BLOOD PRESSURE: 68 MMHG

## 2024-04-17 DIAGNOSIS — I48.20 CHRONIC ATRIAL FIBRILLATION, UNSP: ICD-10-CM

## 2024-04-17 DIAGNOSIS — I44.2 ATRIOVENTRICULAR BLOCK, COMPLETE: ICD-10-CM

## 2024-04-17 DIAGNOSIS — Z45.02 ENCOUNTER FOR ADJUSTMENT AND MANAGEMENT OF AUTOMATIC IMPLANTABLE CARDIAC DEFIBRILLATOR: ICD-10-CM

## 2024-04-17 PROCEDURE — 93281 PM DEVICE PROGR EVAL MULTI: CPT

## 2024-04-17 PROCEDURE — 99213 OFFICE O/P EST LOW 20 MIN: CPT

## 2024-04-17 PROCEDURE — 93290 INTERROG DEV EVAL ICPMS IP: CPT

## 2024-04-17 RX ORDER — CARVEDILOL 25 MG/1
25 TABLET, FILM COATED ORAL TWICE DAILY
Qty: 180 | Refills: 1 | Status: ACTIVE | COMMUNITY
Start: 2022-03-21

## 2024-04-17 NOTE — CARDIOLOGY SUMMARY
[de-identified] : 10/6/2023- 60 bpm atrial fib , V paced 9/6/2023 AF with cont VR (HR 60 bpm), V pacing 4/5/2023 - 59 atrial flutter, V paced   4/6/2022) 60 bpm  paced RBB 178ms [de-identified] : 7/26/2017 - MEDT CRT-D 9/8/2023 - MEDTRONIC CRT D changed

## 2024-04-17 NOTE — PHYSICAL EXAM
[General Appearance - Well Developed] : well developed [Normal Appearance] : normal appearance [Well Groomed] : well groomed [No Deformities] : no deformities [General Appearance - In No Acute Distress] : no acute distress [FreeTextEntry1] : sitting in wheelchair [Heart Rate And Rhythm] : heart rate and rhythm were normal [Heart Sounds] : normal S1 and S2 [Edema] : no peripheral edema present [] : no respiratory distress [Respiration, Rhythm And Depth] : normal respiratory rhythm and effort [Exaggerated Use Of Accessory Muscles For Inspiration] : no accessory muscle use [Auscultation Breath Sounds / Voice Sounds] : lungs were clear to auscultation bilaterally [Left Infraclavicular] : left infraclavicular area [Well-Healed] : well-healed [Erythema] : not erythematous [Abdomen Soft] : soft [Nail Clubbing] : no clubbing of the fingernails

## 2024-04-17 NOTE — HISTORY OF PRESENT ILLNESS
[de-identified] : Cardiologist: Dr. Aguilar  89 yo F with history of CHB s/p DC PPM (4/30/2010) s/p BiV ICD upgrade (9/19/2012 by Dr. Cordova with capped RA and RV pacing lead), s/p BiV ICD gen change (2017), gen change 9/8/2023, chronic AFib (not on AC due to history of recurrent falls), here for routine follow up.   The patient is feeling well and has no cardiac complaints. Denies CP, palpitations, SOB, dizziness, POPE, PND, syncope.

## 2024-04-17 NOTE — ASSESSMENT
[FreeTextEntry1] : 89 years old with CRTD in VVI mode.   # Chronic AFib  - Not on OAC due to high fall risk  - Cont Aspirin - Continue carvedilol for rate control.   # CHB s/p BiV ICD in VVI mode s/p gen changed on 9/8/2023 - Capped RA lead.   -Optivol shows no congestion. - Device interrogation normal. I interrogated and reprogrammed the device as described above.  - No events. - The patient is on remote and transmitting.  - Was considering adding rate response however patient mostly sedentary.   # RTO in 9-12 months   I have also advised the patient to go to the nearest emergency room if she experiences any chest pain, dyspnea, syncope, or has any other compelling symptoms.

## 2024-04-17 NOTE — DISCUSSION/SUMMARY
[AICD Function Normal] : normal AICD function [Pulse Generator Replacement] : replace pulse generator

## 2024-04-17 NOTE — PROCEDURE
[Complete Heart Block] : complete heart block [See Device Printout] : See device printout [CRT-D] : Cardiac resynchronization therapy defibrillator [VVI] : VVI [Voltage: ___ volts] : Voltage was [unfilled] volts [Longevity: ___ months] : The estimated remaining battery life is [unfilled] months [Normal] : The battery status is normal. [Threshold Testing Performed] : Threshold testing was performed [Sensing Amplitude ___mv] : sensing amplitude was [unfilled] mv [Lead Imp:  ___ohms] : lead impedance was [unfilled] ohms [___V @] : [unfilled] V [___ ms] : [unfilled] ms [None] : none [Programmed for Longevity] : output reprogrammed for improved battery longevity [Counters Reset] : the counters were reset [de-identified] : AFib [de-identified] : Medtronic Cobalt XT HF CRTD [de-identified] :  [id number] [de-identified] : [id number] [de-identified] : 9/8/2023 [de-identified] : 60 [de-identified] : : 99.5% (BiV: 100%)  No events. Normal device function.  Transmitting on [employer].

## 2024-07-12 NOTE — ASU PATIENT PROFILE, ADULT - MEDICATION ADMINISTRATION INFO, PROFILE
Goal Outcome Evaluation:  Plan of Care Reviewed With: patient        Progress: no change  Outcome Evaluation: Patient received medications. Patient has chest tube, drainage appears the same compared to previous day. Patient continues to have blood from rectum- MD aware. Chest tube to water seal. Patient is on 2-3L nc. Patient's potassium replaced.                                no concerns

## 2024-07-17 ENCOUNTER — NON-APPOINTMENT (OUTPATIENT)
Age: 89
End: 2024-07-17

## 2024-07-17 ENCOUNTER — APPOINTMENT (OUTPATIENT)
Dept: CARDIOLOGY | Facility: CLINIC | Age: 89
End: 2024-07-17
Payer: MEDICARE

## 2024-07-17 PROCEDURE — 93296 REM INTERROG EVL PM/IDS: CPT

## 2024-07-17 PROCEDURE — 93295 DEV INTERROG REMOTE 1/2/MLT: CPT

## 2024-09-17 NOTE — PRE-ANESTHESIA EVALUATION ADULT - NSANTHALCOHOLSD_GEN_ALL_CORE
Detail Level: Generalized Quality 130: Documentation Of Current Medications In The Medical Record: Current Medications Documented Quality 431: Preventive Care And Screening: Unhealthy Alcohol Use - Screening: Patient not identified as an unhealthy alcohol user when screened for unhealthy alcohol use using a systematic screening method Quality 226: Preventive Care And Screening: Tobacco Use: Screening And Cessation Intervention: Patient screened for tobacco use and is an ex/non-smoker No

## 2024-10-16 ENCOUNTER — APPOINTMENT (OUTPATIENT)
Dept: CARDIOLOGY | Facility: CLINIC | Age: 89
End: 2024-10-16
Payer: MEDICARE

## 2024-10-16 ENCOUNTER — NON-APPOINTMENT (OUTPATIENT)
Age: 89
End: 2024-10-16

## 2024-10-16 PROCEDURE — 93296 REM INTERROG EVL PM/IDS: CPT

## 2024-10-16 PROCEDURE — 93295 DEV INTERROG REMOTE 1/2/MLT: CPT

## 2024-11-07 ENCOUNTER — INPATIENT (INPATIENT)
Facility: HOSPITAL | Age: 88
LOS: 13 days | Discharge: NURSING, REHAB OR LONG-TERM CARE | DRG: 389 | End: 2024-11-21
Attending: INTERNAL MEDICINE | Admitting: STUDENT IN AN ORGANIZED HEALTH CARE EDUCATION/TRAINING PROGRAM
Payer: MEDICARE

## 2024-11-07 ENCOUNTER — TRANSCRIPTION ENCOUNTER (OUTPATIENT)
Age: 89
End: 2024-11-07

## 2024-11-07 VITALS
TEMPERATURE: 99 F | SYSTOLIC BLOOD PRESSURE: 130 MMHG | RESPIRATION RATE: 18 BRPM | HEART RATE: 60 BPM | OXYGEN SATURATION: 98 % | DIASTOLIC BLOOD PRESSURE: 64 MMHG | WEIGHT: 134.92 LBS

## 2024-11-07 DIAGNOSIS — Z98.49 CATARACT EXTRACTION STATUS, UNSPECIFIED EYE: Chronic | ICD-10-CM

## 2024-11-07 DIAGNOSIS — Z96.659 PRESENCE OF UNSPECIFIED ARTIFICIAL KNEE JOINT: Chronic | ICD-10-CM

## 2024-11-07 DIAGNOSIS — Z96.649 PRESENCE OF UNSPECIFIED ARTIFICIAL HIP JOINT: Chronic | ICD-10-CM

## 2024-11-07 DIAGNOSIS — E87.6 HYPOKALEMIA: ICD-10-CM

## 2024-11-07 LAB
ALBUMIN SERPL ELPH-MCNC: 3.1 G/DL — LOW (ref 3.5–5.2)
ALP SERPL-CCNC: 72 U/L — SIGNIFICANT CHANGE UP (ref 30–115)
ALT FLD-CCNC: 6 U/L — SIGNIFICANT CHANGE UP (ref 0–41)
ANION GAP SERPL CALC-SCNC: 10 MMOL/L — SIGNIFICANT CHANGE UP (ref 7–14)
APTT BLD: 34.2 SEC — SIGNIFICANT CHANGE UP (ref 27–39.2)
AST SERPL-CCNC: 14 U/L — SIGNIFICANT CHANGE UP (ref 0–41)
BASE EXCESS BLDV CALC-SCNC: 4.4 MMOL/L — HIGH (ref -2–3)
BASE EXCESS BLDV CALC-SCNC: 4.5 MMOL/L — HIGH (ref -2–3)
BILIRUB SERPL-MCNC: 0.6 MG/DL — SIGNIFICANT CHANGE UP (ref 0.2–1.2)
BLD GP AB SCN SERPL QL: SIGNIFICANT CHANGE UP
BUN SERPL-MCNC: 13 MG/DL — SIGNIFICANT CHANGE UP (ref 10–20)
CA-I SERPL-SCNC: 1.42 MMOL/L — HIGH (ref 1.15–1.33)
CA-I SERPL-SCNC: 1.47 MMOL/L — HIGH (ref 1.15–1.33)
CALCIUM SERPL-MCNC: 10 MG/DL — SIGNIFICANT CHANGE UP (ref 8.4–10.4)
CHLORIDE SERPL-SCNC: 106 MMOL/L — SIGNIFICANT CHANGE UP (ref 98–110)
CO2 SERPL-SCNC: 25 MMOL/L — SIGNIFICANT CHANGE UP (ref 17–32)
CREAT SERPL-MCNC: 0.8 MG/DL — SIGNIFICANT CHANGE UP (ref 0.7–1.5)
EGFR: 68 ML/MIN/1.73M2 — SIGNIFICANT CHANGE UP
GAS PNL BLDV: 134 MMOL/L — LOW (ref 136–145)
GAS PNL BLDV: 137 MMOL/L — SIGNIFICANT CHANGE UP (ref 136–145)
GAS PNL BLDV: SIGNIFICANT CHANGE UP
GLUCOSE SERPL-MCNC: 126 MG/DL — HIGH (ref 70–99)
HCO3 BLDV-SCNC: 28 MMOL/L — SIGNIFICANT CHANGE UP (ref 22–29)
HCO3 BLDV-SCNC: 28 MMOL/L — SIGNIFICANT CHANGE UP (ref 22–29)
HCT VFR BLD CALC: 31.2 % — LOW (ref 37–47)
HCT VFR BLDA CALC: 32 % — LOW (ref 34.5–46.5)
HCT VFR BLDA CALC: 35 % — SIGNIFICANT CHANGE UP (ref 34.5–46.5)
HGB BLD CALC-MCNC: 10.6 G/DL — LOW (ref 11.7–16.1)
HGB BLD CALC-MCNC: 11.6 G/DL — LOW (ref 11.7–16.1)
HGB BLD-MCNC: 10.3 G/DL — LOW (ref 12–16)
INR BLD: 1.56 RATIO — HIGH (ref 0.65–1.3)
LACTATE BLDV-MCNC: 0.7 MMOL/L — SIGNIFICANT CHANGE UP (ref 0.5–2)
LACTATE BLDV-MCNC: 0.9 MMOL/L — SIGNIFICANT CHANGE UP (ref 0.5–2)
MAGNESIUM SERPL-MCNC: 1.7 MG/DL — LOW (ref 1.8–2.4)
MCHC RBC-ENTMCNC: 30 PG — SIGNIFICANT CHANGE UP (ref 27–31)
MCHC RBC-ENTMCNC: 33 G/DL — SIGNIFICANT CHANGE UP (ref 32–37)
MCV RBC AUTO: 91 FL — SIGNIFICANT CHANGE UP (ref 81–99)
NRBC # BLD: 0 /100 WBCS — SIGNIFICANT CHANGE UP (ref 0–0)
PCO2 BLDV: 38 MMHG — LOW (ref 39–42)
PCO2 BLDV: 39 MMHG — SIGNIFICANT CHANGE UP (ref 39–42)
PH BLDV: 7.47 — HIGH (ref 7.32–7.43)
PH BLDV: 7.48 — HIGH (ref 7.32–7.43)
PLATELET # BLD AUTO: 313 K/UL — SIGNIFICANT CHANGE UP (ref 130–400)
PMV BLD: 10.7 FL — HIGH (ref 7.4–10.4)
PO2 BLDV: 54 MMHG — HIGH (ref 25–45)
PO2 BLDV: 55 MMHG — HIGH (ref 25–45)
POTASSIUM BLDV-SCNC: 2.3 MMOL/L — CRITICAL LOW (ref 3.5–5.1)
POTASSIUM BLDV-SCNC: 3.5 MMOL/L — SIGNIFICANT CHANGE UP (ref 3.5–5.1)
POTASSIUM SERPL-MCNC: 2.7 MMOL/L — CRITICAL LOW (ref 3.5–5)
POTASSIUM SERPL-SCNC: 2.7 MMOL/L — CRITICAL LOW (ref 3.5–5)
PROT SERPL-MCNC: 5.8 G/DL — LOW (ref 6–8)
PROTHROM AB SERPL-ACNC: 18.6 SEC — HIGH (ref 9.95–12.87)
RBC # BLD: 3.43 M/UL — LOW (ref 4.2–5.4)
RBC # FLD: 14.1 % — SIGNIFICANT CHANGE UP (ref 11.5–14.5)
SAO2 % BLDV: 88.4 % — HIGH (ref 67–88)
SAO2 % BLDV: 90 % — HIGH (ref 67–88)
SODIUM SERPL-SCNC: 141 MMOL/L — SIGNIFICANT CHANGE UP (ref 135–146)
WBC # BLD: 9.27 K/UL — SIGNIFICANT CHANGE UP (ref 4.8–10.8)
WBC # FLD AUTO: 9.27 K/UL — SIGNIFICANT CHANGE UP (ref 4.8–10.8)

## 2024-11-07 PROCEDURE — 85025 COMPLETE CBC W/AUTO DIFF WBC: CPT

## 2024-11-07 PROCEDURE — 80048 BASIC METABOLIC PNL TOTAL CA: CPT

## 2024-11-07 PROCEDURE — 99223 1ST HOSP IP/OBS HIGH 75: CPT

## 2024-11-07 PROCEDURE — 87086 URINE CULTURE/COLONY COUNT: CPT

## 2024-11-07 PROCEDURE — 97166 OT EVAL MOD COMPLEX 45 MIN: CPT | Mod: GO

## 2024-11-07 PROCEDURE — 97162 PT EVAL MOD COMPLEX 30 MIN: CPT | Mod: GP

## 2024-11-07 PROCEDURE — 80053 COMPREHEN METABOLIC PANEL: CPT

## 2024-11-07 PROCEDURE — 83605 ASSAY OF LACTIC ACID: CPT

## 2024-11-07 PROCEDURE — 97530 THERAPEUTIC ACTIVITIES: CPT | Mod: GP

## 2024-11-07 PROCEDURE — 74018 RADEX ABDOMEN 1 VIEW: CPT

## 2024-11-07 PROCEDURE — 74177 CT ABD & PELVIS W/CONTRAST: CPT | Mod: 26,MC

## 2024-11-07 PROCEDURE — 85027 COMPLETE CBC AUTOMATED: CPT

## 2024-11-07 PROCEDURE — 99285 EMERGENCY DEPT VISIT HI MDM: CPT

## 2024-11-07 PROCEDURE — 71045 X-RAY EXAM CHEST 1 VIEW: CPT

## 2024-11-07 PROCEDURE — 36415 COLL VENOUS BLD VENIPUNCTURE: CPT

## 2024-11-07 PROCEDURE — 81001 URINALYSIS AUTO W/SCOPE: CPT

## 2024-11-07 PROCEDURE — 83735 ASSAY OF MAGNESIUM: CPT

## 2024-11-07 RX ORDER — 0.9 % SODIUM CHLORIDE 0.9 %
1000 INTRAVENOUS SOLUTION INTRAVENOUS ONCE
Refills: 0 | Status: COMPLETED | OUTPATIENT
Start: 2024-11-07 | End: 2024-11-07

## 2024-11-07 RX ORDER — ONDANSETRON HYDROCHLORIDE 4 MG/1
4 TABLET, FILM COATED ORAL ONCE
Refills: 0 | Status: DISCONTINUED | OUTPATIENT
Start: 2024-11-08 | End: 2024-11-21

## 2024-11-07 RX ORDER — POTASSIUM CHLORIDE 600 MG/1
40 TABLET, EXTENDED RELEASE ORAL ONCE
Refills: 0 | Status: COMPLETED | OUTPATIENT
Start: 2024-11-07 | End: 2024-11-07

## 2024-11-07 RX ORDER — POTASSIUM CHLORIDE 600 MG/1
20 TABLET, EXTENDED RELEASE ORAL
Refills: 0 | Status: DISCONTINUED | OUTPATIENT
Start: 2024-11-07 | End: 2024-11-08

## 2024-11-07 RX ADMIN — Medication 1000 MILLILITER(S): at 16:41

## 2024-11-07 RX ADMIN — POTASSIUM CHLORIDE 50 MILLIEQUIVALENT(S): 600 TABLET, EXTENDED RELEASE ORAL at 21:28

## 2024-11-07 RX ADMIN — POTASSIUM CHLORIDE 40 MILLIEQUIVALENT(S): 600 TABLET, EXTENDED RELEASE ORAL at 21:28

## 2024-11-07 RX ADMIN — Medication 25 GRAM(S): at 18:36

## 2024-11-07 NOTE — H&P ADULT - HISTORY OF PRESENT ILLNESS
89-year-old female patient with history of hypertension, dyslipidemia, sinus bradycardia status post permanent pacemaker several years ago, congestive heart failure status post AICD, possible history of DVT status post IVC filter on Eliquis, and recent left hip fracture status post replacement around 10 days ago at [facility], [place] who was brought to the ED on November 7 for evaluation of hypokalemia in the setting of 2-day history of colonic ileus on KUB at the nursing home.  She was found to have hypokalemia and evidence of questionable twisting at the sigmoid suggestive of volvulus with moderate gaseous distention of the colon.  Patient being admitted for urgent surgical management. CONFIRM MED RECC IN AM    ED course:   · BP Systolic  130 mm Hg  · BP Diastolic  64 mm Hg  · Heart Rate  60 /min  · Respiration Rate (breaths/min)  18 /min  · Temp (F)  99.1 Degrees F  · Temp (C)  37.3 Degrees C  · Temp site  oral  · SpO2 (%)  98 %  · O2 Delivery/Oxygen Delivery Method  room air  · Temp at ED Arrival (C)  37.3 Degrees C    CTAP IV cont:   IMPRESSION:  Diffuse distention of the sigmoid colon. Possible area of sigmoid colon   twisting which may represent a sigmoid volvulus.    Urinary bladder inflammatory changes which can be seen with cystitis.   Recommend correlation with urinalysis

## 2024-11-07 NOTE — ED PROVIDER NOTE - CARE PLAN
Principal Discharge DX:	Hypokalemia   1 Principal Discharge DX:	Hypokalemia  Secondary Diagnosis:	Sigmoid volvulus

## 2024-11-07 NOTE — ED ADULT TRIAGE NOTE - CHIEF COMPLAINT QUOTE
BIBEMS from [facility] for potassium of 2.5. Denies any symptoms upon arrival.

## 2024-11-07 NOTE — H&P ADULT - ASSESSMENT
89-year-old female patient with history of hypertension, dyslipidemia, sinus bradycardia status post permanent pacemaker several years ago, congestive heart failure status post AICD, history of DVT status post IVC filter on Eliquis, and recent left hip fracture status post replacement around 10 days ago at [facility] who was brought to the ED on November 7 for evaluation of hypokalemia in the setting of 2-day history of colonic ileus on KUB at the nursing home.  She was found to have hypokalemia and evidence of questionable twisting at the sigmoid suggestive of volvulus with moderate gaseous distention of the colon.  Patient being admitted for urgent surgical management. CONFIRM MED RECC IN AM      #Acute sigmoid volvulus with moderate gaseous distention of the colon  #Recent moderate colonic ileus status post recent hip replacement  #Chronic constipation  #Chronic anemia with no overt GI bleed  #RLQ calcified 2.6 cm density  - Was admitted recently to [facility] for left hip fracture status post left hip replacement around 10 days ago  - Reports chronic constipation with 2 days of left lower quadrant discomfort (nonradiating, mild around 2/10 in intensity); last flatus 2 days ago; no nausea or vomiting or fever or chills; reports intentional subjective weight loss; no melena or hematochezia; no dysphagia.  She was evaluated at the nursing home: Status post KUB on November 5 in November 6 showing stable moderate colonic ileus.  Status post blood work showing potassium of 2.5 at the nursing home on November 5  - Currently hemodynamically stable  -  Labs: WBC 9.27, hemoglobin 10.3, platelet 313, sodium 141, potassium 2.7 status post IV 20 mics x 3 and p.o. 40 mics x 1, BUN 13, creatinine 0.8, magnesium 1.7 status post 2 g IV x 1  -  INR pending  -  Lactate 0.7-   - CT abdomen pelvis with IV contrast revealed questionable twisting of the sigmoid mesentery suggestive of volvulus, moderate gaseous distention of the colon with liquid stool in the rectum, right lower quadrant calcified 2.6 cm density  - No previous EGD or colonoscopy per patient and nephew  - No family history of GI malignancies  - urgent surgical intervention, f/u surgery   - npo w/ngt to intermittent suction   -  serial abdominal exam  - daily KUBs (outpatient KUB on 11/05 and 11/06 from [facility] showed moderate chronic ileus)  - Monitor bowel movements and avoid constipation: Would start bowel regimen orally after decompression  - Monitor for flatus  - Recommend PT/OT and incentive spirometry  - Monitor electrolytes and replete as indicated: Potassium at 5 PM was 2.7 status post IV 20 mics x 3 and p.o. 40 mics x 1 -> asked ED team to check stat potassium at 9:30 PM.  Magnesium was 1.7 at 5 PM-> status post 2 g IV x 1  - Trend lactic acid  - Avoid calcium channel blocker, sedatives, opioids, and anticholinergics  - Trend CBC and keep active type and screen.  In the setting of stable hemoglobin and absence of overt bleeding, would continue necessity anticoagulation if deemed necessary and if benefits outweigh risks    #HTN  #DLD  #HFrEF s/p AICD   #hx dvt pe on eliquis   #sss s/p ppm  - confirm meds in AM       #Admit medicine   - dvt proph eliquis once surgery done and meds confirmed   - activity iat   - diet npo   - f/u surgery  89-year-old female patient with history of hypertension, dyslipidemia, sinus bradycardia status post permanent pacemaker several years ago, congestive heart failure status post AICD, history of DVT status post IVC filter on Eliquis, and recent left hip fracture status post replacement around 10 days ago at [facility] [place] who was brought to the ED on November 7 for evaluation of hypokalemia in the setting of 2-day history of colonic ileus on KUB at the nursing home.  She was found to have hypokalemia and evidence of questionable twisting at the sigmoid suggestive of volvulus with moderate gaseous distention of the colon.  Patient being admitted for urgent surgical vs flex sig management. CONFIRM MED RECC IN AM      #Acute sigmoid volvulus with moderate gaseous distention of the colon  #Recent moderate colonic ileus status post recent hip replacement  #Chronic constipation  #Chronic anemia with no overt GI bleed  #RLQ calcified 2.6 cm density  - flex sig w/GI overnight  - Was admitted recently to [facility] [place] for left hip fracture status post left hip replacement around 10 days ago  - Reports chronic constipation with 2 days of left lower quadrant discomfort (nonradiating, mild around 2/10 in intensity); last flatus 2 days ago; no nausea or vomiting or fever or chills; reports intentional subjective weight loss; no melena or hematochezia; no dysphagia.  She was evaluated at the nursing home: Status post KUB on November 5 in November 6 showing stable moderate colonic ileus.  Status post blood work showing potassium of 2.5 at the nursing home on November 5  - Currently hemodynamically stable  -  Labs: WBC 9.27, hemoglobin 10.3, platelet 313, sodium 141, potassium 2.7 status post IV 20 mics x 3 and p.o. 40 mics x 1, BUN 13, creatinine 0.8, magnesium 1.7 status post 2 g IV x 1  -  INR pending  -  Lactate 0.7-   - CT abdomen pelvis with IV contrast revealed questionable twisting of the sigmoid mesentery suggestive of volvulus, moderate gaseous distention of the colon with liquid stool in the rectum, right lower quadrant calcified 2.6 cm density  - No previous EGD or colonoscopy per patient and nephew  - No family history of GI malignancies  - urgent surgical intervention, f/u surgery   - npo w/ngt to intermittent suction   -  serial abdominal exam  - daily KUBs (outpatient KUB on 11/05 and 11/06 from [facility] showed moderate chronic ileus)  - Monitor bowel movements and avoid constipation: Would start bowel regimen orally after decompression  - Monitor for flatus  - Recommend PT/OT and incentive spirometry  - Monitor electrolytes and replete as indicated: Potassium at 5 PM was 2.7 status post IV 20 mics x 3 and p.o. 40 mics x 1 -> asked ED team to check stat potassium at 9:30 PM.  Magnesium was 1.7 at 5 PM-> status post 2 g IV x 1  - Trend lactic acid  - Avoid calcium channel blocker, sedatives, opioids, and anticholinergics  - Trend CBC and keep active type and screen.  In the setting of stable hemoglobin and absence of overt bleeding, would continue necessity anticoagulation if deemed necessary and if benefits outweigh risks    #HTN  #DLD  #HFrEF s/p AICD   #hx dvt pe on eliquis   #sss s/p ppm  - confirm meds in AM       #Admit medicine   - dvt proph eliquis once surgery done and meds confirmed   - activity iat   - diet npo   - f/u surgery

## 2024-11-07 NOTE — ED PROVIDER NOTE - PHYSICAL EXAMINATION
VITAL SIGNS: I have reviewed nursing notes and confirm.  CONSTITUTIONAL: Well-appearing, non-toxic, in NAD  SKIN: Warm dry, normal skin turgor  HEAD: NCAT  EYES: No conjunctival injection, scleral anicteric  ENT: Moist mucous membranes, normal pharynx with no erythema or exudates  NECK: Supple; full ROM. Nontender. No cervical LAD  CARD: RRR, no murmurs, rubs or gallops  RESP: Clear to ausculation bilaterally.  No rales, rhonchi, or wheezing.  ABD: Soft, non-distended, non-tender, no rebound or guarding. No CVA tenderness  EXT: Full ROM  NEURO: Normal motor, normal sensory.

## 2024-11-07 NOTE — H&P ADULT - NSHPPHYSICALEXAM_GEN_ALL_CORE
CONSTITUTIONAL: Well-appearing, non-toxic, in NAD  SKIN: Warm dry, normal skin turgor  HEAD: NCAT  EYES: No conjunctival injection, scleral anicteric  ENT: Moist mucous membranes, normal pharynx with no erythema or exudates  NECK: Supple; full ROM. Nontender. No cervical LAD  CARD: RRR, no murmurs, rubs or gallops  RESP: Clear to ausculation bilaterally.  No rales, rhonchi, or wheezing.  ABD: Symmetric, markedly softly distended, no scar, soft, non-tender, bowel sounds active all four quadrants, no masses, no organomegaly (no hepatosplenomegaly)  EXT: normal rom  NEURO: Normal motor, normal sensory.

## 2024-11-07 NOTE — H&P ADULT - NSHPLABSRESULTS_GEN_ALL_CORE
10.3   9.27  )-----------( 313      ( 07 Nov 2024 16:50 )             31.2       11-07    141  |  106  |  13  ----------------------------<  126[H]  2.7[LL]   |  25  |  0.8    Ca    10.0      07 Nov 2024 16:50  Mg     1.7     11-07    TPro  5.8[L]  /  Alb  3.1[L]  /  TBili  0.6  /  DBili  x   /  AST  14  /  ALT  6   /  AlkPhos  72  11-07              PT/INR - ( 07 Nov 2024 21:37 )   PT: 18.60 sec;   INR: 1.56 ratio         PTT - ( 07 Nov 2024 21:37 )  PTT:34.2 sec

## 2024-11-07 NOTE — ED ADULT NURSE NOTE - NSFALLHARMRISKINTERV_ED_ALL_ED
Assistance OOB with selected safe patient handling equipment if applicable/Assistance with ambulation/Communicate risk of Fall with Harm to all staff, patient, and family/Monitor gait and stability/Provide visual cue: red socks, yellow wristband, yellow gown, etc/Reinforce activity limits and safety measures with patient and family/Bed in lowest position, wheels locked, appropriate side rails in place/Call bell, personal items and telephone in reach/Instruct patient to call for assistance before getting out of bed/chair/stretcher/Non-slip footwear applied when patient is off stretcher/[place] to call system/Physically safe environment - no spills, clutter or unnecessary equipment/Purposeful Proactive Rounding/Room/bathroom lighting operational, light cord in reach

## 2024-11-07 NOTE — H&P ADULT - ATTENDING COMMENTS
Patient seen and examined at bedside independently of the residents. I read the resident's note and agree with the plan with the additions and corrections as noted below. My note supersedes the resident's note.     REVIEW OF SYSTEMS:  Negative except in HPI.     PMH:  HTN, HLD, CHB s/p DC PPM s/p BiV ICD upgrade, Chronic A.fib (not on AC due to recurrent fall) and HFpEF (G3DD)    FHx: Reviewed. No fhx of asthma/copd, No fhx of liver and pulmonary disease. No fhx of hematological disorder.     Physical Exam:  GEN: No acute distress. Awake, Alert and oriented x 3.   Head: Atraumatic, Normocephalic.   Eye: PEERLA. No sclera icterus. EOMI.   ENT: Normal oropharynx, no thyromegaly, no mass, no lymphadenopathy.   LUNGS: Clear to auscultation bilaterally. No wheeze/rales/crackles.   HEART: Normal. S1/S2 present. RRR. No murmur/gallops.   ABD: Soft, mild tender, + distended. Bowel sounds present.   EXT: No pitting edema. No erythema. No tenderness.  Integumentary: No rash, No sore, No petechia.   NEURO: CN III-XII intact. Strength: 5/5 b/l ULE. Sensory intact b/l ULE.     Vital Signs Last 24 Hrs  T(C): 37.3 (2024 23:08), Max: 37.3 (2024 15:38)  T(F): 99.1 (2024 15:38), Max: 99.1 (2024 15:38)  HR: 60 (2024 23:08) (60 - 60)  BP: 130/64 (2024 23:08) (130/64 - 130/64)  BP(mean): --  RR: 18 (2024 23:08) (18 - 18)  SpO2: 98% (2024 23:08) (98% - 98%)    Parameters below as of 2024 15:38  Patient On (Oxygen Delivery Method): room air      Please see the above notes for Labs and radiology.     Assessment and Plan:     89 yo F with hx of HTN, HLD, CHB s/p DC PPM s/p BiV ICD upgrade, Chronic A.fib (not on AC due to recurrent fall) and HFpEF (G3DD) presents to ED for generalized abdominal pain, constipation and hypokalemia.     Generalized abdominal pain with constipation likely 2/2 acute sigmoid volvulus   - CT abdomen shows Diffuse distention of the sigmoid colon. Possible area of sigmoid colon twisting which may represent a sigmoid volvulus.  - s/p eval by GI and Surgery in ED.   - NPO with urgent sigmoidoscopy decompression.   - monitor electrolytes and correct prn.   - serial abdominal exam, and KUB in AM.   - F/u GI recommendation post procedure.     Acute cystitis  - CT abdomen shows Urinary bladder inflammatory changes which can be seen with cystitis.   - chec UA, UCx.     Hypokalemia/Hypomagnesemia  - replete electrolytes prn    Chronic A.fib - c/w home med.     HTN/HLD - c/w home med.   HFpEF - c/w home med.    DVT ppx: Hold eliquis   GI ppx: PPI  Diet: NPO   Activity: as tolerated.      Date seen by the attendin2024  I have spent 75 minutes of time on the encounter which excludes teaching and/or separately reported services. Patient seen and examined at bedside independently of the residents. I read the resident's note and agree with the plan with the additions and corrections as noted below. My note supersedes the resident's note.     REVIEW OF SYSTEMS:  Negative except in HPI.     PMH:  HTN, HLD, CHB s/p DC PPM s/p BiV ICD upgrade, Chronic A.fib (not on AC due to recurrent fall) and HFpEF (G3DD)    FHx: Reviewed. No fhx of asthma/copd, No fhx of liver and pulmonary disease. No fhx of hematological disorder.     Physical Exam:  GEN: No acute distress. Awake, Alert and oriented x 3.   Head: Atraumatic, Normocephalic.   Eye: PEERLA. No sclera icterus. EOMI.   ENT: Normal oropharynx, no thyromegaly, no mass, no lymphadenopathy.   LUNGS: Clear to auscultation bilaterally. No wheeze/rales/crackles.   HEART: Normal. S1/S2 present. RRR. No murmur/gallops.   ABD: Soft, mild tender, + distended. Bowel sounds present.   EXT: No pitting edema. No erythema. No tenderness.  Integumentary: No rash, No sore, No petechia.   NEURO: CN III-XII intact. Strength: 5/5 b/l ULE. Sensory intact b/l ULE.     Vital Signs Last 24 Hrs  T(C): 37.3 (2024 23:08), Max: 37.3 (2024 15:38)  T(F): 99.1 (2024 15:38), Max: 99.1 (2024 15:38)  HR: 60 (2024 23:08) (60 - 60)  BP: 130/64 (2024 23:08) (130/64 - 130/64)  BP(mean): --  RR: 18 (2024 23:08) (18 - 18)  SpO2: 98% (2024 23:08) (98% - 98%)    Parameters below as of 2024 15:38  Patient On (Oxygen Delivery Method): room air      Please see the above notes for Labs and radiology.     Assessment and Plan:     89 yo F with hx of HTN, HLD, CHB s/p DC PPM s/p BiV ICD upgrade, Chronic A.fib (not on AC due to recurrent fall) and HFpEF (G3DD) presents to ED for generalized abdominal pain, constipation and hypokalemia.     Generalized abdominal pain with constipation likely 2/2 acute sigmoid volvulus vs. ileus   - CT abdomen shows Diffuse distention of the sigmoid colon. Possible area of sigmoid colon twisting which may represent a sigmoid volvulus.  - s/p eval by GI and Surgery in ED.   - NPO with urgent sigmoidoscopy decompression.   - monitor electrolytes and correct prn.   - serial abdominal exam, and KUB in AM.   - F/u GI recommendation post procedure.     Acute cystitis  - CT abdomen shows Urinary bladder inflammatory changes which can be seen with cystitis.   - chec UA, UCx.     Hypokalemia/Hypomagnesemia  - replete electrolytes prn    Chronic A.fib - c/w home med.     HTN/HLD - c/w home med.   HFpEF - c/w home med.    DVT ppx: Hold eliquis   GI ppx: PPI  Diet: NPO   Activity: as tolerated.      Date seen by the attendin2024  I have spent 75 minutes of time on the encounter which excludes teaching and/or separately reported services.

## 2024-11-07 NOTE — CONSULT NOTE ADULT - ASSESSMENT
ASSESSMENT:  4-year-old female patient with history of hypertension, dyslipidemia, sinus bradycardia status post permanent pacemaker several years ago, congestive heart failure status post AICD, possible history of DVT status post IVC filter on Eliquis, and recent left hip fracture status post replacement around 10 days ago at [facility] who was brought to the ED on November 7 for evaluation of hypokalemia in the setting of 2-day history of colonic ileus on KUB at the nursing home.  She was found to have hypokalemia and evidence of questionable twisting at the sigmoid suggestive of volvulus with moderate gaseous distention of the colon. Surgery consulted for evaluation of concern for volvulus.     PLAN:  -No acute surgical intervention indicated at this time  -Imaging reviewed and low concern for sigmoid volvulus no twisting of mesentery, patient abdominal exam also benign  -Recommend continued care per [place] and GI recommendations, GI to perform Flex Sig  -Patient more likely has pseudoobstruction, recommend avoiding opiates, anticholinergics  -Recommend bowel regiment  -Surgery following    [phone]    Above plan discussed with Attending Surgeon Dr. Jaimes, patient, patient family, and Primary team  11-08-24 @ 01:53

## 2024-11-07 NOTE — ED PROVIDER NOTE - CLINICAL SUMMARY MEDICAL DECISION MAKING FREE TEXT BOX
Patient presented today with decreased bowel movements.  Found to have sigmoid volvulus.  Surgery and GI consulted.  Electrolytes repleted.  Patient was admitted for further evaluation and care.

## 2024-11-07 NOTE — ED PROVIDER NOTE - PROGRESS NOTE DETAILS
Dr. Snehal Anderson, DO: GI and Surgery consulted for sigmoid volvulus Dr. Snehal Anderson, DO: GI planning to take patient for sigmoidoscopy

## 2024-11-07 NOTE — ED PROVIDER NOTE - OBJECTIVE STATEMENT
Patient is a 89 year old female with PMH of HLD, HTN, AICD, and Pacemaker presenting for abnormal lab results. Patient was sent in from [facility] for low potassium. Of note, patient's son reports patient has been constipated and was concerned about a possible obstruction/blockage. Patient is unable to provide further history.

## 2024-11-07 NOTE — PRE-ANESTHESIA EVALUATION ADULT - NSANTHPMHFT_GEN_ALL_CORE
89-year-old female patient with history of hypertension, dyslipidemia, sinus bradycardia status post permanent pacemaker several years ago, congestive heart failure status post AICD, possible history of DVT status post IVC filter on Eliquis, and recent left hip fracture status post replacement around 10 days ago at [facility] who was brought to the ED on November 7 for evaluation of hypokalemia in the setting of 2-day history of colonic ileus on KUB at the nursing home.  She was found to have hypokalemia and evidence of questionable twisting at the sigmoid suggestive of volvulus with moderate gaseous distention of the colon.

## 2024-11-07 NOTE — CONSULT NOTE ADULT - ASSESSMENT
Assessment and Plan  Case of a 89-year-old female patient with history of hypertension, dyslipidemia, sinus bradycardia status post permanent pacemaker several years ago, congestive heart failure status post AICD, possible history of DVT status post IVC filter on Eliquis, and recent left hip fracture status post replacement around 10 days ago at [facility] who was brought to the ED on November 7 for evaluation of hypokalemia in the setting of 2-day history of colonic ileus on KUB at the nursing home.  She was found to have hypokalemia and evidence of questionable twisting at the sigmoid suggestive of volvulus with moderate gaseous distention of the colon.  We are contacted in the setting of concern of sigmoid volvulus.      Acute sigmoid volvulus with moderate gaseous distention of the colon  Recent moderate colonic ileus status post recent hip replacement  Chronic constipation  Chronic anemia with no overt GI bleed  RLQ calcified 2.6 cm density  * Was admitted recently to [facility] for left hip fracture status post left hip replacement around 10 days ago  * Reports chronic constipation with 2 days of left lower quadrant discomfort (nonradiating, mild around 2/10 in intensity); last flatus 2 days ago; no nausea or vomiting or fever or chills; reports intentional subjective weight loss; no melena or hematochezia; no dysphagia.  She was evaluated at the nursing home: Status post KUB on November 5 in November 6 showing stable moderate colonic ileus.  Status post blood work showing potassium of 2.5 at the nursing home on November 5  * Currently hemodynamically stable  * Labs: WBC 9.27, hemoglobin 10.3, platelet 313, sodium 141, potassium 2.7 status post IV 20 mics x 3 and p.o. 40 mics x 1, BUN 13, creatinine 0.8, magnesium 1.7 status post 2 g IV x 1  * INR pending  * Lactate 0.7  * CT abdomen pelvis with IV contrast revealed questionable twisting of the sigmoid mesentery suggestive of volvulus, moderate gaseous distention of the colon with liquid stool in the rectum, right lower quadrant calcified 2.6 cm density  * No previous EGD or colonoscopy per patient and nephew  * No family history of GI malignancies    RECOMMENDATIONS  - Recommend surgery evaluation and critical care evaluation  - Please keep patient NPO for now and consider NG tube to low intermittent suction  - Will perform flexible sigmoidoscopy in the setting of abdominal distention, constipation, recent moderate colonic ileus, hypokalemia, and currently suspected sigmoid volvulus on imaging.  Discussed the plan extensively with the healthcare proxy(nephew) Mr Ernie Donaldson over the phone  - Recommend serial abdominal exam  - Recommend daily KUBs (outpatient KUB on 11/05 and 11/06 from [facility] showed moderate chronic ileus)  - Monitor bowel movements and avoid constipation: Would start bowel regimen orally after decompression  - Monitor for flatus  - Recommend PT/OT and incentive spirometry  - Monitor electrolytes and replete as indicated: Potassium at 5 PM was 2.7 status post IV 20 mics x 3 and p.o. 40 mics x 1 -> asked ED team to check stat potassium at 9:30 PM.  Magnesium was 1.7 at 5 PM-> status post 2 g IV x 1  - Trend lactic acid  - Avoid calcium channel blocker, sedatives, opioids, and anticholinergics  - Trend CBC and keep active type and screen.  In the setting of stable hemoglobin and absence of overt bleeding, would continue necessity anticoagulation if deemed necessary and if benefits outweigh risks  - Recommend outpatient follow-up at the GI MAC clinic in the setting of chronic anemia for further workup if within goals of care.  Discussed with the healthcare proxy but withhold of screening colonoscopy for now        Thank you for your consult.  - Please note that plan was communicated with medical team.   - Please reach GI on [phone] during weekdays till 5pm.  - Please call the GI service line after 5pm on Weekdays and anytime on Weekends: [phone].      Kamar Mercado MD  PGY - 5 Gastroenterology Fellow   [facility]     Assessment and Plan  Case of a 89-year-old female patient with history of hypertension, dyslipidemia, sinus bradycardia status post permanent pacemaker several years ago, congestive heart failure status post AICD, possible history of DVT status post IVC filter on Eliquis, and recent left hip fracture status post replacement around 10 days ago at [facility] who was brought to the ED on November 7 for evaluation of hypokalemia in the setting of 2-day history of colonic ileus on KUB at the nursing home.  She was found to have hypokalemia and evidence of questionable twisting at the sigmoid suggestive of volvulus with moderate gaseous distention of the colon.  We are contacted in the setting of concern of sigmoid volvulus.      Acute sigmoid volvulus with moderate gaseous distention of the colon  Recent moderate colonic ileus status post recent hip replacement  Chronic constipation  Chronic anemia with no overt GI bleed  RLQ calcified 2.6 cm density  * Was admitted recently to [facility] for left hip fracture status post left hip replacement around 10 days ago  * Reports chronic constipation with 2 days of left lower quadrant discomfort (nonradiating, mild around 2/10 in intensity); last flatus 2 days ago; no nausea or vomiting or fever or chills; reports intentional subjective weight loss; no melena or hematochezia; no dysphagia.  She was evaluated at the nursing home: Status post KUB on November 5 in November 6 showing stable moderate colonic ileus.  Status post blood work showing potassium of 2.5 at the nursing home on November 5  * Currently hemodynamically stable  * Labs: WBC 9.27, hemoglobin 10.3, platelet 313, sodium 141, potassium 2.7 status post IV 20 mics x 3 and p.o. 40 mics x 1, BUN 13, creatinine 0.8, magnesium 1.7 status post 2 g IV x 1  * INR pending  * Lactate 0.7  * CT abdomen pelvis with IV contrast revealed questionable twisting of the sigmoid mesentery suggestive of volvulus, moderate gaseous distention of the colon with liquid stool in the rectum, right lower quadrant calcified 2.6 cm density  * No previous EGD or colonoscopy per patient and nephew  * No family history of GI malignancies    RECOMMENDATIONS  - Recommend surgery evaluation and critical care evaluation  - Please keep patient NPO for now and consider NG tube to low intermittent suction  - Will perform flexible sigmoidoscopy in the setting of abdominal distention, constipation, recent moderate colonic ileus, hypokalemia, and currently suspected sigmoid volvulus on imaging.  Discussed the plan extensively with the healthcare proxy(nephew) Mr Ernie Donaldson over the phone  - Discussed GOC with HCP and I was informed that patient was recently made DNR. DNR code status was rescinded in anticipation of procedure. MOLST form to be completed by medicine team  - Recommend serial abdominal exam  - Recommend daily KUBs (outpatient KUB on 11/05 and 11/06 from [facility] showed moderate chronic ileus)  - Monitor bowel movements and avoid constipation: Would start bowel regimen orally after decompression  - Monitor for flatus  - Recommend PT/OT and incentive spirometry  - Monitor electrolytes and replete as indicated: Potassium at 5 PM was 2.7 status post IV 20 mics x 3 and p.o. 40 mics x 1 -> asked ED team to check stat potassium at 9:30 PM.  Magnesium was 1.7 at 5 PM-> status post 2 g IV x 1  - Trend lactic acid  - Avoid calcium channel blocker, sedatives, opioids, and anticholinergics  - Trend CBC and keep active type and screen.  In the setting of stable hemoglobin and absence of overt bleeding, would continue necessity anticoagulation if deemed necessary and if benefits outweigh risks  - Recommend outpatient follow-up at the GI MAC clinic in the setting of chronic anemia for further workup if within goals of care.  Discussed with the healthcare proxy but withhold of screening colonoscopy for now            Thank you for your consult.  - Please note that plan was communicated with medical team.   - Please reach GI on [phone] during weekdays till 5pm.  - Please call the GI service line after 5pm on Weekdays and anytime on Weekends: [phone].      Kamar Mercado MD  PGY - 5 Gastroenterology Fellow   [facility]     Assessment and Plan  Case of a 89-year-old female patient with history of hypertension, dyslipidemia, sinus bradycardia status post permanent pacemaker several years ago, congestive heart failure status post AICD, possible history of DVT status post IVC filter on Eliquis, and recent left hip fracture status post replacement around 10 days ago at [facility] who was brought to the ED on November 7 for evaluation of hypokalemia in the setting of 2-day history of colonic ileus on KUB at the nursing home.  She was found to have hypokalemia and evidence of questionable twisting at the sigmoid suggestive of volvulus with moderate gaseous distention of the colon.  We are contacted in the setting of concern of sigmoid volvulus.      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 with moderate gaseous distention of the colon  Recent moderate colonic ileus in setting of recent hip replacement and hypokalemia (on lasix)  Chronic constipation  Chronic anemia with no overt GI bleed  RLQ calcified 2.6 cm density  * Was admitted recently to [facility] for left hip fracture status post left hip replacement around 10 days ago  * Reports chronic constipation with 2 days of left lower quadrant discomfort (nonradiating, mild around 2/10 in intensity); last flatus 2 days ago; no nausea or vomiting or fever or chills; reports intentional subjective weight loss; no melena or hematochezia; no dysphagia.  She was evaluated at the nursing home: Status post KUB on November 5 in November 6 showing stable moderate colonic ileus.  Status post blood work showing potassium of 2.5 at the nursing home on November 5  * Currently hemodynamically stable  * Labs: WBC 9.27, hemoglobin 10.3, platelet 313, sodium 141, potassium 2.7 status post IV 20 mics x 3 and p.o. 40 mics x 1, BUN 13, creatinine 0.8, magnesium 1.7 status post 2 g IV x 1  * INR pending  * Lactate 0.7  * CT abdomen pelvis with IV contrast revealed questionable twisting of the sigmoid mesentery suggestive of volvulus, moderate gaseous distention of the colon with liquid stool in the rectum, right lower quadrant calcified 2.6 cm density  * No previous EGD or colonoscopy per patient and nephew  * No family history of GI malignancies    RECOMMENDATIONS  - Recommend surgery evaluation and critical care evaluation  - Please keep patient NPO for now and consider NG tube to low intermittent suction  - Will perform flexible sigmoidoscopy in the setting of abdominal distention, constipation, recent moderate colonic ileus, hypokalemia, and currently suspected sigmoid volvulus on imaging.  Discussed the plan extensively with the healthcare proxy(nephew) Mr Ernie Donaldson over the phone  - Discussed GOC with HCP and I was informed that patient was recently made DNR. DNR code status was rescinded in anticipation of procedure. MOLST form to be completed by medicine team  - Recommend serial abdominal exam  - Recommend daily KUBs (outpatient KUB on 11/05 and 11/06 from [facility] showed moderate chronic ileus)  - Monitor bowel movements and avoid constipation: Would start bowel regimen orally after decompression  - Monitor for flatus  - Recommend PT/OT and incentive spirometry  - Monitor electrolytes and replete as indicated: Potassium at 5 PM was 2.7 status post IV 20 mics x 3 and p.o. 40 mics x 1 -> asked ED team to check stat potassium at 9:30 PM.  Magnesium was 1.7 at 5 PM-> status post 2 g IV x 1  - Trend lactic acid  - Avoid calcium channel blocker, sedatives, opioids, and anticholinergics  - Trend CBC and keep active type and screen.  In the setting of stable hemoglobin and absence of overt bleeding, would continue necessity anticoagulation if deemed necessary and if benefits outweigh risks  - Recommend outpatient follow-up at the GI MAC clinic in the setting of chronic anemia for further workup if within goals of care.  Discussed with the healthcare proxy but withhold of screening colonoscopy for now            Thank you for your consult.  - Please note that plan was communicated with medical team.   - Please reach GI on [phone] during weekdays till 5pm.  - Please call the GI service line after 5pm on Weekdays and anytime on Weekends: [phone].      Kamar Mercado MD  PGY - 5 Gastroenterology Fellow   [facility]

## 2024-11-08 LAB
ANION GAP SERPL CALC-SCNC: 8 MMOL/L — SIGNIFICANT CHANGE UP (ref 7–14)
APPEARANCE UR: CLEAR — SIGNIFICANT CHANGE UP
BACTERIA # UR AUTO: ABNORMAL /HPF
BILIRUB UR-MCNC: NEGATIVE — SIGNIFICANT CHANGE UP
BUN SERPL-MCNC: 10 MG/DL — SIGNIFICANT CHANGE UP (ref 10–20)
CALCIUM SERPL-MCNC: 10.1 MG/DL — SIGNIFICANT CHANGE UP (ref 8.4–10.4)
CAST: 1 /LPF — SIGNIFICANT CHANGE UP (ref 0–4)
CHLORIDE SERPL-SCNC: 107 MMOL/L — SIGNIFICANT CHANGE UP (ref 98–110)
CO2 SERPL-SCNC: 27 MMOL/L — SIGNIFICANT CHANGE UP (ref 17–32)
COD CRY URNS QL: PRESENT
COLOR SPEC: YELLOW — SIGNIFICANT CHANGE UP
CREAT SERPL-MCNC: 0.6 MG/DL — LOW (ref 0.7–1.5)
DIFF PNL FLD: ABNORMAL
EGFR: 83 ML/MIN/1.73M2 — SIGNIFICANT CHANGE UP
GLUCOSE SERPL-MCNC: 116 MG/DL — HIGH (ref 70–99)
GLUCOSE UR QL: NEGATIVE MG/DL — SIGNIFICANT CHANGE UP
KETONES UR-MCNC: NEGATIVE MG/DL — SIGNIFICANT CHANGE UP
LEUKOCYTE ESTERASE UR-ACNC: ABNORMAL
MAGNESIUM SERPL-MCNC: 2.1 MG/DL — SIGNIFICANT CHANGE UP (ref 1.8–2.4)
NITRITE UR-MCNC: NEGATIVE — SIGNIFICANT CHANGE UP
PH UR: 6 — SIGNIFICANT CHANGE UP (ref 5–8)
POTASSIUM SERPL-MCNC: 2.9 MMOL/L — LOW (ref 3.5–5)
POTASSIUM SERPL-SCNC: 2.9 MMOL/L — LOW (ref 3.5–5)
PROT UR-MCNC: 30 MG/DL
RBC CASTS # UR COMP ASSIST: 69 /HPF — HIGH (ref 0–4)
SODIUM SERPL-SCNC: 142 MMOL/L — SIGNIFICANT CHANGE UP (ref 135–146)
SP GR SPEC: >1.03 — HIGH (ref 1–1.03)
SQUAMOUS # UR AUTO: 1 /HPF — SIGNIFICANT CHANGE UP (ref 0–5)
URATE CRY FLD QL MICRO: PRESENT
UROBILINOGEN FLD QL: 1 MG/DL — SIGNIFICANT CHANGE UP (ref 0.2–1)
WBC UR QL: 17 /HPF — HIGH (ref 0–5)
YEAST-LIKE CELLS: PRESENT

## 2024-11-08 PROCEDURE — 99232 SBSQ HOSP IP/OBS MODERATE 35: CPT

## 2024-11-08 PROCEDURE — 74018 RADEX ABDOMEN 1 VIEW: CPT | Mod: 26

## 2024-11-08 RX ORDER — AMLODIPINE BESYLATE 10 MG/1
5 TABLET ORAL ONCE
Refills: 0 | Status: COMPLETED | OUTPATIENT
Start: 2024-11-08 | End: 2024-11-08

## 2024-11-08 RX ORDER — LACTULOSE 10 G/15ML
10 SOLUTION ORAL
Refills: 0 | Status: DISCONTINUED | OUTPATIENT
Start: 2024-11-08 | End: 2024-11-10

## 2024-11-08 RX ORDER — POLYETHYLENE GLYCOL 3350 17 G/17G
17 POWDER, FOR SOLUTION ORAL EVERY 12 HOURS
Refills: 0 | Status: DISCONTINUED | OUTPATIENT
Start: 2024-11-08 | End: 2024-11-21

## 2024-11-08 RX ORDER — SENNOSIDES 8.6 MG
2 TABLET ORAL DAILY
Refills: 0 | Status: DISCONTINUED | OUTPATIENT
Start: 2024-11-08 | End: 2024-11-21

## 2024-11-08 RX ORDER — POTASSIUM CHLORIDE 600 MG/1
20 TABLET, EXTENDED RELEASE ORAL
Refills: 0 | Status: COMPLETED | OUTPATIENT
Start: 2024-11-08 | End: 2024-11-08

## 2024-11-08 RX ORDER — 0.9 % SODIUM CHLORIDE 0.9 %
1000 INTRAVENOUS SOLUTION INTRAVENOUS
Refills: 0 | Status: DISCONTINUED | OUTPATIENT
Start: 2024-11-08 | End: 2024-11-13

## 2024-11-08 RX ORDER — 0.9 % SODIUM CHLORIDE 0.9 %
1000 INTRAVENOUS SOLUTION INTRAVENOUS
Refills: 0 | Status: DISCONTINUED | OUTPATIENT
Start: 2024-11-08 | End: 2024-11-08

## 2024-11-08 RX ORDER — ACETAMINOPHEN 500MG 500 MG/1
650 TABLET, COATED ORAL ONCE
Refills: 0 | Status: COMPLETED | OUTPATIENT
Start: 2024-11-08 | End: 2024-11-08

## 2024-11-08 RX ADMIN — LACTULOSE 10 GRAM(S): 10 SOLUTION ORAL at 01:36

## 2024-11-08 RX ADMIN — POLYETHYLENE GLYCOL 3350 17 GRAM(S): 17 POWDER, FOR SOLUTION ORAL at 01:36

## 2024-11-08 RX ADMIN — LACTULOSE 10 GRAM(S): 10 SOLUTION ORAL at 18:04

## 2024-11-08 RX ADMIN — ACETAMINOPHEN 500MG 650 MILLIGRAM(S): 500 TABLET, COATED ORAL at 01:36

## 2024-11-08 RX ADMIN — POLYETHYLENE GLYCOL 3350 17 GRAM(S): 17 POWDER, FOR SOLUTION ORAL at 18:05

## 2024-11-08 RX ADMIN — POTASSIUM CHLORIDE 50 MILLIEQUIVALENT(S): 600 TABLET, EXTENDED RELEASE ORAL at 01:14

## 2024-11-08 RX ADMIN — AMLODIPINE BESYLATE 5 MILLIGRAM(S): 10 TABLET ORAL at 01:36

## 2024-11-08 RX ADMIN — Medication 75 MILLILITER(S): at 06:53

## 2024-11-08 RX ADMIN — POTASSIUM CHLORIDE 50 MILLIEQUIVALENT(S): 600 TABLET, EXTENDED RELEASE ORAL at 06:21

## 2024-11-08 RX ADMIN — Medication 2 TABLET(S): at 01:36

## 2024-11-08 NOTE — PROGRESS NOTE ADULT - ASSESSMENT
ASSESSMENT:  89y F admitted for   with the above physical exam, labs and imaging.   Patient seen and examined at bedside. NAD.  Tolerating Diet:  Diet, NPO (11-08-24 @ 04:34) [Active]      PLAN:  -***  - Monitor vitals  - Monitor labs and replete as necessary  - Monitor for bowel function  - Continue Pain Medications if necessary  - Continue Antibiotics if necessary  - Encourage ambulation as tolerated  - Monitor urine output  - DVT and GI Prophylaxis  - Follow PT/Physiatry if necessary  - Contact social work/case management for necessary patient needs and dispo planning     ASSESSMENT:  89y F admitted for   with the above physical exam, labs and imaging.   Patient seen and examined at bedside. NAD.  Tolerating Diet:  Diet, NPO (11-08-24 @ 04:34) [Active]      PLAN:  - No surgical intervention   - Diet per primary team   - Continue Pain Medications if necessary  - Continue Antibiotics if necessary  - Encourage ambulation as tolerated  - Monitor urine output  - DVT and GI Prophylaxis  - Follow PT/Physiatry if necessary  - Contact social work/case management for necessary patient needs and dispo planning  - Recall surgery if needed  [phone]

## 2024-11-08 NOTE — PROGRESS NOTE ADULT - ASSESSMENT
Dr. Jackelyn Montemayor and Dr. 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     # Acute sigmoid volvulus vs. ileus      likely precipitated by Hypokalemia / Hypomagnesemia     K2.7  Mg 1.7     please repeat labs today and replace accordingly.   - CT abdomen shows Diffuse distention of the sigmoid colon. Possible area of sigmoid colon twisting which may represent a sigmoid volvulus.  - s/p eval by GI and Surgery in ED  - s/p urgent sigmoidoscopy decompression  - f/u KUB shows still dilated sigmoid and other portion of large gut. ? minimal improvement   - f/u for BM, flatus  - miralax q12  - serial abd exams, daily KUB  - GI following  - NPO for now, IVf  - surgery: no acute surgical intervention    #Acute cystitis  - CT abdomen shows Urinary bladder inflammatory changes which can be seen with cystitis.   - f/u UA, UCx.     #Chronic A.fib - c/w home med.     #HTN/HLD - c/w home med.   #HFpEF - c/w home med.    DVT ppx: Hold eliquis   GI ppx: PPI    Diet: NPO for now  Activity: as tolerated.      Pending: NPO/ Ileus / Hypokalemia /PT  Dispo: STR

## 2024-11-08 NOTE — CHART NOTE - NSCHARTNOTEFT_GEN_A_CORE
PACU ANESTHESIA ADMISSION NOTE      Procedure:   Post op diagnosis:      ____  Intubated  TV:______       Rate: ______      FiO2: ______    __x__  Patent Airway    _x___  Full return of protective reflexes    ___x_  Full recovery from anesthesia / back to baseline status    Vitals:  T(C): 37.3 (11-07-24 @ 23:08), Max: 37.3 (11-07-24 @ 15:38)  HR: 60 (11-07-24 @ 23:08) (60 - 60)  BP: 168/81 (11-07-24 @ 23:08) (130/64 - 130/64)  RR: 17 (11-07-24 @ 23:08) (18 - 18)  SpO2: 100% (11-07-24 @ 23:08) (98% - 98%)    Mental Status:  __x__ Awake   ____x_ Alert   _____ Drowsy   _____ Sedated    Nausea/Vomiting:  __x__ NO  ______Yes,   See Post - Op Orders          Pain Scale (0-10):  ___5__    Treatment: ____ None    ___x_ See Post - Op/PCA Orders    Post - Operative Fluids:   ____ Oral   __x__ See Post - Op Orders    Plan: Discharge:   ____Home       __x___Floor     _____Critical Care    _____  Other:_________________    Comments: Transferred care to PACU RN; discharge to floor when criteria met.

## 2024-11-08 NOTE — PROGRESS NOTE ADULT - SUBJECTIVE AND OBJECTIVE BOX
RAMA MARMOLEJO 89y Female  MRN#: [id number]   Hospital Day: 1d    HPI:  89-year-old female patient with history of hypertension, dyslipidemia, sinus bradycardia status post permanent pacemaker several years ago, congestive heart failure status post AICD, possible history of DVT status post IVC filter on Eliquis, and recent left hip fracture status post replacement around 10 days ago at [facility] who was brought to the ED on November 7 for evaluation of hypokalemia in the setting of 2-day history of colonic ileus on KUB at the nursing home.  She was found to have hypokalemia and evidence of questionable twisting at the sigmoid suggestive of volvulus with moderate gaseous distention of the colon.  Patient being admitted for urgent surgical management. CONFIRM MED RECC IN AM    ED course:   · BP Systolic  130 mm Hg  · BP Diastolic  64 mm Hg  · Heart Rate  60 /min  · Respiration Rate (breaths/min)  18 /min  · Temp (F)  99.1 Degrees F  · Temp (C)  37.3 Degrees C  · Temp site  oral  · SpO2 (%)  98 %  · O2 Delivery/Oxygen Delivery Method  room air  · Temp at ED Arrival (C)  37.3 Degrees C    CTAP IV cont:   IMPRESSION:  Diffuse distention of the sigmoid colon. Possible area of sigmoid colon   twisting which may represent a sigmoid volvulus.    Urinary bladder inflammatory changes which can be seen with cystitis.   Recommend correlation with urinalysis     (07 Nov 2024 23:09)    SUBJECTIVE  Patient is a 89y old Female who presents with a chief complaint of low potassium and abd pain, constipation (08 Nov 2024 10:37)  Currently admitted to medicine with the primary diagnosis of Hypokalemia      INTERVAL HPI AND OVERNIGHT EVENTS:  Patient was examined and seen at bedside. This morning she is resting comfortably in bed. No abd pain but also no BMs. Pt not sure if she passed flatus.    OBJECTIVE  PAST MEDICAL & SURGICAL HISTORY  High cholesterol    HTN (hypertension)    Pacemaker    AICD (automatic cardioverter/defibrillator) present    S/P hip replacement  b/l    S/P knee replacement    S/P cataract surgery      ALLERGIES:  codeine (Unknown)  penicillin (Unknown)    MEDICATIONS:  STANDING MEDICATIONS  lactated ringers. 1000 milliLiter(s) IV Continuous <Continuous>  lactulose Syrup 10 Gram(s) Oral two times a day  polyethylene glycol 3350 17 Gram(s) Oral every 12 hours  senna 2 Tablet(s) Oral daily    PRN MEDICATIONS  ondansetron Injectable 4 milliGRAM(s) IV Push once PRN      VITAL SIGNS: Last 24 Hours  T(C): 36.3 (08 Nov 2024 04:00), Max: 37.3 (07 Nov 2024 15:38)  T(F): 97.4 (08 Nov 2024 04:00), Max: 99.1 (07 Nov 2024 15:38)  HR: 60 (08 Nov 2024 04:00) (60 - 60)  BP: 133/68 (08 Nov 2024 04:00) (110/66 - 172/84)  BP(mean): 108 (08 Nov 2024 01:45) (82 - 116)  RR: 18 (07 Nov 2024 23:08) (18 - 18)  SpO2: 98% (08 Nov 2024 04:00) (95% - 98%)    LABS:                        10.3   9.27  )-----------( 313      ( 07 Nov 2024 16:50 )             31.2     11-07    141  |  106  |  13  ----------------------------<  126[H]  2.7[LL]   |  25  |  0.8    Ca    10.0      07 Nov 2024 16:50  Mg     1.7     11-07    TPro  5.8[L]  /  Alb  3.1[L]  /  TBili  0.6  /  DBili  x   /  AST  14  /  ALT  6   /  AlkPhos  72  11-07    PT/INR - ( 07 Nov 2024 21:37 )   PT: 18.60 sec;   INR: 1.56 ratio         PTT - ( 07 Nov 2024 21:37 )  PTT:34.2 sec  Urinalysis Basic - ( 07 Nov 2024 16:50 )    Color: x / Appearance: x / SG: x / pH: x  Gluc: 126 mg/dL / Ketone: x  / Bili: x / Urobili: x   Blood: x / Protein: x / Nitrite: x   Leuk Esterase: x / RBC: x / WBC x   Sq Epi: x / Non Sq Epi: x / Bacteria: x      PHYSICAL EXAM:  CONSTITUTIONAL: No acute distress, alert, awake, cooperative  HEAD: Atraumatic, normocephalic  EYES: EOM intact, conjunctiva and sclera clear  ENT: Supple, no masses, no thyromegaly, no bruits, no JVD; moist mucous membranes  PULMONARY: Clear to auscultation bilaterally; no wheezes, rales, or rhonchi  CARDIOVASCULAR: Regular rate and rhythm; no murmurs, rubs, or gallops  GASTROINTESTINAL: Soft, non-tender, non-distended; bowel sounds present  MUSCULOSKELETAL: 2+ peripheral pulses; no clubbing, no cyanosis  NEUROLOGY: non-focal    ASSESSMENT & PLAN    89 yo F with hx of HTN, HLD, CHB s/p DC PPM s/p BiV ICD upgrade, Chronic A.fib (not on AC due to recurrent fall) and HFpEF (G3DD) presents to ED for generalized abdominal pain, constipation and hypokalemia.     #Generalized abdominal pain with constipation likely 2/2 acute sigmoid volvulus vs. ileus   - CT abdomen shows Diffuse distention of the sigmoid colon. Possible area of sigmoid colon twisting which may represent a sigmoid volvulus.  - s/p eval by GI and Surgery in ED  - s/p urgent sigmoidoscopy decompression  - monitor electrolytes and correct prn.   - f/u KUB read, preliminary looks slightly better per GI  - clinically better this AM, no abd pain  - f/u for BM, flatus  - miralax q12  - serial abd exams, daily KUB  - GI following  - NPO for now, IVf  - surgery: no acute surgical intervention    #Acute cystitis  - CT abdomen shows Urinary bladder inflammatory changes which can be seen with cystitis.   - f/uUA, UCx.     #Hypokalemia/Hypomagnesemia  - replete electrolytes prn    #Chronic A.fib - c/w home med.     #HTN/HLD - c/w home med.   #HFpEF - c/w home med.    DVT ppx: Hold eliquis   GI ppx: PPI     Pt staffed with attending physician Dr. Alvarez  Diet: NPO   Activity: as tolerated.

## 2024-11-08 NOTE — CHART NOTE - NSCHARTNOTEFT_GEN_A_CORE
Flexible Sigmoidoscopy  - Using a PCF scope, the scope was traversed through the anus up to the sigmoid colon. No twisting was noted. The colon was noted to be markedly distended with stool residues throughout. Aggressive suctioning was performed and successful decompression was achieved. At the end of the exam, the abdomen was very soft and non distended.    Plan:	  - Keep NPO for now; recommend IV fluids  - Monitor electrolytes closely and replete as needed   - Recommend serial abdominal exams  - Recommend daily KUBs   - Monitor bowel movements and avoid constipation: recommend bowel regimen   - Monitor for flatus  - Recommend PT/OT and incentive spirometry  - Surgery to be on board  - Will follow Flexible Sigmoidoscopy  - Using a PCF scope, the scope was traversed through the anus up to the sigmoid colon. No twisting was noted. The colon was noted to be markedly distended with stool residues throughout. Aggressive suctioning was performed and successful decompression was achieved. At the end of the exam, the abdomen was very soft and non distended.    Plan:	  - Keep NPO for now; recommend IV fluids  - Monitor electrolytes closely and replete as needed   - Recommend serial abdominal exams  - Recommend daily KUBs   - Monitor bowel movements and avoid constipation: recommend bowel regimen. s/p rectal tube placement in PACU   - Monitor for flatus  - Recommend PT/OT and incentive spirometry  - Surgery to be on board  - Will follow Flexible Sigmoidoscopy  - Using a PCF scope, the scope was traversed through the anus up to the sigmoid colon. No twisting was noted. The colon was noted to be markedly distended with stool residues throughout. Aggressive suctioning was performed and successful decompression was achieved. At the end of the exam, the abdomen was very soft and non distended.    Plan:	  - Keep NPO for now; recommend IV fluids  - Monitor electrolytes closely and replete as needed   - Recommend serial abdominal exams  - Recommend daily KUBs   - Monitor bowel movements and avoid constipation: recommend bowel regimen. s/p rectal tube placement in PACU   - Monitor for flatus  - Recommend PT/OT and incentive spirometry  - Surgery to be on board  - Would ideally benefit from colonoscopy  - Will follow

## 2024-11-08 NOTE — PROGRESS NOTE ADULT - SUBJECTIVE AND OBJECTIVE BOX
GENERAL SURGERY PROGRESS NOTE     Patient: RAMA MARMOLEJO , 89y ([birth year])Female   MRN: [id number]  Location: [address]  Visit: 11-07-24 Inpatient  Date: 11-08-24 @ 10:38        Admitted :11-07-24 (1d)  LOS: 1d    Procedure/Dx/Injuries: Hypokalemia         Events of past 24 hours: Patient seen and examined at bedside. Patient remained hemodynamically stable and is NPO on LR at 75 cc/hr. Rectal tube is in placed and potassium has been repleted  >>> <<<>>> <<<>>> <<<>>> <<<>>> <<<>>> <<<>>> <<<>>> <<<>>> <<<>>> <<<    Vitals:   T(F): 97.4 (11-08-24 @ 04:00), Max: 99.1 (11-07-24 @ 15:38)  HR: 60 (11-08-24 @ 04:00)  BP: 133/68 (11-08-24 @ 04:00)  RR: 18 (11-07-24 @ 23:08)  SpO2: 98% (11-08-24 @ 04:00)      PHYSICAL EXAM:  GENERAL: no acute distress.  CHEST/LUNG: No increased work of breathing  HEART: Regular rate and rhythm; normal S1, S2, No murmurs.  ABDOMEN: Soft, nontender, mildly distended  >>> <<<>>> <<<>>> <<<>>> <<<>>> <<<>>> <<<>>> <<<>>> <<<>>> <<<>>> <<<   Is & Os:   Diet, NPO    Fluids: lactated ringers.: Solution, 1000 milliLiter(s) infuse at 75 mL/Hr, Stop After 12 Hours    >>> <<<>>> <<<>>> <<<>>> <<<>>> <<<>>> <<<>>> <<<>>> <<<>>> <<<>>> <<<    MEDICATIONS  (STANDING):  lactated ringers. 1000 milliLiter(s) (75 mL/Hr) IV Continuous <Continuous>  lactulose Syrup 10 Gram(s) Oral two times a day  polyethylene glycol 3350 17 Gram(s) Oral every 12 hours  senna 2 Tablet(s) Oral daily    MEDICATIONS  (PRN):  ondansetron Injectable 4 milliGRAM(s) IV Push once PRN Nausea and/or Vomiting      DVT PROPHYLAXIS:   GI PROPHYLAXIS:   ANTICOAGULATION:   ANTIBIOTICS:      >>> <<<>>> <<<>>> <<<>>> <<<>>> <<<>>> <<<>>> <<<>>> <<<>>> <<<>>> <<<        LAB/STUDIES:  Labs:  CAPILLARY BLOOD GLUCOSE                              10.3   9.27  )-----------( 313      ( 07 Nov 2024 16:50 )             31.2         11-07    141  |  106  |  13  ----------------------------<  126[H]  2.7[LL]   |  25  |  0.8      Magnesium: 1.7 mg/dL (11-07-24 @ 18:54)      LFTs:             5.8  | 0.6  | 14       ------------------[72      ( 07 Nov 2024 16:50 )  3.1  | x    | 6           Lipase:x      Amylase:x         Blood Gas Venous - Lactate: 0.9 mmol/L (11-07-24 @ 22:06)  Blood Gas Venous - Lactate: 0.7 mmol/L (11-07-24 @ 16:49)      Coags:     18.60  ----< 1.56    ( 07 Nov 2024 21:37 )     34.2                Urinalysis Basic - ( 07 Nov 2024 16:50 )    Color: x / Appearance: x / SG: x / pH: x  Gluc: 126 mg/dL / Ketone: x  / Bili: x / Urobili: x   Blood: x / Protein: x / Nitrite: x   Leuk Esterase: x / RBC: x / WBC x   Sq Epi: x / Non Sq Epi: x / Bacteria: x

## 2024-11-08 NOTE — PROGRESS NOTE ADULT - SUBJECTIVE AND OBJECTIVE BOX
RAMA MARMOLEJO  89y  Female      Patient is a 89y old  Female who presents with a chief complaint of low potassium and abd pain, constipation.    INTERVAL HPI/OVERNIGHT EVENTS:      ******************************* REVIEW OF SYSTEMS:**********************************************    All other review of systems negative    *********************** VITALS ******************************************    T(F): 97.7 (11-08-24 @ 14:06)  HR: 60 (11-08-24 @ 14:06) (60 - 60)  BP: 158/77 (11-08-24 @ 14:06) (110/66 - 172/84)  RR: 18 (11-07-24 @ 23:08) (18 - 18)  SpO2: 97% (11-08-24 @ 14:06) (95% - 98%)            ******************************** PHYSICAL EXAM:**************************************************  GENERAL: NAD    PSYCH: no agitation, ?  baseline mentation  HEENT:     NERVOUS SYSTEM:  Alert & Oriented X 1-2    PULMONARY: ALEE, CTA    CARDIOVASCULAR: S1S2 RRR    GI: Soft, NT, ND; BS present.    EXTREMITIES:  2+ Peripheral Pulses, No clubbing, cyanosis, or edema    LYMPH: No lymphadenopathy noted    SKIN: No rashes or lesions      **************************** LABS *******************************************************                          10.3   9.27  )-----------( 313      ( 07 Nov 2024 16:50 )             31.2     11-07    141  |  106  |  13  ----------------------------<  126[H]  2.7[LL]   |  25  |  0.8    Ca    10.0      07 Nov 2024 16:50  Mg     1.7     11-07    TPro  5.8[L]  /  Alb  3.1[L]  /  TBili  0.6  /  DBili  x   /  AST  14  /  ALT  6   /  AlkPhos  72  11-07      Urinalysis Basic - ( 07 Nov 2024 16:50 )    Color: x / Appearance: x / SG: x / pH: x  Gluc: 126 mg/dL / Ketone: x  / Bili: x / Urobili: x   Blood: x / Protein: x / Nitrite: x   Leuk Esterase: x / RBC: x / WBC x   Sq Epi: x / Non Sq Epi: x / Bacteria: x      PT/INR - ( 07 Nov 2024 21:37 )   PT: 18.60 sec;   INR: 1.56 ratio         PTT - ( 07 Nov 2024 21:37 )  PTT:34.2 sec  Lactate Trend        CAPILLARY BLOOD GLUCOSE              **************************Active Medications *******************************************  codeine (Unknown)  penicillin (Unknown)      lactated ringers. 1000 milliLiter(s) IV Continuous <Continuous>  lactulose Syrup 10 Gram(s) Oral two times a day  ondansetron Injectable 4 milliGRAM(s) IV Push once PRN  polyethylene glycol 3350 17 Gram(s) Oral every 12 hours  senna 2 Tablet(s) Oral daily      ***************************************************  RADIOLOGY & ADDITIONAL TESTS:    Imaging Personally Reviewed:  [ ] YES  [ ] NO    HEALTH ISSUES - PROBLEM Dx:

## 2024-11-08 NOTE — PATIENT PROFILE ADULT - DO YOU FEEL UNSAFE AT HOME, WORK, OR SCHOOL?
Detail Level: Detailed Depth Of Biopsy: dermis Was A Bandage Applied: Yes Size Of Lesion In Cm: 0 Biopsy Type: H and E yes Biopsy Method: Dermablade Anesthesia Type: 1% lidocaine without epinephrine Anesthesia Volume In Cc: 0.5 Hemostasis: Drysol Wound Care: Petrolatum Dressing: bandage Destruction After The Procedure: No Type Of Destruction Used: Curettage Curettage Text: The wound bed was treated with curettage after the biopsy was performed. Cryotherapy Text: The wound bed was treated with cryotherapy after the biopsy was performed. Electrodesiccation Text: The wound bed was treated with electrodesiccation after the biopsy was performed. Electrodesiccation And Curettage Text: The wound bed was treated with electrodesiccation and curettage after the biopsy was performed. Silver Nitrate Text: The wound bed was treated with silver nitrate after the biopsy was performed. Lab: [id number] Lab Facility:  Consent: Written consent was obtained and risks were reviewed including but not limited to scarring, infection, bleeding, scabbing, incomplete removal, nerve damage and allergy to anesthesia. Post-Care Instructions: I reviewed with the patient in detail post-care instructions. Patient is to keep the biopsy site dry overnight, and then apply bacitracin twice daily until healed. Patient may apply hydrogen peroxide soaks to remove any crusting. Notification Instructions: Patient will be notified of biopsy results. However, patient instructed to call the office if not contacted within 2 weeks. Billing Type: Third-Party Bill Information: Selecting Yes will display possible errors in your note based on the variables you have selected. This validation is only offered as a suggestion for you. PLEASE NOTE THAT THE VALIDATION TEXT WILL BE REMOVED WHEN YOU FINALIZE YOUR NOTE. IF YOU WANT TO FAX A PRELIMINARY NOTE YOU WILL NEED TO TOGGLE THIS TO 'NO' IF YOU DO NOT WANT IT IN YOUR FAXED NOTE.

## 2024-11-08 NOTE — PROGRESS NOTE ADULT - SUBJECTIVE AND OBJECTIVE BOX
Interval History:    S/P flex sig on 11/7.   - Diet - NPO   - last BM - today rectal tube   - Abdominal pain - none       PAST MEDICAL & SURGICAL HISTORY:  High cholesterol  HTN (hypertension)  Pacemaker  AICD (automatic cardioverter/defibrillator) present  S/P hip replacement  b/  S/P knee replacement  S/P cataract surgery          MEDICATIONS  (STANDING):  lactated ringers. 1000 milliLiter(s) (75 mL/Hr) IV Continuous <Continuous>  lactulose Syrup 10 Gram(s) Oral two times a day  polyethylene glycol 3350 17 Gram(s) Oral every 12 hours  senna 2 Tablet(s) Oral daily    MEDICATIONS  (PRN):  ondansetron Injectable 4 milliGRAM(s) IV Push once PRN Nausea and/or Vomiting      Allergies  codeine (Unknown)  penicillin (Unknown)      Review of Systems:   Cardiovascular:  No Chest Pain, No Palpitations  Respiratory:  No Cough, No Dyspnea  Gastrointestinal:  As described in HPI  Skin:  No Skin Lesions, No Jaundice  Neuro:  No Syncope, No Dizziness    Physical Examination:  T(C): 36.3 (11-08-24 @ 04:00), Max: 37.3 (11-07-24 @ 15:38)  HR: 60 (11-08-24 @ 04:00) (60 - 60)  BP: 133/68 (11-08-24 @ 04:00) (110/66 - 172/84)  RR: 18 (11-07-24 @ 23:08) (18 - 18)  SpO2: 98% (11-08-24 @ 04:00) (95% - 98%)  Weight (kg): 61.2 (11-07-24 @ 23:08)      GENERAL: no acute distress.  CHEST/LUNG: Clear to auscultation bilaterally; No wheeze, rhonchi, or rales  HEART: Regular rate and rhythm; normal S1, S2, No murmurs.  ABDOMEN: Soft, nontender, mildly distended; Bowel sounds present  NEUROLOGY: No asterixis or tremor.      Data:                        10.3   9.27  )-----------( 313      ( 07 Nov 2024 16:50 )             31.2     Hgb trend:  10.3  11-07-24 @ 16:50      11-07    141  |  106  |  13  ----------------------------<  126[H]  2.7[LL]   |  25  |  0.8    Ca    10.0      07 Nov 2024 16:50  Mg     1.7     11-07    TPro  5.8[L]  /  Alb  3.1[L]  /  TBili  0.6  /  DBili  x   /  AST  14  /  ALT  6   /  AlkPhos  72  11-07    Liver panel trend:  TBili 0.6   /   AST 14   /   ALT 6   /   AlkP 72   /   Tptn 5.8   /   Alb 3.1    /   DBili --      11-07      PT/INR - ( 07 Nov 2024 21:37 )   PT: 18.60 sec;   INR: 1.56 ratio         PTT - ( 07 Nov 2024 21:37 )  PTT:34.2 sec       Radiology:  CT Abdomen and Pelvis w/ IV Cont:   ACC: [id number] EXAM:  CT ABDOMEN AND PELVIS IC   ORDERED BY: RAVI PALOMARES     PROCEDURE DATE:  11/07/2024          INTERPRETATION:  CLINICAL INFORMATION: Abdominal distention    COMPARISON: None.    CONTRAST/COMPLICATIONS:  IV Contrast: Omnipaque 350  100 cc administered   0 cc discarded  Oral Contrast: NONE  Complications: None reported at time of study completion    PROCEDURE:  CT of the Abdomen and Pelvis was performed.  Sagittal and coronal reformats were performed.    FINDINGS:  LOWER CHEST: Bibasilar reticulations. Cardiomegaly. Partially visualized   cardiac lead    LIVER: Within normal limits.  BILE DUCTS: Normal caliber.  GALLBLADDER: Within normal limits.  SPLEEN: Within normal limits.  PANCREAS: Within normal limits.  ADRENALS: Within normal limits.  KIDNEYS/URETERS: Renal cysts and other subcentimeter hypodensities, too   small to further characterize    BLADDER: Chaney catheter within the urinary bladder. Perivesicular fat   stranding  REPRODUCTIVE ORGANS: Calcified uterus    BOWEL: Markedly gaseous distention of the colon with liquid stool within   the rectum. Questionable twisting of the sigmoid colon mesentery (series   4, image 266)  PERITONEUM/RETROPERITONEUM: Right lower quadrant 2.6 cm calcified density  VESSELS: IVC filter.  LYMPH NODES: No lymphadenopathy.  ABDOMINAL WALL: Within normal limits.  BONES: Status post left total hip arthroplasty. Osteopenia. Scoliosis   with multilevel degenerative changes    IMPRESSION:  Diffuse distention of the sigmoid colon. Possible area of sigmoid colon   twisting which may represent a sigmoid volvulus.    Urinary bladder inflammatory changes which can be seen with cystitis.   Recommend correlation with urinalysis    --- End of Report ---            INA YAÑEZ MD;Attending Radiologist  This document has been electronically signed. Nov 7 2024  8:39PM (11-07-24 @ 20:01)

## 2024-11-08 NOTE — CHART NOTE - NSCHARTNOTEFT_GEN_A_CORE
- Mr Klein was contacted over the phone for an update  - Admitting Medical hospitalist was contacted  - Patient examined in PACU: soft, non distended, non tender abdomen  - Will follow

## 2024-11-08 NOTE — PROGRESS NOTE ADULT - ASSESSMENT
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  She was found to have hypokalemia and evidence of questionable twisting at the sigmoid suggestive of volvulus with moderate gaseous distention of the colon.     #Colon distention s/p flex sig and rectal tube placement on 11/7   #Recent moderate colonic ileus in setting of recent hip replacement and hypokalemia  #Chronic constipation  - s/p Flexible Sigmoidoscopy (11/7) : the scope was traversed through the anus up to the sigmoid colon. No twisting was noted. The colon was noted to be markedly distended with stool residues throughout. Aggressive suctioning was performed and successful decompression was achieved. At the end of the exam, the abdomen was very soft and non distended  - rectal tube output 400 cc   - abdominal exam: soft non tender, mildly distended  - surgery following     Plan:	  - Keep NPO for now; recommend IV fluids  - Monitor electrolytes closely and replete as needed   - Recommend serial abdominal exams  - Recommend daily KUBs   - Monitor bowel movements and avoid constipation: recommend bowel regimen. s/p rectal tube placement in PACU   - Monitor for flatus  - Recommend PT/OT and incentive spirometry

## 2024-11-08 NOTE — PATIENT PROFILE ADULT - NS PRO AD NO ADVANCE DIRECTIVE
[facility] with complaints of LEFT great toe pain. Patient reports an item fell on her today    Patient reports have not had a menstrual for 1 month    Denies known Latex allergy or symptoms of Latex sensitivity.    History   Smoking Status   • Passive Smoke Exposure - Never Smoker   Smokeless Tobacco   • Never Used       Medications reviewed and updated.    Patient would like communication of results via:   [] Lea   [] Home Phone: [phone] (home)   [] Work Phone:  There is no work phone number on file.    [x] Cell Phone:    [phone] (mobile)        Emergency Contact: HEMANTH QUIROZ    [] Home:    [id number]     [] Work:         [] Mobile:   [phone]         Emergency Contact: HEMANTH QUIROZ    [] Home:       [] Work:         [] Mobile:  [phone]    [] Letter  Okay to leave message containing results? Yes     No

## 2024-11-08 NOTE — PATIENT PROFILE ADULT - FALL HARM RISK - HARM RISK INTERVENTIONS
Assistance with ambulation/Assistance OOB with selected safe patient handling equipment/Communicate Risk of Fall with Harm to all staff/Discuss with provider need for PT consult/Monitor gait and stability/Provide patient with walking aids - walker, cane, crutches/Reinforce activity limits and safety measures with patient and family/Sit up slowly, dangle for a short time, stand at bedside before walking/Tailored Fall Risk Interventions/Use of alarms - bed, chair and/or voice tab/Visual Cue: Yellow wristband and red socks/Bed in lowest position, wheels locked, appropriate side rails in place/Call bell, personal items and telephone in reach/Instruct patient to call for assistance before getting out of bed or chair/Non-slip footwear when patient is out of bed/[place] to call system/Physically safe environment - no spills, clutter or unnecessary equipment/Purposeful Proactive Rounding/Room/bathroom lighting operational, light cord in reach

## 2024-11-09 LAB
ALBUMIN SERPL ELPH-MCNC: 3 G/DL — LOW (ref 3.5–5.2)
ALP SERPL-CCNC: 77 U/L — SIGNIFICANT CHANGE UP (ref 30–115)
ALT FLD-CCNC: <5 U/L — SIGNIFICANT CHANGE UP (ref 0–41)
ANION GAP SERPL CALC-SCNC: 9 MMOL/L — SIGNIFICANT CHANGE UP (ref 7–14)
AST SERPL-CCNC: 14 U/L — SIGNIFICANT CHANGE UP (ref 0–41)
BILIRUB SERPL-MCNC: 0.6 MG/DL — SIGNIFICANT CHANGE UP (ref 0.2–1.2)
BUN SERPL-MCNC: 9 MG/DL — LOW (ref 10–20)
CALCIUM SERPL-MCNC: 10.2 MG/DL — SIGNIFICANT CHANGE UP (ref 8.4–10.5)
CHLORIDE SERPL-SCNC: 107 MMOL/L — SIGNIFICANT CHANGE UP (ref 98–110)
CO2 SERPL-SCNC: 27 MMOL/L — SIGNIFICANT CHANGE UP (ref 17–32)
CREAT SERPL-MCNC: 0.7 MG/DL — SIGNIFICANT CHANGE UP (ref 0.7–1.5)
EGFR: 80 ML/MIN/1.73M2 — SIGNIFICANT CHANGE UP
GLUCOSE SERPL-MCNC: 96 MG/DL — SIGNIFICANT CHANGE UP (ref 70–99)
HCT VFR BLD CALC: 32.5 % — LOW (ref 37–47)
HGB BLD-MCNC: 10.4 G/DL — LOW (ref 12–16)
MAGNESIUM SERPL-MCNC: 2 MG/DL — SIGNIFICANT CHANGE UP (ref 1.8–2.4)
MCHC RBC-ENTMCNC: 29.5 PG — SIGNIFICANT CHANGE UP (ref 27–31)
MCHC RBC-ENTMCNC: 32 G/DL — SIGNIFICANT CHANGE UP (ref 32–37)
MCV RBC AUTO: 92.3 FL — SIGNIFICANT CHANGE UP (ref 81–99)
NRBC # BLD: 0 /100 WBCS — SIGNIFICANT CHANGE UP (ref 0–0)
PLATELET # BLD AUTO: 294 K/UL — SIGNIFICANT CHANGE UP (ref 130–400)
PMV BLD: 10.8 FL — HIGH (ref 7.4–10.4)
POTASSIUM SERPL-MCNC: 3.4 MMOL/L — LOW (ref 3.5–5)
POTASSIUM SERPL-SCNC: 3.4 MMOL/L — LOW (ref 3.5–5)
PROT SERPL-MCNC: 5.6 G/DL — LOW (ref 6–8)
RBC # BLD: 3.52 M/UL — LOW (ref 4.2–5.4)
RBC # FLD: 13.9 % — SIGNIFICANT CHANGE UP (ref 11.5–14.5)
SODIUM SERPL-SCNC: 143 MMOL/L — SIGNIFICANT CHANGE UP (ref 135–146)
WBC # BLD: 8.95 K/UL — SIGNIFICANT CHANGE UP (ref 4.8–10.8)
WBC # FLD AUTO: 8.95 K/UL — SIGNIFICANT CHANGE UP (ref 4.8–10.8)

## 2024-11-09 PROCEDURE — 74018 RADEX ABDOMEN 1 VIEW: CPT | Mod: 26

## 2024-11-09 PROCEDURE — 99233 SBSQ HOSP IP/OBS HIGH 50: CPT

## 2024-11-09 PROCEDURE — 99232 SBSQ HOSP IP/OBS MODERATE 35: CPT

## 2024-11-09 RX ORDER — ENOXAPARIN SODIUM 30 MG/.3ML
40 INJECTION SUBCUTANEOUS EVERY 24 HOURS
Refills: 0 | Status: DISCONTINUED | OUTPATIENT
Start: 2024-11-09 | End: 2024-11-21

## 2024-11-09 RX ORDER — POTASSIUM CHLORIDE 600 MG/1
20 TABLET, EXTENDED RELEASE ORAL
Refills: 0 | Status: COMPLETED | OUTPATIENT
Start: 2024-11-09 | End: 2024-11-09

## 2024-11-09 RX ORDER — 0.9 % SODIUM CHLORIDE 0.9 %
1000 INTRAVENOUS SOLUTION INTRAVENOUS
Refills: 0 | Status: DISCONTINUED | OUTPATIENT
Start: 2024-11-09 | End: 2024-11-13

## 2024-11-09 RX ORDER — CHLORHEXIDINE GLUCONATE 1.2 MG/ML
1 RINSE ORAL
Refills: 0 | Status: DISCONTINUED | OUTPATIENT
Start: 2024-11-09 | End: 2024-11-21

## 2024-11-09 RX ADMIN — POTASSIUM CHLORIDE 50 MILLIEQUIVALENT(S): 600 TABLET, EXTENDED RELEASE ORAL at 08:39

## 2024-11-09 RX ADMIN — Medication 60 MILLILITER(S): at 12:54

## 2024-11-09 RX ADMIN — POTASSIUM CHLORIDE 50 MILLIEQUIVALENT(S): 600 TABLET, EXTENDED RELEASE ORAL at 06:37

## 2024-11-09 RX ADMIN — LACTULOSE 10 GRAM(S): 10 SOLUTION ORAL at 17:41

## 2024-11-09 RX ADMIN — POLYETHYLENE GLYCOL 3350 17 GRAM(S): 17 POWDER, FOR SOLUTION ORAL at 17:41

## 2024-11-09 RX ADMIN — POTASSIUM CHLORIDE 50 MILLIEQUIVALENT(S): 600 TABLET, EXTENDED RELEASE ORAL at 10:45

## 2024-11-09 NOTE — PROGRESS NOTE ADULT - ASSESSMENT
89 yo F with hx of HTN, HLD, CHB s/p DC PPM s/p BiV ICD upgrade, Chronic A.fib (not on AC due to recurrent fall) and HFpEF (G3DD) presents to ED for generalized abdominal pain, constipation and hypokalemia.     # Acute sigmoid volvulus vs. ileus      likely precipitated by Hypokalemia / Hypomagnesemia     K2.7 >> 2.9 >> 3.4   Mg 1.7  >> 2.0  - CT abdomen shows Diffuse distention of the sigmoid colon. Possible area of sigmoid colon twisting which may represent a sigmoid volvulus.  - s/p eval by GI and Surgery in ED  - s/p urgent sigmoidoscopy decompression  - f/u KUB shows still dilated sigmoid and other portion of large gut. ? minimal improvement    >repeat KUB shows improvement in dilatation.   - on rectal tube, 200 cc output today   - serial abd exams, daily KUB  - GI following  - surgery: no acute surgical intervention    #Acute cystitis  - CT abdomen shows Urinary bladder inflammatory changes which can be seen with cystitis.   - f/u UCx.     #Chronic A.fib - c/w home med.     #HTN/HLD - c/w home med.   #HFpEF - c/w home med.    DVT ppx: Hold eliquis   GI ppx: PPI    Diet: NPO for now  Activity: as tolerated.      Pending: Ileus / Hypokalemia /PT/urine cx   Plan d/w nephew at bedside.   Dispo: STR

## 2024-11-09 NOTE — PROGRESS NOTE ADULT - ASSESSMENT
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  She was found to have hypokalemia and evidence of questionable twisting at the sigmoid suggestive of volvulus with moderate gaseous distention of the colon.     #Colon distention s/p flex sig and rectal tube placement on 11/7   #Recent moderate colonic ileus in setting of recent hip replacement and hypokalemia  #Chronic constipation  - s/p Flexible Sigmoidoscopy (11/7) : the scope was traversed through the anus up to the sigmoid colon. No twisting was noted. The colon was noted to be markedly distended with stool residues throughout. Aggressive suctioning was performed and successful decompression was achieved. At the end of the exam, the abdomen was very soft and non distended  - rectal tube output 400 cc   - abdominal exam: soft non tender, mildly distended  - surgery following   - 11/9: KUB shows improving distention, rectal tube output ~200cc  -     Plan:	  - Keep NPO for now; recommend IV fluids  - Monitor electrolytes closely and replete as needed   - Recommend serial abdominal exams  - Recommend daily KUBs   - maintain rectal tube  - Monitor bowel movements and avoid constipation: recommend bowel regimen.   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  She was found to have hypokalemia and evidence of questionable twisting at the sigmoid suggestive of volvulus with moderate gaseous distention of the colon.     #Colon distention s/p flex sig and rectal tube placement on 11/7   #Recent moderate colonic ileus in setting of recent hip replacement and hypokalemia  #Chronic constipation  - s/p Flexible Sigmoidoscopy (11/7) : the scope was traversed through the anus up to the sigmoid colon. No twisting was noted. The colon was noted to be markedly distended with stool residues throughout. Aggressive suctioning was performed and successful decompression was achieved. At the end of the exam, the abdomen was very soft and non distended  - rectal tube output 400 cc   - abdominal exam: soft non tender, mildly distended  - surgery following   - 11/9: KUB shows improving distention, rectal tube output ~200cc    Plan:	  - may advance to clear liquids for now   - supportive care w/ IV fluids per primary team  - Monitor electrolytes closely and replete as needed to keep K >4 and Mg >2  - Recommend serial abdominal exams  - Recommend daily KUBs   - maintain rectal tube  - Monitor bowel movements and avoid constipation: recommend bowel regimen w/ miralax TID and senna qHS to achieve 1 soft daily BM.   - Recommend PT/OT, encourage ambulation, and incentive spirometry  - we will follow

## 2024-11-09 NOTE — PROGRESS NOTE ADULT - SUBJECTIVE AND OBJECTIVE BOX
RAMA MARMOLEJO  89y  Female      Patient is a 89y old  Female who presents with a chief complaint of low potassium and abd pain, constipation.      INTERVAL HPI/OVERNIGHT EVENTS:      ******************************* REVIEW OF SYSTEMS:**********************************************    All other review of systems negative    *********************** VITALS ******************************************    T(F): 97.5 (11-09-24 @ 14:09)  HR: 60 (11-09-24 @ 14:09) (60 - 61)  BP: 162/76 (11-09-24 @ 14:09) (147/78 - 162/76)  RR: 18 (11-08-24 @ 20:01) (18 - 18)  SpO2: 97% (11-09-24 @ 14:09) (92% - 97%)    11-08-24 @ 07:01  -  11-09-24 @ 07:00  --------------------------------------------------------  IN: 0 mL / OUT: 1150 mL / NET: -1150 mL            11-08-24 @ 07:01  -  11-09-24 @ 07:00  --------------------------------------------------------  IN: 0 mL / OUT: 1150 mL / NET: -1150 mL        ******************************** PHYSICAL EXAM:**************************************************  GENERAL: NAD    PSYCH: no agitation, baseline mentation  HEENT:     NERVOUS SYSTEM:  Alert & Oriented X2-3,     PULMONARY: ALEE, CTA    CARDIOVASCULAR: S1S2 RRR    GI: Soft, NT, ND; BS present.    EXTREMITIES:  2+ Peripheral Pulses, No clubbing, cyanosis, or edema    LYMPH: No lymphadenopathy noted    SKIN: No rashes or lesions      **************************** LABS *******************************************************                          10.4   8.95  )-----------( 294      ( 09 Nov 2024 08:14 )             32.5     11-09    143  |  107  |  9[L]  ----------------------------<  96  3.4[L]   |  27  |  0.7    Ca    10.2      09 Nov 2024 08:14  Mg     2.0     11-09    TPro  5.6[L]  /  Alb  3.0[L]  /  TBili  0.6  /  DBili  x   /  AST  14  /  ALT  <5  /  AlkPhos  77  11-09      Urinalysis Basic - ( 09 Nov 2024 08:14 )    Color: x / Appearance: x / SG: x / pH: x  Gluc: 96 mg/dL / Ketone: x  / Bili: x / Urobili: x   Blood: x / Protein: x / Nitrite: x   Leuk Esterase: x / RBC: x / WBC x   Sq Epi: x / Non Sq Epi: x / Bacteria: x      PT/INR - ( 07 Nov 2024 21:37 )   PT: 18.60 sec;   INR: 1.56 ratio         PTT - ( 07 Nov 2024 21:37 )  PTT:34.2 sec  Lactate Trend        CAPILLARY BLOOD GLUCOSE              **************************Active Medications *******************************************  codeine (Unknown)  penicillin (Unknown)      chlorhexidine 2% Cloths 1 Application(s) Topical <User Schedule>  enoxaparin Injectable 40 milliGRAM(s) SubCutaneous every 24 hours  lactated ringers 1000 milliLiter(s) IV Continuous <Continuous>  lactated ringers. 1000 milliLiter(s) IV Continuous <Continuous>  lactulose Syrup 10 Gram(s) Oral two times a day  ondansetron Injectable 4 milliGRAM(s) IV Push once PRN  polyethylene glycol 3350 17 Gram(s) Oral every 12 hours  senna 2 Tablet(s) Oral daily      ***************************************************  RADIOLOGY & ADDITIONAL TESTS:    Imaging Personally Reviewed:  [ ] YES  [ ] NO    HEALTH ISSUES - PROBLEM Dx:

## 2024-11-09 NOTE — PROGRESS NOTE ADULT - SUBJECTIVE AND OBJECTIVE BOX
Gastroenterology progress note:     Patient is a 89y old  Female who presents with a chief complaint of low potassium and abd pain, constipation (08 Nov 2024 15:56)       Admitted on: 11-07-24    We are following the patient for: elleus       Interval History:    No acute events overnight. Rectal tube output ~200cc KUB improved. Abd exam soft, mildly distended      PAST MEDICAL & SURGICAL HISTORY:  High cholesterol      HTN (hypertension)      Pacemaker      AICD (automatic cardioverter/defibrillator) present      S/P hip replacement  b/l      S/P knee replacement      S/P cataract surgery          MEDICATIONS  (STANDING):  enoxaparin Injectable 40 milliGRAM(s) SubCutaneous every 24 hours  lactated ringers 1000 milliLiter(s) (60 mL/Hr) IV Continuous <Continuous>  lactated ringers. 1000 milliLiter(s) (75 mL/Hr) IV Continuous <Continuous>  lactulose Syrup 10 Gram(s) Oral two times a day  polyethylene glycol 3350 17 Gram(s) Oral every 12 hours  senna 2 Tablet(s) Oral daily    MEDICATIONS  (PRN):  ondansetron Injectable 4 milliGRAM(s) IV Push once PRN Nausea and/or Vomiting      Allergies  codeine (Unknown)  penicillin (Unknown)      Review of Systems:   Cardiovascular:  No Chest Pain, No Palpitations  Respiratory:  No Cough, No Dyspnea  Gastrointestinal:  As described in HPI  Skin:  No Skin Lesions, No Jaundice  Neuro:  No Syncope, No Dizziness    Physical Examination:  T(C): 36.4 (11-09-24 @ 14:09), Max: 36.4 (11-09-24 @ 14:09)  HR: 60 (11-09-24 @ 14:09) (60 - 61)  BP: 162/76 (11-09-24 @ 14:09) (147/78 - 162/76)  RR: 18 (11-08-24 @ 20:01) (18 - 18)  SpO2: 97% (11-09-24 @ 14:09) (92% - 97%)      11-08-24 @ 07:01  -  11-09-24 @ 07:00  --------------------------------------------------------  IN: 0 mL / OUT: 1150 mL / NET: -1150 mL        GENERAL: AAOx3, no acute distress.  HEAD:  Atraumatic, Normocephalic  EYES: conjunctiva and sclera clear  NECK: Supple, no JVD or thyromegaly  CHEST/LUNG: Clear to auscultation bilaterally; No wheeze, rhonchi, or rales  HEART: Regular rate and rhythm; normal S1, S2, No murmurs.  ABDOMEN: soft, mildly distended  NEUROLOGY: No asterixis or tremor.   SKIN: Intact, no jaundice     Data:                        10.4   8.95  )-----------( 294      ( 09 Nov 2024 08:14 )             32.5     Hgb trend:  10.4  11-09-24 @ 08:14  10.3  11-07-24 @ 16:50        11-09    143  |  107  |  9[L]  ----------------------------<  96  3.4[L]   |  27  |  0.7    Ca    10.2      09 Nov 2024 08:14  Mg     2.0     11-09    TPro  5.6[L]  /  Alb  3.0[L]  /  TBili  0.6  /  DBili  x   /  AST  14  /  ALT  <5  /  AlkPhos  77  11-09    Liver panel trend:  TBili 0.6   /   AST 14   /   ALT <5   /   AlkP 77   /   Tptn 5.6   /   Alb 3.0    /   DBili --      11-09  TBili 0.6   /   AST 14   /   ALT 6   /   AlkP 72   /   Tptn 5.8   /   Alb 3.1    /   DBili --      11-07      PT/INR - ( 07 Nov 2024 21:37 )   PT: 18.60 sec;   INR: 1.56 ratio         PTT - ( 07 Nov 2024 21:37 )  PTT:34.2 sec       Radiology:

## 2024-11-10 LAB
ALBUMIN SERPL ELPH-MCNC: 3 G/DL — LOW (ref 3.5–5.2)
ALP SERPL-CCNC: 86 U/L — SIGNIFICANT CHANGE UP (ref 30–115)
ALT FLD-CCNC: <5 U/L — SIGNIFICANT CHANGE UP (ref 0–41)
ANION GAP SERPL CALC-SCNC: 10 MMOL/L — SIGNIFICANT CHANGE UP (ref 7–14)
AST SERPL-CCNC: 12 U/L — SIGNIFICANT CHANGE UP (ref 0–41)
BASOPHILS # BLD AUTO: 0.05 K/UL — SIGNIFICANT CHANGE UP (ref 0–0.2)
BASOPHILS NFR BLD AUTO: 0.5 % — SIGNIFICANT CHANGE UP (ref 0–1)
BILIRUB SERPL-MCNC: 0.6 MG/DL — SIGNIFICANT CHANGE UP (ref 0.2–1.2)
BUN SERPL-MCNC: 8 MG/DL — LOW (ref 10–20)
CALCIUM SERPL-MCNC: 10.2 MG/DL — SIGNIFICANT CHANGE UP (ref 8.4–10.5)
CHLORIDE SERPL-SCNC: 102 MMOL/L — SIGNIFICANT CHANGE UP (ref 98–110)
CO2 SERPL-SCNC: 24 MMOL/L — SIGNIFICANT CHANGE UP (ref 17–32)
CREAT SERPL-MCNC: 0.5 MG/DL — LOW (ref 0.7–1.5)
CULTURE RESULTS: NO GROWTH — SIGNIFICANT CHANGE UP
EGFR: 87 ML/MIN/1.73M2 — SIGNIFICANT CHANGE UP
EOSINOPHIL # BLD AUTO: 0.27 K/UL — SIGNIFICANT CHANGE UP (ref 0–0.7)
EOSINOPHIL NFR BLD AUTO: 2.7 % — SIGNIFICANT CHANGE UP (ref 0–8)
GLUCOSE SERPL-MCNC: 103 MG/DL — HIGH (ref 70–99)
HCT VFR BLD CALC: 32.1 % — LOW (ref 37–47)
HGB BLD-MCNC: 10.6 G/DL — LOW (ref 12–16)
IMM GRANULOCYTES NFR BLD AUTO: 0.5 % — HIGH (ref 0.1–0.3)
LYMPHOCYTES # BLD AUTO: 1.11 K/UL — LOW (ref 1.2–3.4)
LYMPHOCYTES # BLD AUTO: 11 % — LOW (ref 20.5–51.1)
MCHC RBC-ENTMCNC: 30 PG — SIGNIFICANT CHANGE UP (ref 27–31)
MCHC RBC-ENTMCNC: 33 G/DL — SIGNIFICANT CHANGE UP (ref 32–37)
MCV RBC AUTO: 90.9 FL — SIGNIFICANT CHANGE UP (ref 81–99)
MONOCYTES # BLD AUTO: 1 K/UL — HIGH (ref 0.1–0.6)
MONOCYTES NFR BLD AUTO: 9.9 % — HIGH (ref 1.7–9.3)
NEUTROPHILS # BLD AUTO: 7.65 K/UL — HIGH (ref 1.4–6.5)
NEUTROPHILS NFR BLD AUTO: 75.4 % — HIGH (ref 42.2–75.2)
NRBC # BLD: 0 /100 WBCS — SIGNIFICANT CHANGE UP (ref 0–0)
PLATELET # BLD AUTO: 249 K/UL — SIGNIFICANT CHANGE UP (ref 130–400)
PMV BLD: 11.3 FL — HIGH (ref 7.4–10.4)
POTASSIUM SERPL-MCNC: 3.1 MMOL/L — LOW (ref 3.5–5)
POTASSIUM SERPL-SCNC: 3.1 MMOL/L — LOW (ref 3.5–5)
PROT SERPL-MCNC: 5.5 G/DL — LOW (ref 6–8)
RBC # BLD: 3.53 M/UL — LOW (ref 4.2–5.4)
RBC # FLD: 14.1 % — SIGNIFICANT CHANGE UP (ref 11.5–14.5)
SODIUM SERPL-SCNC: 136 MMOL/L — SIGNIFICANT CHANGE UP (ref 135–146)
SPECIMEN SOURCE: SIGNIFICANT CHANGE UP
WBC # BLD: 10.13 K/UL — SIGNIFICANT CHANGE UP (ref 4.8–10.8)
WBC # FLD AUTO: 10.13 K/UL — SIGNIFICANT CHANGE UP (ref 4.8–10.8)

## 2024-11-10 PROCEDURE — 99233 SBSQ HOSP IP/OBS HIGH 50: CPT

## 2024-11-10 PROCEDURE — 99232 SBSQ HOSP IP/OBS MODERATE 35: CPT

## 2024-11-10 PROCEDURE — 74018 RADEX ABDOMEN 1 VIEW: CPT | Mod: 26

## 2024-11-10 RX ORDER — POTASSIUM CHLORIDE 600 MG/1
20 TABLET, EXTENDED RELEASE ORAL
Refills: 0 | Status: COMPLETED | OUTPATIENT
Start: 2024-11-10 | End: 2024-11-11

## 2024-11-10 RX ADMIN — POTASSIUM CHLORIDE 50 MILLIEQUIVALENT(S): 600 TABLET, EXTENDED RELEASE ORAL at 22:13

## 2024-11-10 RX ADMIN — POLYETHYLENE GLYCOL 3350 17 GRAM(S): 17 POWDER, FOR SOLUTION ORAL at 05:09

## 2024-11-10 RX ADMIN — Medication 2 TABLET(S): at 11:32

## 2024-11-10 RX ADMIN — CHLORHEXIDINE GLUCONATE 1 APPLICATION(S): 1.2 RINSE ORAL at 05:09

## 2024-11-10 RX ADMIN — POLYETHYLENE GLYCOL 3350 17 GRAM(S): 17 POWDER, FOR SOLUTION ORAL at 17:38

## 2024-11-10 RX ADMIN — ENOXAPARIN SODIUM 40 MILLIGRAM(S): 30 INJECTION SUBCUTANEOUS at 05:08

## 2024-11-10 RX ADMIN — LACTULOSE 10 GRAM(S): 10 SOLUTION ORAL at 05:08

## 2024-11-10 NOTE — DIETITIAN INITIAL EVALUATION ADULT - ORAL NUTRITION SUPPLEMENTS
Provide Ensure Clear 3x daily (240 kcal, 8 g protein/serving)  Once diet is advanced, provide Magic Cup 3x daily (290 kcal, 9 g protein/serving)

## 2024-11-10 NOTE — PROGRESS NOTE ADULT - SUBJECTIVE AND OBJECTIVE BOX
Gastroenterology progress note:     Patient is a 89y old  Female who presents with a chief complaint of low potassium and abd pain, constipation (10 Nov 2024 10:42)       Admitted on: 11-07-24    We are following the patient for: bowel distention       Interval History:    Patient states she feels well. Abd exam distended but soft, non tender. Rectal tube fell out overnight. She did not have bowel movement today, unsure if passing gas     PAST MEDICAL & SURGICAL HISTORY:  High cholesterol      HTN (hypertension)      Pacemaker      AICD (automatic cardioverter/defibrillator) present      S/P hip replacement  b/l      S/P knee replacement      S/P cataract surgery          MEDICATIONS  (STANDING):  chlorhexidine 2% Cloths 1 Application(s) Topical <User Schedule>  enoxaparin Injectable 40 milliGRAM(s) SubCutaneous every 24 hours  lactated ringers 1000 milliLiter(s) (60 mL/Hr) IV Continuous <Continuous>  lactated ringers. 1000 milliLiter(s) (75 mL/Hr) IV Continuous <Continuous>  polyethylene glycol 3350 17 Gram(s) Oral every 12 hours  senna 2 Tablet(s) Oral daily    MEDICATIONS  (PRN):  ondansetron Injectable 4 milliGRAM(s) IV Push once PRN Nausea and/or Vomiting      Allergies  codeine (Unknown)  penicillin (Unknown)      Review of Systems:   Cardiovascular:  No Chest Pain, No Palpitations  Respiratory:  No Cough, No Dyspnea  Gastrointestinal:  As described in HPI  Skin:  No Skin Lesions, No Jaundice  Neuro:  No Syncope, No Dizziness    Physical Examination:  T(C): 36.6 (11-10-24 @ 12:45), Max: 36.6 (11-10-24 @ 05:00)  HR: 60 (11-10-24 @ 14:40) (60 - 60)  BP: 146/69 (11-10-24 @ 14:40) (135/60 - 174/77)  RR: 18 (11-10-24 @ 12:45) (18 - 18)  SpO2: 96% (11-10-24 @ 12:45) (94% - 96%)      11-09-24 @ 07:01  -  11-10-24 @ 07:00  --------------------------------------------------------  IN: 0 mL / OUT: 600 mL / NET: -600 mL    11-10-24 @ 07:01  -  11-10-24 @ 16:36  --------------------------------------------------------  IN: 0 mL / OUT: 700 mL / NET: -700 mL        GENERAL: AAOx3, no acute distress.  HEAD:  Atraumatic, Normocephalic  EYES: conjunctiva and sclera clear  NECK: Supple, no JVD or thyromegaly  CHEST/LUNG: Clear to auscultation bilaterally; No wheeze, rhonchi, or rales  HEART: Regular rate and rhythm; normal S1, S2, No murmurs.  ABDOMEN: Soft, nontender, distended; Bowel sounds present  NEUROLOGY: No asterixis or tremor.   SKIN: Intact, no jaundice     Data:                        10.4   8.95  )-----------( 294      ( 09 Nov 2024 08:14 )             32.5     Hgb trend:  10.4  11-09-24 @ 08:14  10.3  11-07-24 @ 16:50        11-09    143  |  107  |  9[L]  ----------------------------<  96  3.4[L]   |  27  |  0.7    Ca    10.2      09 Nov 2024 08:14  Mg     2.0     11-09    TPro  5.6[L]  /  Alb  3.0[L]  /  TBili  0.6  /  DBili  x   /  AST  14  /  ALT  <5  /  AlkPhos  77  11-09    Liver panel trend:  TBili 0.6   /   AST 14   /   ALT <5   /   AlkP 77   /   Tptn 5.6   /   Alb 3.0    /   DBili --      11-09  TBili 0.6   /   AST 14   /   ALT 6   /   AlkP 72   /   Tptn 5.8   /   Alb 3.1    /   DBili --      11-07          Culture - Urine (collected 08 Nov 2024 22:59)  Source: Clean Catch Clean Catch (Midstream)  Final Report (10 Nov 2024 12:19):    No growth         Radiology:

## 2024-11-10 NOTE — DIETITIAN INITIAL EVALUATION ADULT - PERTINENT MEDS FT
MEDICATIONS  (STANDING):  chlorhexidine 2% Cloths 1 Application(s) Topical <User Schedule>  enoxaparin Injectable 40 milliGRAM(s) SubCutaneous every 24 hours  lactated ringers 1000 milliLiter(s) (60 mL/Hr) IV Continuous <Continuous>  lactated ringers. 1000 milliLiter(s) (75 mL/Hr) IV Continuous <Continuous>  lactulose Syrup 10 Gram(s) Oral two times a day  polyethylene glycol 3350 17 Gram(s) Oral every 12 hours  senna 2 Tablet(s) Oral daily    MEDICATIONS  (PRN):  ondansetron Injectable 4 milliGRAM(s) IV Push once PRN Nausea and/or Vomiting

## 2024-11-10 NOTE — DIETITIAN INITIAL EVALUATION ADULT - OTHER CALCULATIONS
WEIGHT USED: 61.2 kg - dosing wt   ENERGY: 7406-6737 kcal/day (25-30 kcal/kg)  PROTEIN: 61-73 g/day (1-1.2 g/kg)  FLUID: 1 mL/kcal  -- Estimated needs with consideration for Age, Weight, BMI, acuity of illness

## 2024-11-10 NOTE — DIETITIAN INITIAL EVALUATION ADULT - PERTINENT LABORATORY DATA
11-09    143  |  107  |  9[L]  ----------------------------<  96  3.4[L]   |  27  |  0.7    Ca    10.2      09 Nov 2024 08:14  Mg     2.0     11-09    TPro  5.6[L]  /  Alb  3.0[L]  /  TBili  0.6  /  DBili  x   /  AST  14  /  ALT  <5  /  AlkPhos  77  11-09

## 2024-11-10 NOTE — PROGRESS NOTE ADULT - SUBJECTIVE AND OBJECTIVE BOX
Hospital Day:  3d    Subjective:    Patient is a 89y old  Female who presents with a chief complaint of Hypokalemia     (10 Nov 2024 10:16)    This morning patient is lying in bed comfortably. he reports no new complaints, including SOB, chest pain, abdominal pain, nausea, vomiting, dysuria, constipation, or diarrhea.      Past Medical Hx:   High cholesterol    HTN (hypertension)    Pacemaker    AICD (automatic cardioverter/defibrillator) present      Past Sx:  S/P hip replacement    S/P knee replacement    S/P cataract surgery      Allergies:  codeine (Unknown)  penicillin (Unknown)    Current Meds:   Standng Meds:  chlorhexidine 2% Cloths 1 Application(s) Topical <User Schedule>  enoxaparin Injectable 40 milliGRAM(s) SubCutaneous every 24 hours  lactated ringers 1000 milliLiter(s) (60 mL/Hr) IV Continuous <Continuous>  lactated ringers. 1000 milliLiter(s) (75 mL/Hr) IV Continuous <Continuous>  lactulose Syrup 10 Gram(s) Oral two times a day  polyethylene glycol 3350 17 Gram(s) Oral every 12 hours  senna 2 Tablet(s) Oral daily    PRN Meds:  ondansetron Injectable 4 milliGRAM(s) IV Push once PRN Nausea and/or Vomiting    HOME MEDICATIONS:  amLODIPine 2.5 mg oral tablet: 1 orally  aspirin 81 mg oral tablet, chewable: 1 tab(s) orally once a day  carvedilol 25 mg oral tablet: 1 tab(s) orally 2 times a day  furosemide 20 mg oral tablet: 1 orally  Linzess 145 mcg oral capsule: 1 orally  losartan 25 mg oral tablet: 1 orally      Vital Signs:   T(F): 97.8 (11-10-24 @ 05:00), Max: 97.8 (11-10-24 @ 05:00)  HR: 60 (11-10-24 @ 05:00) (60 - 60)  BP: 161/83 (11-10-24 @ 05:00) (135/60 - 162/76)  RR: 18 (11-10-24 @ 05:00) (18 - 18)  SpO2: 95% (11-10-24 @ 07:45) (94% - 97%)      11-09-24 @ 07:01  -  11-10-24 @ 07:00  --------------------------------------------------------  IN: 0 mL / OUT: 600 mL / NET: -600 mL        Physical Exam:   GENERAL: NAD  HEENT: NCAT  CHEST/LUNG: CTAB  HEART: Regular rate and rhythm; s1 s2 appreciated, No murmurs, rubs, or gallops  ABDOMEN: Soft, Nontender; Bowel sounds present  EXTREMITIES: No LE edema b/l  SKIN: no rashes, no new lesions  NERVOUS SYSTEM:  Alert & Oriented         Labs:                         10.4   8.95  )-----------( 294      ( 09 Nov 2024 08:14 )             32.5       09 Nov 2024 08:14    143    |  107    |  9      ----------------------------<  96     3.4     |  27     |  0.7      Ca    10.2       09 Nov 2024 08:14  Mg     2.0       09 Nov 2024 08:14    TPro  5.6    /  Alb  3.0    /  TBili  0.6    /  DBili  x      /  AST  14     /  ALT  <5     /  AlkPhos  77     09 Nov 2024 08:14                    Urinalysis Basic - ( 09 Nov 2024 08:14 )    Color: x / Appearance: x / SG: x / pH: x  Gluc: 96 mg/dL / Ketone: x  / Bili: x / Urobili: x   Blood: x / Protein: x / Nitrite: x   Leuk Esterase: x / RBC: x / WBC x   Sq Epi: x / Non Sq Epi: x / Bacteria: x                Assessment and Plan:

## 2024-11-10 NOTE — DIETITIAN INITIAL EVALUATION ADULT - NSFNSGIASSESSMENTFT_GEN_A_CORE
rounded, soft/nontender abdomen; improving distention per GI notes; fecal incontience noted   +BM today 11/10   +rectal tube in place 200 mL output noted

## 2024-11-10 NOTE — PROGRESS NOTE ADULT - ASSESSMENT
89-year-old female patient with history of hypertension, dyslipidemia, sinus bradycardia status post permanent pacemaker several years ago, congestive heart failure status post AICD, possible history of DVT status post IVC filter on Eliquis, and recent left hip fracture status post replacement around 10 days ago at [facility] who was brought to the ED on November 7 for evaluation of hypokalemia in the setting of 2-day history of colonic ileus on KUB at the nursing home.  She was found to have hypokalemia and evidence of questionable twisting at the sigmoid suggestive of volvulus with moderate gaseous distention of the colon.     #Colon distention s/p flex sig and rectal tube placement on 11/7   #Recent moderate colonic ileus in setting of recent hip replacement and hypokalemia  #Chronic constipation  - s/p Flexible Sigmoidoscopy (11/7) : the scope was traversed through the anus up to the sigmoid colon. No twisting was noted. The colon was noted to be markedly distended with stool residues throughout. Aggressive suctioning was performed and successful decompression was achieved. At the end of the exam, the abdomen was very soft and non distended  - rectal tube output 400 cc   - abdominal exam: soft non tender, mildly distended  - surgery following   - 11/9: KUB shows improving distention, rectal tube output ~200cc    Plan:  - Monitor bowel movements and avoid constipation: recommend bowel regimen w/ miralax TID and senna qHS to achieve 1 soft daily BM. 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  She was found to have hypokalemia and evidence of questionable twisting at the sigmoid suggestive of volvulus with moderate gaseous distention of the colon.     #Colon distention s/p flex sig and rectal tube placement on 11/7   #Recent moderate colonic ileus in setting of recent hip replacement and hypokalemia  #Chronic constipation  - s/p Flexible Sigmoidoscopy (11/7) : the scope was traversed through the anus up to the sigmoid colon. No twisting was noted. The colon was noted to be markedly distended with stool residues throughout. Aggressive suctioning was performed and successful decompression was achieved. At the end of the exam, the abdomen was very soft and non distended  - rectal tube output 400 cc   - abdominal exam: soft non tender, mildly distended, pt comfortable  - surgery following   - 11/9: KUB shows improving distention, rectal tube output ~200cc    Plan:  - Monitor bowel movements and avoid constipation: recommend bowel regimen w/ miralax TID and senna qHS to achieve 1 soft daily BM, tap water enemas q12, Please DC lactulose   - Please repeat KUB tonight   - cw clear liquids for now   - supportive care w/ IV fluids per primary team  - Monitor electrolytes closely and replete as needed to keep K >4 and Mg >2  - Recommend serial abdominal exams  - Recommend daily KUBs   - Recommend PT/OT, encourage ambulation, and incentive spirometry  - we will follow 89-year-old female patient with history of hypertension, dyslipidemia, sinus bradycardia status post permanent pacemaker several years ago, congestive heart failure status post AICD, possible history of DVT status post IVC filter on Eliquis, and recent left hip fracture status post replacement around 10 days ago at [facility] who was brought to the ED on November 7 for evaluation of hypokalemia in the setting of 2-day history of colonic ileus on KUB at the nursing home.  She was found to have hypokalemia and evidence of questionable twisting at the sigmoid suggestive of volvulus with moderate gaseous distention of the colon.     #Colon distention s/p flex sig and rectal tube placement on 11/7   #Recent moderate colonic ileus in setting of recent hip replacement and hypokalemia  #Chronic constipation  - s/p Flexible Sigmoidoscopy (11/7) : the scope was traversed through the anus up to the sigmoid colon. No twisting was noted. The colon was noted to be markedly distended with stool residues throughout. Aggressive suctioning was performed and successful decompression was achieved. At the end of the exam, the abdomen was very soft and non distended  - rectal tube output 400 cc   - abdominal exam: soft non tender, mildly distended, pt comfortable  - surgery following   - 11/9: KUB shows improving distention, rectal tube output ~200cc    Plan:  - Monitor bowel movements and avoid constipation: recommend bowel regimen w/ miralax TID and senna qHS to achieve 1 soft daily BM, tap water enemas q12, Please DC lactulose   - Please repeat KUB tonight   - Pt preferred not to have rectal tube replaced will consider if recurrent dilation  - cw clear liquids for now   - supportive care w/ IV fluids per primary team  - Monitor electrolytes closely and replete as needed to keep K >4 and Mg >2  - Recommend serial abdominal exams  - Recommend daily KUBs   - Recommend PT/OT, encourage ambulation, and incentive spirometry  - we will follow

## 2024-11-10 NOTE — DIETITIAN INITIAL EVALUATION ADULT - ORAL INTAKE PTA/DIET HISTORY
Patient sleeping at time of RD visit; AAOx2, confusion noted. Unable to obtain nutrition/weight hx from patient at this time. Patient admitted from [facility]- hx limited to physicial chart from NH. Per chart patient with poor PO intake and was made NPO due to ileus PTA. Diet in NH: regular diet with thin liquids. TwoCal  mL 1x daily.   Weight per NH chart- 59.8 kg vs CBW 61.2 kg; Per HIE tab weight on 4/17/24 63.5 vs CBW 61.2- 4% weight loss in > 6 mos insignificant.  No current height on file- Per HIE tab most recent ht 157.5 cm (4/17/24)

## 2024-11-10 NOTE — DIETITIAN INITIAL EVALUATION ADULT - ADD RECOMMEND
Nutrition Intervention: Meals and Snacks, Medical Food Supplements, Coordination of Care  RD to monitor PO/nutrition supplement intake, diet advancement, GI function, Nutrition Labs, Electrolytes, NFPF, Weights    RD to follow at high nutrition risk.

## 2024-11-10 NOTE — DIETITIAN INITIAL EVALUATION ADULT - NSFNSGIIOFT_GEN_A_CORE
IBW- 50 kg       11-09-24 @ 07:01  -  11-10-24 @ 07:00  --------------------------------------------------------  OUT:    Rectal Tube (mL): 200 mL  Total OUT: 200 mL    Total NET: -200 mL

## 2024-11-10 NOTE — PROGRESS NOTE ADULT - ASSESSMENT
89 yo F with hx of HTN, HLD, CHB s/p DC PPM s/p BiV ICD upgrade, Chronic A.fib (not on AC due to recurrent fall) and HFpEF (G3DD) presents to ED for generalized abdominal pain, constipation and hypokalemia.     # Acute sigmoid volvulus vs. ileus      likely precipitated by Hypokalemia / Hypomagnesemia     K2.7 >> 2.9 >> 3.4   Mg 1.7  >> 2.0  - CT abdomen shows Diffuse distention of the sigmoid colon. Possible area of sigmoid colon twisting which may represent a sigmoid volvulus.  - s/p eval by GI and Surgery in ED  - s/p urgent sigmoidoscopy decompression   >repeat KUB shows improvement in dilatation.   - on rectal tube, 200 cc output past 24 hours  - serial abd exams, daily KUB  - GI following  - surgery: no acute surgical intervention    #Acute cystitis  - CT abdomen shows Urinary bladder inflammatory changes which can be seen with cystitis.   - UA positive, but no urinary frequency or dysuria  - f/u UCx.     #Chronic A.fib - c/w home med.     #HTN/HLD - c/w home med.   #HFpEF - c/w home med.    DVT ppx: Hold eliquis   GI ppx: PPI    Diet: NPO for now  Activity: as tolerated.      Pending: Ileus / Hypokalemia /PT/urine cx     Dispo: STR

## 2024-11-10 NOTE — DIETITIAN INITIAL EVALUATION ADULT - OTHER INFO
89 yo F with hx of HTN, HLD, CHB s/p DC PPM s/p BiV ICD upgrade, Chronic A.fib (not on AC due to recurrent fall) and HFpEF (G3DD) presents to ED for generalized abdominal pain, constipation and hypokalemia.   Colon distention s/p flex sig and rectal tube placement on 11/7   Recent moderate colonic ileus in setting of recent hip replacement and hypokalemia  - 11/9: KUB shows improving distention

## 2024-11-11 LAB
ANION GAP SERPL CALC-SCNC: 9 MMOL/L — SIGNIFICANT CHANGE UP (ref 7–14)
BUN SERPL-MCNC: 7 MG/DL — LOW (ref 10–20)
CALCIUM SERPL-MCNC: 10.1 MG/DL — SIGNIFICANT CHANGE UP (ref 8.4–10.4)
CHLORIDE SERPL-SCNC: 102 MMOL/L — SIGNIFICANT CHANGE UP (ref 98–110)
CO2 SERPL-SCNC: 26 MMOL/L — SIGNIFICANT CHANGE UP (ref 17–32)
CREAT SERPL-MCNC: 0.6 MG/DL — LOW (ref 0.7–1.5)
EGFR: 83 ML/MIN/1.73M2 — SIGNIFICANT CHANGE UP
GLUCOSE SERPL-MCNC: 99 MG/DL — SIGNIFICANT CHANGE UP (ref 70–99)
POTASSIUM SERPL-MCNC: 3.8 MMOL/L — SIGNIFICANT CHANGE UP (ref 3.5–5)
POTASSIUM SERPL-SCNC: 3.8 MMOL/L — SIGNIFICANT CHANGE UP (ref 3.5–5)
SODIUM SERPL-SCNC: 137 MMOL/L — SIGNIFICANT CHANGE UP (ref 135–146)

## 2024-11-11 PROCEDURE — 74018 RADEX ABDOMEN 1 VIEW: CPT | Mod: 26

## 2024-11-11 PROCEDURE — 99233 SBSQ HOSP IP/OBS HIGH 50: CPT

## 2024-11-11 RX ORDER — LOSARTAN POTASSIUM 100 MG/1
25 TABLET, FILM COATED ORAL DAILY
Refills: 0 | Status: DISCONTINUED | OUTPATIENT
Start: 2024-11-11 | End: 2024-11-21

## 2024-11-11 RX ORDER — CARVEDILOL 25 MG/1
25 TABLET, FILM COATED ORAL EVERY 12 HOURS
Refills: 0 | Status: DISCONTINUED | OUTPATIENT
Start: 2024-11-11 | End: 2024-11-14

## 2024-11-11 RX ORDER — MEMANTINE HYDROCHLORIDE 14 MG/1
5 CAPSULE, EXTENDED RELEASE ORAL DAILY
Refills: 0 | Status: DISCONTINUED | OUTPATIENT
Start: 2024-11-11 | End: 2024-11-21

## 2024-11-11 RX ORDER — AMLODIPINE BESYLATE 10 MG/1
5 TABLET ORAL DAILY
Refills: 0 | Status: DISCONTINUED | OUTPATIENT
Start: 2024-11-11 | End: 2024-11-21

## 2024-11-11 RX ORDER — FUROSEMIDE 40 MG/1
20 TABLET ORAL DAILY
Refills: 0 | Status: DISCONTINUED | OUTPATIENT
Start: 2024-11-11 | End: 2024-11-13

## 2024-11-11 RX ADMIN — POTASSIUM CHLORIDE 50 MILLIEQUIVALENT(S): 600 TABLET, EXTENDED RELEASE ORAL at 00:07

## 2024-11-11 RX ADMIN — CARVEDILOL 25 MILLIGRAM(S): 25 TABLET, FILM COATED ORAL at 12:00

## 2024-11-11 RX ADMIN — Medication 2 TABLET(S): at 12:49

## 2024-11-11 RX ADMIN — ENOXAPARIN SODIUM 40 MILLIGRAM(S): 30 INJECTION SUBCUTANEOUS at 06:28

## 2024-11-11 RX ADMIN — CHLORHEXIDINE GLUCONATE 1 APPLICATION(S): 1.2 RINSE ORAL at 07:58

## 2024-11-11 RX ADMIN — POTASSIUM CHLORIDE 50 MILLIEQUIVALENT(S): 600 TABLET, EXTENDED RELEASE ORAL at 02:04

## 2024-11-11 RX ADMIN — LOSARTAN POTASSIUM 25 MILLIGRAM(S): 100 TABLET, FILM COATED ORAL at 12:00

## 2024-11-11 RX ADMIN — MEMANTINE HYDROCHLORIDE 5 MILLIGRAM(S): 14 CAPSULE, EXTENDED RELEASE ORAL at 12:49

## 2024-11-11 RX ADMIN — CARVEDILOL 25 MILLIGRAM(S): 25 TABLET, FILM COATED ORAL at 18:41

## 2024-11-11 NOTE — PROGRESS NOTE ADULT - ASSESSMENT
89 yo F with hx of HTN, HLD, CHB s/p DC PPM s/p BiV ICD upgrade, Chronic A.fib (not on AC due to recurrent fall) and HFpEF (G3DD) presents to ED for generalized abdominal pain, constipation and hypokalemia.     # Acute sigmoid volvulus vs. ileus      likely precipitated by Hypokalemia / Hypomagnesemia     K2.7 >> 2.9 >> 3.4   Mg 1.7  >> 2.0  - CT abdomen shows Diffuse distention of the sigmoid colon. Possible area of sigmoid colon twisting which may represent a sigmoid volvulus.  - s/p eval by GI and Surgery in ED  - s/p urgent sigmoidoscopy decompression   >repeat KUB shows improvement in dilatation.   - on rectal tube, 200 cc output past 24 hours  - serial abd exams, daily KUB   - c/w rectal tube   - GI follow up appreciated   - Incentive Spirometer   - surgery: no acute surgical intervention    #Acute Cystitis  - CT abdomen shows Urinary bladder inflammatory changes which can be seen with cystitis.   - UA positive, but no urinary frequency or dysuria  - f/u UCx  - hold abx for now    #Chronic A.fib - c/w home med.     #HTN/HLD - c/w home med.   #HFpEF - c/w home med.    DVT ppx: Hold eliquis   GI ppx: PPI    Diet: NPO for now  Activity: as tolerated.      Social: Pending to reach out to Nephew for GOC discussions given advanced age    Pending: Ileus / Hypokalemia /PT/urine cx     Dispo: STR when ready

## 2024-11-11 NOTE — PROGRESS NOTE ADULT - SUBJECTIVE AND OBJECTIVE BOX
Gastroenterology progress note:     Patient is a 89y old  Female who presents with a chief complaint of low potassium and abd pain, constipation (10 Nov 2024 16:36)       Admitted on: 11-07-24    We are following the patient for:       Interval History:    No acute events overnight. Bowel dilation worsening repeat KUB. On exam distended but soft no abdominal pain.   Rectal tube placed with gaseous flush and copious output of liquid stool.      PAST MEDICAL & SURGICAL HISTORY:  High cholesterol      HTN (hypertension)      Pacemaker      AICD (automatic cardioverter/defibrillator) present      S/P hip replacement  b/l      S/P knee replacement      S/P cataract surgery          MEDICATIONS  (STANDING):  amLODIPine   Tablet 5 milliGRAM(s) Oral daily  carvedilol 25 milliGRAM(s) Oral every 12 hours  chlorhexidine 2% Cloths 1 Application(s) Topical <User Schedule>  enoxaparin Injectable 40 milliGRAM(s) SubCutaneous every 24 hours  furosemide    Tablet 20 milliGRAM(s) Oral daily  lactated ringers 1000 milliLiter(s) (60 mL/Hr) IV Continuous <Continuous>  lactated ringers. 1000 milliLiter(s) (75 mL/Hr) IV Continuous <Continuous>  losartan 25 milliGRAM(s) Oral daily  memantine 5 milliGRAM(s) Oral daily  polyethylene glycol 3350 17 Gram(s) Oral every 12 hours  senna 2 Tablet(s) Oral daily    MEDICATIONS  (PRN):  ondansetron Injectable 4 milliGRAM(s) IV Push once PRN Nausea and/or Vomiting      Allergies  codeine (Unknown)  penicillin (Unknown)      Review of Systems:   Cardiovascular:  No Chest Pain, No Palpitations  Respiratory:  No Cough, No Dyspnea  Gastrointestinal:  As described in HPI  Skin:  No Skin Lesions, No Jaundice  Neuro:  No Syncope, No Dizziness    Physical Examination:  T(C): 37.1 (11-11-24 @ 04:00), Max: 37.1 (11-11-24 @ 04:00)  HR: 59 (11-11-24 @ 08:10) (59 - 60)  BP: 176/81 (11-11-24 @ 08:10) (146/69 - 178/78)  RR: 18 (11-11-24 @ 08:10) (18 - 18)  SpO2: 98% (11-11-24 @ 08:10) (96% - 98%)      11-10-24 @ 07:01  -  11-11-24 @ 07:00  --------------------------------------------------------  IN: 650 mL / OUT: 1250 mL / NET: -600 mL        GENERAL: AAOx3, no acute distress.  HEAD:  Atraumatic, Normocephalic  EYES: conjunctiva and sclera clear  NECK: Supple, no JVD or thyromegaly  CHEST/LUNG: Clear to auscultation bilaterally; No wheeze, rhonchi, or rales  HEART: Regular rate and rhythm; normal S1, S2, No murmurs.  ABDOMEN: Soft, nontender, nondistended; Bowel sounds present  NEUROLOGY: No asterixis or tremor.   SKIN: Intact, no jaundice     Data:                        10.6   10.13 )-----------( 249      ( 10 Nov 2024 19:00 )             32.1     Hgb trend:  10.6  11-10-24 @ 19:00  10.4  11-09-24 @ 08:14        11-11    137  |  102  |  7[L]  ----------------------------<  99  3.8   |  26  |  0.6[L]    Ca    10.1      11 Nov 2024 06:59    TPro  5.5[L]  /  Alb  3.0[L]  /  TBili  0.6  /  DBili  x   /  AST  12  /  ALT  <5  /  AlkPhos  86  11-10    Liver panel trend:  TBili 0.6   /   AST 12   /   ALT <5   /   AlkP 86   /   Tptn 5.5   /   Alb 3.0    /   DBili --      11-10  TBili 0.6   /   AST 14   /   ALT <5   /   AlkP 77   /   Tptn 5.6   /   Alb 3.0    /   DBili --      11-09  TBili 0.6   /   AST 14   /   ALT 6   /   AlkP 72   /   Tptn 5.8   /   Alb 3.1    /   DBili --      11-07          Culture - Urine (collected 08 Nov 2024 22:59)  Source: Clean Catch Clean Catch (Midstream)  Final Report (10 Nov 2024 12:19):    No growth         Radiology:

## 2024-11-11 NOTE — CHART NOTE - NSCHARTNOTEFT_GEN_A_CORE
s/p EGD    Unremarkable EGD. No evidence of bleed.   Will plan for inpatient colonoscopy.   Recommend heparin ggt until procedure and to hold Warfarin for now if ok with cardio  Clear liquid diet  Patient requesting ENT evaluation  Will follow

## 2024-11-11 NOTE — PROGRESS NOTE ADULT - ASSESSMENT
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  She was found to have hypokalemia and evidence of questionable twisting at the sigmoid suggestive of volvulus with moderate gaseous distention of the colon.     #Colon distention s/p flex sig and rectal tube placement on 11/7   #Recent moderate colonic ileus in setting of recent hip replacement and hypokalemia  #Chronic constipation  - s/p Flexible Sigmoidoscopy (11/7) : the scope was traversed through the anus up to the sigmoid colon. No twisting was noted. The colon was noted to be markedly distended with stool residues throughout. Aggressive suctioning was performed and successful decompression was achieved. At the end of the exam, the abdomen was very soft and non distended  - rectal tube output 400 cc   - abdominal exam: soft non tender, mildly distended, pt comfortable  - surgery following   - 11/9: KUB shows improving distention, rectal tube output ~200cc    11:11: Bowel dilation worsening repeat KUB. On exam distended but soft no abdominal pain.   Rectal tube placed with gaseous flush and copious output of liquid stool.    Plan:  - Please repeat KUB tonight   - Monitor bowel movements/mointor output in rectal tube and avoid constipation: recommend bowel regimen w/ miralax TID and senna qHS to achieve 1 soft daily BM, tap water enemas q12  - Maintain rectal tube  - cw clear liquids for now  - supportive care w/ IV fluids per primary team  - Monitor electrolytes closely and replete as needed to keep K >4 and Mg >2  - Recommend serial abdominal exams  - Recommend daily KUBs  - Recommend PT/OT, encourage ambulation, Freuqent repositioning, and incentive spirometry  - we will follow

## 2024-11-12 LAB
ANION GAP SERPL CALC-SCNC: 9 MMOL/L — SIGNIFICANT CHANGE UP (ref 7–14)
BASOPHILS # BLD AUTO: 0.04 K/UL — SIGNIFICANT CHANGE UP (ref 0–0.2)
BASOPHILS NFR BLD AUTO: 0.5 % — SIGNIFICANT CHANGE UP (ref 0–1)
BUN SERPL-MCNC: 7 MG/DL — LOW (ref 10–20)
CALCIUM SERPL-MCNC: 10.1 MG/DL — SIGNIFICANT CHANGE UP (ref 8.4–10.5)
CHLORIDE SERPL-SCNC: 103 MMOL/L — SIGNIFICANT CHANGE UP (ref 98–110)
CO2 SERPL-SCNC: 27 MMOL/L — SIGNIFICANT CHANGE UP (ref 17–32)
CREAT SERPL-MCNC: 0.5 MG/DL — LOW (ref 0.7–1.5)
EGFR: 87 ML/MIN/1.73M2 — SIGNIFICANT CHANGE UP
EOSINOPHIL # BLD AUTO: 0.4 K/UL — SIGNIFICANT CHANGE UP (ref 0–0.7)
EOSINOPHIL NFR BLD AUTO: 5.2 % — SIGNIFICANT CHANGE UP (ref 0–8)
GLUCOSE SERPL-MCNC: 99 MG/DL — SIGNIFICANT CHANGE UP (ref 70–99)
HCT VFR BLD CALC: 29.2 % — LOW (ref 37–47)
HGB BLD-MCNC: 9.8 G/DL — LOW (ref 12–16)
IMM GRANULOCYTES NFR BLD AUTO: 0.8 % — HIGH (ref 0.1–0.3)
LYMPHOCYTES # BLD AUTO: 0.99 K/UL — LOW (ref 1.2–3.4)
LYMPHOCYTES # BLD AUTO: 12.8 % — LOW (ref 20.5–51.1)
MAGNESIUM SERPL-MCNC: 1.8 MG/DL — SIGNIFICANT CHANGE UP (ref 1.8–2.4)
MCHC RBC-ENTMCNC: 30.4 PG — SIGNIFICANT CHANGE UP (ref 27–31)
MCHC RBC-ENTMCNC: 33.6 G/DL — SIGNIFICANT CHANGE UP (ref 32–37)
MCV RBC AUTO: 90.7 FL — SIGNIFICANT CHANGE UP (ref 81–99)
MONOCYTES # BLD AUTO: 0.72 K/UL — HIGH (ref 0.1–0.6)
MONOCYTES NFR BLD AUTO: 9.3 % — SIGNIFICANT CHANGE UP (ref 1.7–9.3)
NEUTROPHILS # BLD AUTO: 5.5 K/UL — SIGNIFICANT CHANGE UP (ref 1.4–6.5)
NEUTROPHILS NFR BLD AUTO: 71.4 % — SIGNIFICANT CHANGE UP (ref 42.2–75.2)
NRBC # BLD: 0 /100 WBCS — SIGNIFICANT CHANGE UP (ref 0–0)
PLATELET # BLD AUTO: 238 K/UL — SIGNIFICANT CHANGE UP (ref 130–400)
PMV BLD: 10.7 FL — HIGH (ref 7.4–10.4)
POTASSIUM SERPL-MCNC: 3.2 MMOL/L — LOW (ref 3.5–5)
POTASSIUM SERPL-SCNC: 3.2 MMOL/L — LOW (ref 3.5–5)
RBC # BLD: 3.22 M/UL — LOW (ref 4.2–5.4)
RBC # FLD: 13.7 % — SIGNIFICANT CHANGE UP (ref 11.5–14.5)
SODIUM SERPL-SCNC: 139 MMOL/L — SIGNIFICANT CHANGE UP (ref 135–146)
WBC # BLD: 7.71 K/UL — SIGNIFICANT CHANGE UP (ref 4.8–10.8)
WBC # FLD AUTO: 7.71 K/UL — SIGNIFICANT CHANGE UP (ref 4.8–10.8)

## 2024-11-12 PROCEDURE — 99233 SBSQ HOSP IP/OBS HIGH 50: CPT

## 2024-11-12 PROCEDURE — 74018 RADEX ABDOMEN 1 VIEW: CPT | Mod: 26

## 2024-11-12 RX ORDER — POTASSIUM CHLORIDE 600 MG/1
20 TABLET, EXTENDED RELEASE ORAL ONCE
Refills: 0 | Status: COMPLETED | OUTPATIENT
Start: 2024-11-12 | End: 2024-11-12

## 2024-11-12 RX ORDER — POTASSIUM CHLORIDE 600 MG/1
40 TABLET, EXTENDED RELEASE ORAL
Refills: 0 | Status: COMPLETED | OUTPATIENT
Start: 2024-11-12 | End: 2024-11-12

## 2024-11-12 RX ADMIN — AMLODIPINE BESYLATE 5 MILLIGRAM(S): 10 TABLET ORAL at 06:42

## 2024-11-12 RX ADMIN — FUROSEMIDE 20 MILLIGRAM(S): 40 TABLET ORAL at 06:36

## 2024-11-12 RX ADMIN — MEMANTINE HYDROCHLORIDE 5 MILLIGRAM(S): 14 CAPSULE, EXTENDED RELEASE ORAL at 12:01

## 2024-11-12 RX ADMIN — POLYETHYLENE GLYCOL 3350 17 GRAM(S): 17 POWDER, FOR SOLUTION ORAL at 06:36

## 2024-11-12 RX ADMIN — CARVEDILOL 25 MILLIGRAM(S): 25 TABLET, FILM COATED ORAL at 06:36

## 2024-11-12 RX ADMIN — LOSARTAN POTASSIUM 25 MILLIGRAM(S): 100 TABLET, FILM COATED ORAL at 06:35

## 2024-11-12 RX ADMIN — POTASSIUM CHLORIDE 50 MILLIEQUIVALENT(S): 600 TABLET, EXTENDED RELEASE ORAL at 09:16

## 2024-11-12 RX ADMIN — POLYETHYLENE GLYCOL 3350 17 GRAM(S): 17 POWDER, FOR SOLUTION ORAL at 18:38

## 2024-11-12 RX ADMIN — POTASSIUM CHLORIDE 40 MILLIEQUIVALENT(S): 600 TABLET, EXTENDED RELEASE ORAL at 09:51

## 2024-11-12 RX ADMIN — POTASSIUM CHLORIDE 40 MILLIEQUIVALENT(S): 600 TABLET, EXTENDED RELEASE ORAL at 18:37

## 2024-11-12 RX ADMIN — Medication 2 TABLET(S): at 12:01

## 2024-11-12 RX ADMIN — ENOXAPARIN SODIUM 40 MILLIGRAM(S): 30 INJECTION SUBCUTANEOUS at 06:36

## 2024-11-12 RX ADMIN — CHLORHEXIDINE GLUCONATE 1 APPLICATION(S): 1.2 RINSE ORAL at 06:43

## 2024-11-12 NOTE — PROGRESS NOTE ADULT - ASSESSMENT
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  She was found to have hypokalemia and evidence of questionable twisting at the sigmoid suggestive of volvulus with moderate gaseous distention of the colon.     #Colon distention s/p flex sig and rectal tube placement on 11/7   #Recent moderate colonic ileus in setting of recent hip replacement and hypokalemia  #Chronic constipation  - s/p Flexible Sigmoidoscopy (11/7) : the scope was traversed through the anus up to the sigmoid colon. No twisting was noted. The colon was noted to be markedly distended with stool residues throughout. Aggressive suctioning was performed and successful decompression was achieved. At the end of the exam, the abdomen was very soft and non distended  - rectal tube output 400 cc   - abdominal exam: soft non tender, mildly distended, pt comfortable  - surgery following   - 11/9: KUB shows improving distention, rectal tube output ~200cc    11:11: Bowel dilation worsening repeat KUB. On exam distended but soft no abdominal pain.   Rectal tube placed with gaseous flush and copious output of liquid stool.  11/12: S/p rectal tube yesterday. KUB much improved, abd soft less distended. 300cc output documented    Plan:  - Advance to full liquid diet and then advance diet as tolerated   - Maintain rectal tube for now  - Monitor bowel movements/monitor output in rectal tube and avoid constipation: recommend bowel regimen w/ miralax TID and senna qHS to achieve 1 soft daily BM, tap water enemas q12  - supportive care w/ IV fluids per primary team  - Monitor electrolytes closely and replete as needed to keep K >4 and Mg >2  - Recommend PT/OT, encourage ambulation, Frequent repositioning, and incentive spirometry

## 2024-11-12 NOTE — PROGRESS NOTE ADULT - SUBJECTIVE AND OBJECTIVE BOX
Gastroenterology progress note:     Patient is a 89y old  Female who presents with a chief complaint of low potassium and abd pain, constipation (11 Nov 2024 17:22)       Admitted on: 11-07-24    We are following the patient for: large bowel distention       Interval History:    No acute events overnight. S/p rectal tube yesterday. KUB much improved, abd soft less distended. 300cc output documented      PAST MEDICAL & SURGICAL HISTORY:  High cholesterol      HTN (hypertension)      Pacemaker      AICD (automatic cardioverter/defibrillator) present      S/P hip replacement  b/l      S/P knee replacement      S/P cataract surgery          MEDICATIONS  (STANDING):  amLODIPine   Tablet 5 milliGRAM(s) Oral daily  carvedilol 25 milliGRAM(s) Oral every 12 hours  chlorhexidine 2% Cloths 1 Application(s) Topical <User Schedule>  enoxaparin Injectable 40 milliGRAM(s) SubCutaneous every 24 hours  furosemide    Tablet 20 milliGRAM(s) Oral daily  lactated ringers 1000 milliLiter(s) (60 mL/Hr) IV Continuous <Continuous>  lactated ringers. 1000 milliLiter(s) (75 mL/Hr) IV Continuous <Continuous>  losartan 25 milliGRAM(s) Oral daily  memantine 5 milliGRAM(s) Oral daily  polyethylene glycol 3350 17 Gram(s) Oral every 12 hours  potassium chloride   Powder 40 milliEquivalent(s) Oral two times a day  senna 2 Tablet(s) Oral daily    MEDICATIONS  (PRN):  ondansetron Injectable 4 milliGRAM(s) IV Push once PRN Nausea and/or Vomiting      Allergies  codeine (Unknown)  penicillin (Unknown)      Review of Systems:   Cardiovascular:  No Chest Pain, No Palpitations  Respiratory:  No Cough, No Dyspnea  Gastrointestinal:  As described in HPI  Skin:  No Skin Lesions, No Jaundice  Neuro:  No Syncope, No Dizziness    Physical Examination:  T(C): 36.6 (11-12-24 @ 05:44), Max: 37 (11-11-24 @ 20:06)  HR: 63 (11-12-24 @ 05:44) (60 - 76)  BP: 179/73 (11-12-24 @ 05:44) (163/75 - 179/73)  RR: 18 (11-12-24 @ 05:44) (18 - 18)  SpO2: 91% (11-12-24 @ 05:44) (91% - 95%)      11-11-24 @ 07:01  -  11-12-24 @ 07:00  --------------------------------------------------------  IN: 0 mL / OUT: 2250 mL / NET: -2250 mL        GENERAL: AAOx3, no acute distress.  HEAD:  Atraumatic, Normocephalic  EYES: conjunctiva and sclera clear  NECK: Supple, no JVD or thyromegaly  CHEST/LUNG: Clear to auscultation bilaterally; No wheeze, rhonchi, or rales  HEART: Regular rate and rhythm; normal S1, S2, No murmurs.  ABDOMEN: Soft, nontender, nondistended; Bowel sounds present  NEUROLOGY: No asterixis or tremor.   SKIN: Intact, no jaundice     Data:                        9.8    7.71  )-----------( 238      ( 12 Nov 2024 06:15 )             29.2     Hgb trend:  9.8  11-12-24 @ 06:15  10.6  11-10-24 @ 19:00        11-12    139  |  103  |  7[L]  ----------------------------<  99  3.2[L]   |  27  |  0.5[L]    Ca    10.1      12 Nov 2024 06:15    TPro  5.5[L]  /  Alb  3.0[L]  /  TBili  0.6  /  DBili  x   /  AST  12  /  ALT  <5  /  AlkPhos  86  11-10    Liver panel trend:  TBili 0.6   /   AST 12   /   ALT <5   /   AlkP 86   /   Tptn 5.5   /   Alb 3.0    /   DBili --      11-10  TBili 0.6   /   AST 14   /   ALT <5   /   AlkP 77   /   Tptn 5.6   /   Alb 3.0    /   DBili --      11-09  TBili 0.6   /   AST 14   /   ALT 6   /   AlkP 72   /   Tptn 5.8   /   Alb 3.1    /   DBili --      11-07             Radiology:

## 2024-11-12 NOTE — PROGRESS NOTE ADULT - SUBJECTIVE AND OBJECTIVE BOX
24H events:    Patient is a 89y old Female who presents with a chief complaint of low potassium and abd pain, constipation (12 Nov 2024 09:18)    Primary diagnosis of Hypokalemia       Today is hospital day 5d.      PAST MEDICAL & SURGICAL HISTORY  High cholesterol    HTN (hypertension)    Pacemaker    AICD (automatic cardioverter/defibrillator) present    S/P hip replacement  b/l    S/P knee replacement    S/P cataract surgery      SOCIAL HISTORY:  Negative for smoking/alcohol/drug use.     ALLERGIES:  codeine (Unknown)  penicillin (Unknown)    MEDICATIONS:  STANDING MEDICATIONS  amLODIPine   Tablet 5 milliGRAM(s) Oral daily  carvedilol 25 milliGRAM(s) Oral every 12 hours  chlorhexidine 2% Cloths 1 Application(s) Topical <User Schedule>  enoxaparin Injectable 40 milliGRAM(s) SubCutaneous every 24 hours  furosemide    Tablet 20 milliGRAM(s) Oral daily  lactated ringers 1000 milliLiter(s) IV Continuous <Continuous>  lactated ringers. 1000 milliLiter(s) IV Continuous <Continuous>  losartan 25 milliGRAM(s) Oral daily  memantine 5 milliGRAM(s) Oral daily  polyethylene glycol 3350 17 Gram(s) Oral every 12 hours  potassium chloride   Powder 40 milliEquivalent(s) Oral two times a day  senna 2 Tablet(s) Oral daily    PRN MEDICATIONS  ondansetron Injectable 4 milliGRAM(s) IV Push once PRN    VITALS:   T(F): 97.9  HR: 63  BP: 179/73  RR: 18  SpO2: 91%    LABS:                        9.8    7.71  )-----------( 238      ( 12 Nov 2024 06:15 )             29.2     11-12    139  |  103  |  7[L]  ----------------------------<  99  3.2[L]   |  27  |  0.5[L]    Ca    10.1      12 Nov 2024 06:15  Mg     1.8     11-12    TPro  5.5[L]  /  Alb  3.0[L]  /  TBili  0.6  /  DBili  x   /  AST  12  /  ALT  <5  /  AlkPhos  86  11-10      Urinalysis Basic - ( 12 Nov 2024 06:15 )    Color: x / Appearance: x / SG: x / pH: x  Gluc: 99 mg/dL / Ketone: x  / Bili: x / Urobili: x   Blood: x / Protein: x / Nitrite: x   Leuk Esterase: x / RBC: x / WBC x   Sq Epi: x / Non Sq Epi: x / Bacteria: x                RADIOLOGY:    PHYSICAL EXAM:  GENERAL: NAD  NECK: Supple  NERVOUS SYSTEM:  Alert & Oriented X2-3  CHEST/LUNG: Clear to auscultation bilaterally  HEART: Regular rate and rhythm  ABDOMEN: Soft, Nontender  EXTREMITIES:  + Peripheral Pulses

## 2024-11-12 NOTE — PHYSICAL THERAPY INITIAL EVALUATION ADULT - SPECIFY REASON(S)
Chart reviewed. Pt. currently without activity orders. PT evaluation on hold pending activity orders as appropriate. Will follow.
11:38. As per Dr. Fields hold PT today 2* +rectal tube, PT to f/u when rectal tube will be discontinued anticipating tomorrow. Will f/u as appropriate.
8:56. Hold PT pending OOB activity orders. Dr. Fields notified via Teams. Will f/u when appropriate.

## 2024-11-12 NOTE — PROGRESS NOTE ADULT - SUBJECTIVE AND OBJECTIVE BOX
Hospital Day:  5 days     Subjective:    Patient is a 89y old  Female who presents with a chief complaint of Hypokalemia   (10 Nov 2024 10:16)    This morning patient is lying in bed comfortably. he reports no new complaints, including SOB, chest pain, abdominal pain, nausea, vomiting, dysuria, constipation, or diarrhea.    Past Medical Hx:   High cholesterol    HTN (hypertension)    Pacemaker    AICD (automatic cardioverter/defibrillator) present      Past Sx:    S/P hip replacement    S/P knee replacement    S/P cataract surgery      Allergies:  codeine (Unknown)  penicillin (Unknown)    Current Meds:     MEDICATIONS  (STANDING):  amLODIPine   Tablet 5 milliGRAM(s) Oral daily  carvedilol 25 milliGRAM(s) Oral every 12 hours  chlorhexidine 2% Cloths 1 Application(s) Topical <User Schedule>  enoxaparin Injectable 40 milliGRAM(s) SubCutaneous every 24 hours  furosemide    Tablet 20 milliGRAM(s) Oral daily  lactated ringers 1000 milliLiter(s) (60 mL/Hr) IV Continuous <Continuous>  lactated ringers. 1000 milliLiter(s) (75 mL/Hr) IV Continuous <Continuous>  losartan 25 milliGRAM(s) Oral daily  memantine 5 milliGRAM(s) Oral daily  polyethylene glycol 3350 17 Gram(s) Oral every 12 hours  senna 2 Tablet(s) Oral daily    MEDICATIONS  (PRN):  ondansetron Injectable 4 milliGRAM(s) IV Push once PRN Nausea and/or Vomiting      HOME MEDICATIONS:  amLODIPine 2.5 mg oral tablet: 1 orally  aspirin 81 mg oral tablet, chewable: 1 tab(s) orally once a day  carvedilol 25 mg oral tablet: 1 tab(s) orally 2 times a day  furosemide 20 mg oral tablet: 1 orally  Linzess 145 mcg oral capsule: 1 orally  losartan 25 mg oral tablet: 1 orally      Vital Signs:     T(C): 36.8 (12 Nov 2024 12:42), Max: 37 (11 Nov 2024 20:06)  T(F): 98.2 (12 Nov 2024 12:42), Max: 98.6 (11 Nov 2024 20:06)  HR: 59 (12 Nov 2024 12:42) (59 - 63)  BP: 174/73 (12 Nov 2024 12:42) (163/75 - 179/73)  BP(mean): 107 (12 Nov 2024 12:42) (107 - 107)  RR: 18 (12 Nov 2024 12:42) (18 - 18)  SpO2: 94% (12 Nov 2024 12:42) (91% - 95%)    Parameters below as of 12 Nov 2024 12:42  Patient On (Oxygen Delivery Method): room air      Physical Exam:   GENERAL: NAD  HEENT: NCAT  CHEST/LUNG: CTAB  HEART: Regular rate and rhythm; s1 s2 appreciated, No murmurs, rubs, or gallops  ABDOMEN: Soft, Nontender; Bowel sounds present  EXTREMITIES: No LE edema b/l  SKIN: no rashes, no new lesions  NERVOUS SYSTEM:  Alert & Oriented       Labs:                         9.8    7.71  )-----------( 238      ( 12 Nov 2024 06:15 )             29.2     11-12    139  |  103  |  7[L]  ----------------------------<  99  3.2[L]   |  27  |  0.5[L]    Ca    10.1      12 Nov 2024 06:15  Mg     1.8     11-12    TPro  5.5[L]  /  Alb  3.0[L]  /  TBili  0.6  /  DBili  x   /  AST  12  /  ALT  <5  /  AlkPhos  86  11-10                        10.6   10.13 )-----------( 249      ( 10 Nov 2024 19:00 )             32.1     11-11    137  |  102  |  7[L]  ----------------------------<  99  3.8   |  26  |  0.6[L]    Ca    10.1      11 Nov 2024 06:59    TPro  5.5[L]  /  Alb  3.0[L]  /  TBili  0.6  /  DBili  x   /  AST  12  /  ALT  <5  /  AlkPhos  86  11-10                        10.4   8.95  )-----------( 294      ( 09 Nov 2024 08:14 )             32.5     09 Nov 2024 08:14    143    |  107    |  9      ----------------------------<  96     3.4     |  27     |  0.7      Ca    10.2       09 Nov 2024 08:14    Mg     2.0       09 Nov 2024 08:14    TPro  5.6    /  Alb  3.0    /  TBili  0.6    /  DBili  x      /  AST  14     /  ALT  <5     /  AlkPhos  77     09 Nov 2024 08:14    Urinalysis Basic - ( 09 Nov 2024 08:14 )    Color: x / Appearance: x / SG: x / pH: x  Gluc: 96 mg/dL / Ketone: x  / Bili: x / Urobili: x   Blood: x / Protein: x / Nitrite: x   Leuk Esterase: x / RBC: x / WBC x   Sq Epi: x / Non Sq Epi: x / Bacteria: x     Hospital Day:  6 days     Subjective:    Patient is a 89y old  Female who presents with a chief complaint of Hypokalemia   (10 Nov 2024 10:16)    This morning patient is lying in bed comfortably. he reports no new complaints, including SOB, chest pain, abdominal pain, nausea, vomiting, dysuria, constipation, or diarrhea.    Past Medical Hx:   High cholesterol    HTN (hypertension)    Pacemaker    AICD (automatic cardioverter/defibrillator) present      Past Sx:    S/P hip replacement    S/P knee replacement    S/P cataract surgery      Allergies:  codeine (Unknown)  penicillin (Unknown)    Current Meds:     MEDICATIONS  (STANDING):  amLODIPine   Tablet 5 milliGRAM(s) Oral daily  carvedilol 25 milliGRAM(s) Oral every 12 hours  chlorhexidine 2% Cloths 1 Application(s) Topical <User Schedule>  enoxaparin Injectable 40 milliGRAM(s) SubCutaneous every 24 hours  furosemide    Tablet 20 milliGRAM(s) Oral daily  lactated ringers 1000 milliLiter(s) (60 mL/Hr) IV Continuous <Continuous>  lactated ringers. 1000 milliLiter(s) (75 mL/Hr) IV Continuous <Continuous>  losartan 25 milliGRAM(s) Oral daily  memantine 5 milliGRAM(s) Oral daily  polyethylene glycol 3350 17 Gram(s) Oral every 12 hours  potassium chloride   Powder 40 milliEquivalent(s) Oral two times a day  senna 2 Tablet(s) Oral daily    MEDICATIONS  (PRN):  ondansetron Injectable 4 milliGRAM(s) IV Push once PRN Nausea and/or Vomiting        HOME MEDICATIONS:  amLODIPine 2.5 mg oral tablet: 1 orally  aspirin 81 mg oral tablet, chewable: 1 tab(s) orally once a day  carvedilol 25 mg oral tablet: 1 tab(s) orally 2 times a day  furosemide 20 mg oral tablet: 1 orally  Linzess 145 mcg oral capsule: 1 orally  losartan 25 mg oral tablet: 1 orally      Vital Signs:     T(C): 36.8 (12 Nov 2024 12:42), Max: 37 (11 Nov 2024 20:06)  T(F): 98.2 (12 Nov 2024 12:42), Max: 98.6 (11 Nov 2024 20:06)  HR: 59 (12 Nov 2024 12:42) (59 - 63)  BP: 174/73 (12 Nov 2024 12:42) (163/75 - 179/73)  BP(mean): 107 (12 Nov 2024 12:42) (107 - 107)  RR: 18 (12 Nov 2024 12:42) (18 - 18)  SpO2: 94% (12 Nov 2024 12:42) (91% - 95%)    Parameters below as of 12 Nov 2024 12:42  Patient On (Oxygen Delivery Method): room air      Physical Exam:   GENERAL: NAD  HEENT: NCAT  CHEST/LUNG: CTAB  HEART: Regular rate and rhythm; s1 s2 appreciated, No murmurs, rubs, or gallops  ABDOMEN: Soft, Nontender; Bowel sounds present  EXTREMITIES: No LE edema b/l  SKIN: no rashes, no new lesions  NERVOUS SYSTEM:  Alert & Oriented       Labs:                         9.8    7.71  )-----------( 238      ( 12 Nov 2024 06:15 )             29.2     11-12    139  |  103  |  7[L]  ----------------------------<  99  3.2[L]   |  27  |  0.5[L]    Ca    10.1      12 Nov 2024 06:15  Mg     1.8     11-12    TPro  5.5[L]  /  Alb  3.0[L]  /  TBili  0.6  /  DBili  x   /  AST  12  /  ALT  <5  /  AlkPhos  86  11-10                        10.6   10.13 )-----------( 249      ( 10 Nov 2024 19:00 )             32.1     11-11    137  |  102  |  7[L]  ----------------------------<  99  3.8   |  26  |  0.6[L]    Ca    10.1      11 Nov 2024 06:59    TPro  5.5[L]  /  Alb  3.0[L]  /  TBili  0.6  /  DBili  x   /  AST  12  /  ALT  <5  /  AlkPhos  86  11-10                        10.4   8.95  )-----------( 294      ( 09 Nov 2024 08:14 )             32.5     09 Nov 2024 08:14    143    |  107    |  9      ----------------------------<  96     3.4     |  27     |  0.7      Ca    10.2       09 Nov 2024 08:14    Mg     2.0       09 Nov 2024 08:14    TPro  5.6    /  Alb  3.0    /  TBili  0.6    /  DBili  x      /  AST  14     /  ALT  <5     /  AlkPhos  77     09 Nov 2024 08:14    Urinalysis Basic - ( 09 Nov 2024 08:14 )    Color: x / Appearance: x / SG: x / pH: x  Gluc: 96 mg/dL / Ketone: x  / Bili: x / Urobili: x   Blood: x / Protein: x / Nitrite: x   Leuk Esterase: x / RBC: x / WBC x   Sq Epi: x / Non Sq Epi: x / Bacteria: x

## 2024-11-12 NOTE — PROGRESS NOTE ADULT - ASSESSMENT
89 yo F with hx of HTN, HLD, CHB s/p DC PPM s/p BiV ICD upgrade, Chronic A.fib (not on AC due to recurrent fall) and HFpEF (G3DD) presents to ED for generalized abdominal pain, constipation and hypokalemia.     # Acute sigmoid volvulus   - CT abdomen shows Diffuse distention of the sigmoid colon. Possible area of sigmoid colon twisting which may represent a sigmoid volvulus.  - s/p eval by GI and Surgery in ED  - s/p urgent sigmoidoscopy decompression   >repeat KUB shows improvement in dilatation after rectal tube placement  - on rectal tube, 300 cc output past 24 hours  - serial abd exams, daily KUB   - c/w rectal tube   - GI follow up appreciated   - Incentive Spirometer   - surgery: no acute surgical intervention    #Acute Cystitis  - CT abdomen shows Urinary bladder inflammatory changes which can be seen with cystitis.   - UA positive, but no urinary frequency or dysuria  - UCx negative    #Chronic A.fib - c/w home med.     #HTN/HLD - c/w home med.   #HFpEF - c/w home med.    DVT ppx: Hold eliquis   GI ppx: PPI    Diet: Advanced to full liquid  Activity: as tolerated.

## 2024-11-12 NOTE — PROGRESS NOTE ADULT - ASSESSMENT
89 yo F with hx of HTN, HLD, CHB s/p DC PPM s/p BiV ICD upgrade, Chronic A.fib (not on AC due to recurrent fall) and HFpEF (G3DD) presents to ED for generalized abdominal pain, constipation and hypokalemia.     # Acute Sigmoid Volvulus vs. Ileus       likely precipitated by Hypokalemia / Hypomagnesemia      K2.7 >> 2.9 >> 3.4   Mg 1.7  >> 2.0  - CT abdomen shows Diffuse distention of the sigmoid colon. Possible area of sigmoid colon twisting which may represent a sigmoid volvulus.  - s/p eval by GI and Surgery in ED  - s/p urgent sigmoidoscopy decompression   >repeat KUB shows improvement in dilatation.   - on rectal tube, 200 cc output past 24 hours  - serial abd exams, daily KUB   - c/w rectal tube per GI   - Advance diet to FULL LIQUID    - GI follow up appreciated   - Incentive Spirometer   - Surgery: no acute surgical intervention - monitor     #Acute Cystitis  - CT abdomen shows Urinary bladder inflammatory changes which can be seen with cystitis.   - UA positive, but no urinary frequency or dysuria  - ruled out     #Hypokalemia   - 3.2 level   - 20meq IV over 2hrs slow run   - 40meq po x2 4hrs apart  - f/u bmp today and Mg     #Chronic A.fib   - resume eliquis   - rate controlled     #HTN  - c/w Amlodipine / Losartan     #HLD   - c/w atorvastatin    #HFimpEF   - AICD BiV   - c/w Losartan   - c/w Coreg     DVT ppx: on eliquis   GI ppx: PPI    Diet: advance diet to full liquid diet   Activity: as tolerate     Social: Pending to reach out to Nephew for GOC discussions given advanced age however clinically improving expecting discharge plan to STR     Pending: Ileus / Rectal Tube / Hypokalemia / PT eval / f/u K level was low supplementing   Dispo: STR when ready  89 yo F with hx of HTN, HLD, CHB s/p DC PPM s/p BiV ICD upgrade, Chronic A.fib (not on AC due to recurrent fall) and HFpEF (G3DD) presents to ED for generalized abdominal pain, constipation and hypokalemia.     # Acute Sigmoid Volvulus vs. Ileus       likely precipitated by Hypokalemia / Hypomagnesemia      K2.7 >> 2.9 >> 3.4   Mg 1.7  >> 2.0  - CT abdomen shows Diffuse distention of the sigmoid colon. Possible area of sigmoid colon twisting which may represent a sigmoid volvulus.  - s/p eval by GI and Surgery in ED  - s/p urgent sigmoidoscopy decompression   >repeat KUB shows improvement in dilatation.   - on rectal tube, 200 cc output past 24 hours  - serial abd exams, daily KUB   - c/w rectal tube per GI   - Advance diet to FULL LIQUID    - GI follow up appreciated   - Incentive Spirometer   - Surgery: no acute surgical intervention - monitor     #Acute Cystitis  - CT abdomen shows Urinary bladder inflammatory changes which can be seen with cystitis.   - UA positive, but no urinary frequency or dysuria  - ruled out     #Hypokalemia   - 3.2 level   - 20meq IV over 2hrs slow run   - 40meq po x2 4hrs apart  - f/u bmp today and Mg     #Chronic A.fib   - not on eliquis   - c/w ASA 81mg po daily   - rate controlled     #HTN  - c/w Amlodipine / Losartan     #HLD   - c/w atorvastatin    #HFimpEF   - AICD BiV   - c/w Losartan   - c/w Coreg     DVT ppx: Heparin 5K q12h  GI ppx: PPI    Diet: advance diet to full liquid diet   Activity: as tolerate     Social: Pending to reach out to Nephew for GOC discussions given advanced age however clinically improving expecting discharge plan to STR     Pending: Ileus / Rectal Tube / Hypokalemia / PT eval / f/u K level was low supplementing   Dispo: STR when ready

## 2024-11-13 LAB
ANION GAP SERPL CALC-SCNC: 8 MMOL/L — SIGNIFICANT CHANGE UP (ref 7–14)
BASOPHILS # BLD AUTO: 0.04 K/UL — SIGNIFICANT CHANGE UP (ref 0–0.2)
BASOPHILS NFR BLD AUTO: 0.5 % — SIGNIFICANT CHANGE UP (ref 0–1)
BUN SERPL-MCNC: 7 MG/DL — LOW (ref 10–20)
CALCIUM SERPL-MCNC: 10 MG/DL — SIGNIFICANT CHANGE UP (ref 8.4–10.5)
CHLORIDE SERPL-SCNC: 104 MMOL/L — SIGNIFICANT CHANGE UP (ref 98–110)
CO2 SERPL-SCNC: 28 MMOL/L — SIGNIFICANT CHANGE UP (ref 17–32)
CREAT SERPL-MCNC: 0.6 MG/DL — LOW (ref 0.7–1.5)
EGFR: 83 ML/MIN/1.73M2 — SIGNIFICANT CHANGE UP
EOSINOPHIL # BLD AUTO: 0.41 K/UL — SIGNIFICANT CHANGE UP (ref 0–0.7)
EOSINOPHIL NFR BLD AUTO: 5.2 % — SIGNIFICANT CHANGE UP (ref 0–8)
GLUCOSE SERPL-MCNC: 96 MG/DL — SIGNIFICANT CHANGE UP (ref 70–99)
HCT VFR BLD CALC: 30.7 % — LOW (ref 37–47)
HGB BLD-MCNC: 10 G/DL — LOW (ref 12–16)
IMM GRANULOCYTES NFR BLD AUTO: 0.4 % — HIGH (ref 0.1–0.3)
LYMPHOCYTES # BLD AUTO: 1.2 K/UL — SIGNIFICANT CHANGE UP (ref 1.2–3.4)
LYMPHOCYTES # BLD AUTO: 15.2 % — LOW (ref 20.5–51.1)
MCHC RBC-ENTMCNC: 29.6 PG — SIGNIFICANT CHANGE UP (ref 27–31)
MCHC RBC-ENTMCNC: 32.6 G/DL — SIGNIFICANT CHANGE UP (ref 32–37)
MCV RBC AUTO: 90.8 FL — SIGNIFICANT CHANGE UP (ref 81–99)
MONOCYTES # BLD AUTO: 0.66 K/UL — HIGH (ref 0.1–0.6)
MONOCYTES NFR BLD AUTO: 8.4 % — SIGNIFICANT CHANGE UP (ref 1.7–9.3)
NEUTROPHILS # BLD AUTO: 5.53 K/UL — SIGNIFICANT CHANGE UP (ref 1.4–6.5)
NEUTROPHILS NFR BLD AUTO: 70.3 % — SIGNIFICANT CHANGE UP (ref 42.2–75.2)
NRBC # BLD: 0 /100 WBCS — SIGNIFICANT CHANGE UP (ref 0–0)
PLATELET # BLD AUTO: 256 K/UL — SIGNIFICANT CHANGE UP (ref 130–400)
PMV BLD: 11.2 FL — HIGH (ref 7.4–10.4)
POTASSIUM SERPL-MCNC: 3.3 MMOL/L — LOW (ref 3.5–5)
POTASSIUM SERPL-SCNC: 3.3 MMOL/L — LOW (ref 3.5–5)
RBC # BLD: 3.38 M/UL — LOW (ref 4.2–5.4)
RBC # FLD: 13.7 % — SIGNIFICANT CHANGE UP (ref 11.5–14.5)
SODIUM SERPL-SCNC: 140 MMOL/L — SIGNIFICANT CHANGE UP (ref 135–146)
WBC # BLD: 7.87 K/UL — SIGNIFICANT CHANGE UP (ref 4.8–10.8)
WBC # FLD AUTO: 7.87 K/UL — SIGNIFICANT CHANGE UP (ref 4.8–10.8)

## 2024-11-13 PROCEDURE — 74018 RADEX ABDOMEN 1 VIEW: CPT | Mod: 26

## 2024-11-13 PROCEDURE — 99232 SBSQ HOSP IP/OBS MODERATE 35: CPT

## 2024-11-13 PROCEDURE — 99233 SBSQ HOSP IP/OBS HIGH 50: CPT

## 2024-11-13 RX ORDER — POTASSIUM CHLORIDE 600 MG/1
40 TABLET, EXTENDED RELEASE ORAL ONCE
Refills: 0 | Status: DISCONTINUED | OUTPATIENT
Start: 2024-11-13 | End: 2024-11-13

## 2024-11-13 RX ORDER — POTASSIUM CHLORIDE 600 MG/1
20 TABLET, EXTENDED RELEASE ORAL
Refills: 0 | Status: COMPLETED | OUTPATIENT
Start: 2024-11-13 | End: 2024-11-13

## 2024-11-13 RX ADMIN — POTASSIUM CHLORIDE 50 MILLIEQUIVALENT(S): 600 TABLET, EXTENDED RELEASE ORAL at 14:11

## 2024-11-13 RX ADMIN — CHLORHEXIDINE GLUCONATE 1 APPLICATION(S): 1.2 RINSE ORAL at 06:52

## 2024-11-13 RX ADMIN — Medication 81 MILLIGRAM(S): at 11:28

## 2024-11-13 RX ADMIN — AMLODIPINE BESYLATE 5 MILLIGRAM(S): 10 TABLET ORAL at 06:52

## 2024-11-13 RX ADMIN — POTASSIUM CHLORIDE 50 MILLIEQUIVALENT(S): 600 TABLET, EXTENDED RELEASE ORAL at 10:30

## 2024-11-13 RX ADMIN — MEMANTINE HYDROCHLORIDE 5 MILLIGRAM(S): 14 CAPSULE, EXTENDED RELEASE ORAL at 11:28

## 2024-11-13 RX ADMIN — LOSARTAN POTASSIUM 25 MILLIGRAM(S): 100 TABLET, FILM COATED ORAL at 06:52

## 2024-11-13 RX ADMIN — Medication 2 TABLET(S): at 11:28

## 2024-11-13 RX ADMIN — Medication 25 GRAM(S): at 17:58

## 2024-11-13 RX ADMIN — CARVEDILOL 25 MILLIGRAM(S): 25 TABLET, FILM COATED ORAL at 06:52

## 2024-11-13 RX ADMIN — ENOXAPARIN SODIUM 40 MILLIGRAM(S): 30 INJECTION SUBCUTANEOUS at 06:52

## 2024-11-13 RX ADMIN — FUROSEMIDE 20 MILLIGRAM(S): 40 TABLET ORAL at 06:52

## 2024-11-13 RX ADMIN — POLYETHYLENE GLYCOL 3350 17 GRAM(S): 17 POWDER, FOR SOLUTION ORAL at 17:07

## 2024-11-13 RX ADMIN — POLYETHYLENE GLYCOL 3350 17 GRAM(S): 17 POWDER, FOR SOLUTION ORAL at 06:52

## 2024-11-13 NOTE — PROGRESS NOTE ADULT - SUBJECTIVE AND OBJECTIVE BOX
Patient is a 89y old  Female who presents with a chief complaint of low potassium and abd pain, constipation (12 Nov 2024 13:37)      OVERNIGHT EVENTS: feels fine,    SUBJECTIVE / INTERVAL HPI: Patient seen and examined at bedside.     VITAL SIGNS:  Vital Signs Last 24 Hrs  T(C): 36.7 (13 Nov 2024 05:12), Max: 36.7 (13 Nov 2024 05:12)  T(F): 98.1 (13 Nov 2024 05:12), Max: 98.1 (13 Nov 2024 05:12)  HR: 62 (13 Nov 2024 05:12) (57 - 62)  BP: 168/71 (13 Nov 2024 05:12) (141/60 - 168/71)  BP(mean): --  RR: 19 (13 Nov 2024 05:12) (19 - 19)  SpO2: 96% (13 Nov 2024 07:50) (92% - 96%)    Parameters below as of 13 Nov 2024 07:50  Patient On (Oxygen Delivery Method): room air        PHYSICAL EXAM:    General: old lady chronically looks ill   HEENT: NC/AT; PERRL, clear conjunctiva  Neck: supple  Cardiovascular: +S1/S2; RRR  Respiratory: CTA b/l; no W/R/R  Gastrointestinal: soft, non tender, mildly distended abd,; +BSx4  +ve rpesence of hernandez and rectal tube   Extremities: WWP; 2+ peripheral pulses; no edema   Neurological: AAOx3; no focal deficits    MEDICATIONS:  MEDICATIONS  (STANDING):  amLODIPine   Tablet 5 milliGRAM(s) Oral daily  aspirin  chewable 81 milliGRAM(s) Oral daily  carvedilol 25 milliGRAM(s) Oral every 12 hours  chlorhexidine 2% Cloths 1 Application(s) Topical <User Schedule>  enoxaparin Injectable 40 milliGRAM(s) SubCutaneous every 24 hours  losartan 25 milliGRAM(s) Oral daily  magnesium sulfate  IVPB 2 Gram(s) IV Intermittent once  memantine 5 milliGRAM(s) Oral daily  polyethylene glycol 3350 17 Gram(s) Oral every 12 hours  potassium chloride  20 mEq/100 mL IVPB 20 milliEquivalent(s) IV Intermittent every 2 hours  senna 2 Tablet(s) Oral daily    MEDICATIONS  (PRN):  ondansetron Injectable 4 milliGRAM(s) IV Push once PRN Nausea and/or Vomiting      ALLERGIES:  Allergies    codeine (Unknown)  penicillin (Unknown)    Intolerances        LABS:                        10.0   7.87  )-----------( 256      ( 13 Nov 2024 06:04 )             30.7     11-13    140  |  104  |  7[L]  ----------------------------<  96  3.3[L]   |  28  |  0.6[L]    Ca    10.0      13 Nov 2024 06:04  Mg     1.8     11-12        Urinalysis Basic - ( 13 Nov 2024 06:04 )    Color: x / Appearance: x / SG: x / pH: x  Gluc: 96 mg/dL / Ketone: x  / Bili: x / Urobili: x   Blood: x / Protein: x / Nitrite: x   Leuk Esterase: x / RBC: x / WBC x   Sq Epi: x / Non Sq Epi: x / Bacteria: x      CAPILLARY BLOOD GLUCOSE          RADIOLOGY & ADDITIONAL TESTS: Reviewed.    ASSESSMENT:    PLAN:

## 2024-11-13 NOTE — PROGRESS NOTE ADULT - SUBJECTIVE AND OBJECTIVE BOX
Gastroenterology progress note:     Patient is a 89y old  Female who presents with a chief complaint of low potassium and abd pain, constipation (13 Nov 2024 12:58)       Admitted on: 11-07-24    We are following the patient for:       Interval History:    No acute events overnight.   - Diet -   - last BM -   - Abdominal pain -       PAST MEDICAL & SURGICAL HISTORY:  High cholesterol      HTN (hypertension)      Pacemaker      AICD (automatic cardioverter/defibrillator) present      S/P hip replacement  b/l      S/P knee replacement      S/P cataract surgery          MEDICATIONS  (STANDING):  amLODIPine   Tablet 5 milliGRAM(s) Oral daily  aspirin  chewable 81 milliGRAM(s) Oral daily  carvedilol 25 milliGRAM(s) Oral every 12 hours  chlorhexidine 2% Cloths 1 Application(s) Topical <User Schedule>  enoxaparin Injectable 40 milliGRAM(s) SubCutaneous every 24 hours  losartan 25 milliGRAM(s) Oral daily  magnesium sulfate  IVPB 2 Gram(s) IV Intermittent once  memantine 5 milliGRAM(s) Oral daily  polyethylene glycol 3350 17 Gram(s) Oral every 12 hours  senna 2 Tablet(s) Oral daily    MEDICATIONS  (PRN):  ondansetron Injectable 4 milliGRAM(s) IV Push once PRN Nausea and/or Vomiting      Allergies  codeine (Unknown)  penicillin (Unknown)      Review of Systems:   Cardiovascular:  No Chest Pain, No Palpitations  Respiratory:  No Cough, No Dyspnea  Gastrointestinal:  As described in HPI  Skin:  No Skin Lesions, No Jaundice  Neuro:  No Syncope, No Dizziness    Physical Examination:  T(C): 37 (11-13-24 @ 13:27), Max: 37 (11-13-24 @ 13:27)  HR: 82 (11-13-24 @ 15:22) (57 - 82)  BP: 149/71 (11-13-24 @ 15:22) (141/60 - 171/79)  RR: 18 (11-13-24 @ 13:27) (18 - 19)  SpO2: 95% (11-13-24 @ 13:27) (92% - 96%)      11-12-24 @ 07:01  -  11-13-24 @ 07:00  --------------------------------------------------------  IN: 0 mL / OUT: 1700 mL / NET: -1700 mL        GENERAL: AAOx3, no acute distress.  HEAD:  Atraumatic, Normocephalic  EYES: conjunctiva and sclera clear  NECK: Supple, no JVD or thyromegaly  CHEST/LUNG: Clear to auscultation bilaterally; No wheeze, rhonchi, or rales  HEART: Regular rate and rhythm; normal S1, S2, No murmurs.  ABDOMEN: Soft, nontender, nondistended; Bowel sounds present  NEUROLOGY: No asterixis or tremor.   SKIN: Intact, no jaundice     Data:                        10.0   7.87  )-----------( 256      ( 13 Nov 2024 06:04 )             30.7     Hgb trend:  10.0  11-13-24 @ 06:04  9.8  11-12-24 @ 06:15  10.6  11-10-24 @ 19:00        11-13    140  |  104  |  7[L]  ----------------------------<  96  3.3[L]   |  28  |  0.6[L]    Ca    10.0      13 Nov 2024 06:04  Mg     1.8     11-12      Liver panel trend:  TBili 0.6   /   AST 12   /   ALT <5   /   AlkP 86   /   Tptn 5.5   /   Alb 3.0    /   DBili --      11-10  TBili 0.6   /   AST 14   /   ALT <5   /   AlkP 77   /   Tptn 5.6   /   Alb 3.0    /   DBili --      11-09  TBili 0.6   /   AST 14   /   ALT 6   /   AlkP 72   /   Tptn 5.8   /   Alb 3.1    /   DBili --      11-07             Radiology:       Gastroenterology progress note:     Patient is a 89y old  Female who presents with a chief complaint of low potassium and abd pain, constipation (13 Nov 2024 12:58)       Admitted on: 11-07-24    We are following the patient for: bowel distention       Interval History:    No acute events overnight. KUB improved. ABd exam imprved, soft, non distended. Having output in rectal tube. Tolerating liquid diet    PAST MEDICAL & SURGICAL HISTORY:  High cholesterol      HTN (hypertension)      Pacemaker      AICD (automatic cardioverter/defibrillator) present      S/P hip replacement  b/l      S/P knee replacement      S/P cataract surgery          MEDICATIONS  (STANDING):  amLODIPine   Tablet 5 milliGRAM(s) Oral daily  aspirin  chewable 81 milliGRAM(s) Oral daily  carvedilol 25 milliGRAM(s) Oral every 12 hours  chlorhexidine 2% Cloths 1 Application(s) Topical <User Schedule>  enoxaparin Injectable 40 milliGRAM(s) SubCutaneous every 24 hours  losartan 25 milliGRAM(s) Oral daily  magnesium sulfate  IVPB 2 Gram(s) IV Intermittent once  memantine 5 milliGRAM(s) Oral daily  polyethylene glycol 3350 17 Gram(s) Oral every 12 hours  senna 2 Tablet(s) Oral daily    MEDICATIONS  (PRN):  ondansetron Injectable 4 milliGRAM(s) IV Push once PRN Nausea and/or Vomiting      Allergies  codeine (Unknown)  penicillin (Unknown)      Review of Systems:   Cardiovascular:  No Chest Pain, No Palpitations  Respiratory:  No Cough, No Dyspnea  Gastrointestinal:  As described in HPI  Skin:  No Skin Lesions, No Jaundice  Neuro:  No Syncope, No Dizziness    Physical Examination:  T(C): 37 (11-13-24 @ 13:27), Max: 37 (11-13-24 @ 13:27)  HR: 82 (11-13-24 @ 15:22) (57 - 82)  BP: 149/71 (11-13-24 @ 15:22) (141/60 - 171/79)  RR: 18 (11-13-24 @ 13:27) (18 - 19)  SpO2: 95% (11-13-24 @ 13:27) (92% - 96%)      11-12-24 @ 07:01  -  11-13-24 @ 07:00  --------------------------------------------------------  IN: 0 mL / OUT: 1700 mL / NET: -1700 mL        GENERAL: AAOx3, no acute distress.  HEAD:  Atraumatic, Normocephalic  EYES: conjunctiva and sclera clear  NECK: Supple, no JVD or thyromegaly  CHEST/LUNG: Clear to auscultation bilaterally; No wheeze, rhonchi, or rales  HEART: Regular rate and rhythm; normal S1, S2, No murmurs.  ABDOMEN: Soft, nontender, nondistended; Bowel sounds present  NEUROLOGY: No asterixis or tremor.   SKIN: Intact, no jaundice     Data:                        10.0   7.87  )-----------( 256      ( 13 Nov 2024 06:04 )             30.7     Hgb trend:  10.0  11-13-24 @ 06:04  9.8  11-12-24 @ 06:15  10.6  11-10-24 @ 19:00        11-13    140  |  104  |  7[L]  ----------------------------<  96  3.3[L]   |  28  |  0.6[L]    Ca    10.0      13 Nov 2024 06:04  Mg     1.8     11-12      Liver panel trend:  TBili 0.6   /   AST 12   /   ALT <5   /   AlkP 86   /   Tptn 5.5   /   Alb 3.0    /   DBili --      11-10  TBili 0.6   /   AST 14   /   ALT <5   /   AlkP 77   /   Tptn 5.6   /   Alb 3.0    /   DBili --      11-09  TBili 0.6   /   AST 14   /   ALT 6   /   AlkP 72   /   Tptn 5.8   /   Alb 3.1    /   DBili --      11-07             Radiology:       Gastroenterology progress note:     Patient is a 89y old  Female who presents with a chief complaint of low potassium and abd pain, constipation (13 Nov 2024 12:58)       Admitted on: 11-07-24    We are following the patient for: bowel distention       Interval History:    No acute events overnight. KUB improved. ABd exam improved, soft, non distended. Having output in rectal tube. Tolerating liquid diet    PAST MEDICAL & SURGICAL HISTORY:  High cholesterol      HTN (hypertension)      Pacemaker      AICD (automatic cardioverter/defibrillator) present      S/P hip replacement  b/l      S/P knee replacement      S/P cataract surgery          MEDICATIONS  (STANDING):  amLODIPine   Tablet 5 milliGRAM(s) Oral daily  aspirin  chewable 81 milliGRAM(s) Oral daily  carvedilol 25 milliGRAM(s) Oral every 12 hours  chlorhexidine 2% Cloths 1 Application(s) Topical <User Schedule>  enoxaparin Injectable 40 milliGRAM(s) SubCutaneous every 24 hours  losartan 25 milliGRAM(s) Oral daily  magnesium sulfate  IVPB 2 Gram(s) IV Intermittent once  memantine 5 milliGRAM(s) Oral daily  polyethylene glycol 3350 17 Gram(s) Oral every 12 hours  senna 2 Tablet(s) Oral daily    MEDICATIONS  (PRN):  ondansetron Injectable 4 milliGRAM(s) IV Push once PRN Nausea and/or Vomiting      Allergies  codeine (Unknown)  penicillin (Unknown)      Review of Systems:   Cardiovascular:  No Chest Pain, No Palpitations  Respiratory:  No Cough, No Dyspnea  Gastrointestinal:  As described in HPI  Skin:  No Skin Lesions, No Jaundice  Neuro:  No Syncope, No Dizziness    Physical Examination:  T(C): 37 (11-13-24 @ 13:27), Max: 37 (11-13-24 @ 13:27)  HR: 82 (11-13-24 @ 15:22) (57 - 82)  BP: 149/71 (11-13-24 @ 15:22) (141/60 - 171/79)  RR: 18 (11-13-24 @ 13:27) (18 - 19)  SpO2: 95% (11-13-24 @ 13:27) (92% - 96%)      11-12-24 @ 07:01  -  11-13-24 @ 07:00  --------------------------------------------------------  IN: 0 mL / OUT: 1700 mL / NET: -1700 mL        GENERAL: AAOx3, no acute distress.  HEAD:  Atraumatic, Normocephalic  EYES: conjunctiva and sclera clear  NECK: Supple, no JVD or thyromegaly  CHEST/LUNG: Clear to auscultation bilaterally; No wheeze, rhonchi, or rales  HEART: Regular rate and rhythm; normal S1, S2, No murmurs.  ABDOMEN: Soft, nontender, nondistended; Bowel sounds present  NEUROLOGY: No asterixis or tremor.   SKIN: Intact, no jaundice     Data:                        10.0   7.87  )-----------( 256      ( 13 Nov 2024 06:04 )             30.7     Hgb trend:  10.0  11-13-24 @ 06:04  9.8  11-12-24 @ 06:15  10.6  11-10-24 @ 19:00        11-13    140  |  104  |  7[L]  ----------------------------<  96  3.3[L]   |  28  |  0.6[L]    Ca    10.0      13 Nov 2024 06:04  Mg     1.8     11-12      Liver panel trend:  TBili 0.6   /   AST 12   /   ALT <5   /   AlkP 86   /   Tptn 5.5   /   Alb 3.0    /   DBili --      11-10  TBili 0.6   /   AST 14   /   ALT <5   /   AlkP 77   /   Tptn 5.6   /   Alb 3.0    /   DBili --      11-09  TBili 0.6   /   AST 14   /   ALT 6   /   AlkP 72   /   Tptn 5.8   /   Alb 3.1    /   DBili --      11-07             Radiology:

## 2024-11-13 NOTE — PROGRESS NOTE ADULT - ASSESSMENT
89 yo F with hx of HTN, HLD, CHB s/p DC PPM s/p BiV ICD upgrade, Chronic A.fib (not on AC due to recurrent fall) and HFpEF (G3DD) presents to ED for generalized abdominal pain, constipation and hypokalemia.     # Acute Sigmoid Volvulus vs. Ileus    # Hypokalemia / Hypomagnesemia   #Acute Cystitis ruled out   #Chronic A.fib mpt on AC / HFimpEF s/p AICD BiV   # HTN  HLD       PLANs:    - s/p urgent sigmoidoscopy decompression  - GI and Surgery following, liquid diet, cont w rectal tube, daliy KUB improving   - keep K >4 and Mg > 2  - c/w ASA/ Amlodipine / Losartan / atorvastatin / Losartan / Coreg   - DVT ppx lovenox   - DNR/DNI  Pending: Ileus / Rectal Tube / Hypokalemia / PT jason  Dispo: STR when ready   nephew updated at the bedside 11/13

## 2024-11-13 NOTE — PROGRESS NOTE ADULT - ASSESSMENT
89-year-old female patient with history of hypertension, dyslipidemia, sinus bradycardia status post permanent pacemaker several years ago, congestive heart failure status post AICD, possible history of DVT status post IVC filter on Eliquis, and recent left hip fracture status post replacement around 10 days ago at [facility] who was brought to the ED on November 7 for evaluation of hypokalemia in the setting of 2-day history of colonic ileus on KUB at the nursing home.  She was found to have hypokalemia and evidence of questionable twisting at the sigmoid suggestive of volvulus with moderate gaseous distention of the colon.     #Colon distention s/p flex sig and rectal tube placement on 11/7   #Recent moderate colonic ileus in setting of recent hip replacement and hypokalemia  #Chronic constipation  - s/p Flexible Sigmoidoscopy (11/7) : the scope was traversed through the anus up to the sigmoid colon. No twisting was noted. The colon was noted to be markedly distended with stool residues throughout. Aggressive suctioning was performed and successful decompression was achieved. At the end of the exam, the abdomen was very soft and non distended  - rectal tube output 400 cc   - abdominal exam: soft non tender, mildly distended, pt comfortable  - surgery following   - 11/9: KUB shows improving distention, rectal tube output ~200cc    11:11: Bowel dilation worsening repeat KUB. On exam distended but soft no abdominal pain.   Rectal tube placed with gaseous flush and copious output of liquid stool.  11/12: S/p rectal tube yesterday. KUB much improved, abd soft less distended. 300cc output documented    11/13: Interval placement of rectal tube and decompression of previously seen gaseous distention of the sigmoid. Nonobstructive bowel gas pattern. Abdomen soft. 400cc output in rectal tube overnight.      Plan:  - advance diet as tolerated   - Can DC rectal tube  - Monitor bowel movements/monitor output in rectal tube and avoid constipation: recommend bowel regimen w/ miralax TID and senna qHS to achieve 1 soft daily BM, tap water enemas q12  - supportive care w/ IV fluids per primary team  - Monitor electrolytes closely and replete as needed to keep K >4 and Mg >2  - Recommend PT/OT, encourage ambulation, Frequent repositioning, and incentive spirometry

## 2024-11-14 LAB
ANION GAP SERPL CALC-SCNC: 10 MMOL/L — SIGNIFICANT CHANGE UP (ref 7–14)
BASOPHILS # BLD AUTO: 0.05 K/UL — SIGNIFICANT CHANGE UP (ref 0–0.2)
BASOPHILS NFR BLD AUTO: 0.7 % — SIGNIFICANT CHANGE UP (ref 0–1)
BUN SERPL-MCNC: 8 MG/DL — LOW (ref 10–20)
CALCIUM SERPL-MCNC: 9.8 MG/DL — SIGNIFICANT CHANGE UP (ref 8.4–10.4)
CHLORIDE SERPL-SCNC: 105 MMOL/L — SIGNIFICANT CHANGE UP (ref 98–110)
CO2 SERPL-SCNC: 26 MMOL/L — SIGNIFICANT CHANGE UP (ref 17–32)
CREAT SERPL-MCNC: 0.7 MG/DL — SIGNIFICANT CHANGE UP (ref 0.7–1.5)
EGFR: 80 ML/MIN/1.73M2 — SIGNIFICANT CHANGE UP
EOSINOPHIL # BLD AUTO: 0.43 K/UL — SIGNIFICANT CHANGE UP (ref 0–0.7)
EOSINOPHIL NFR BLD AUTO: 5.8 % — SIGNIFICANT CHANGE UP (ref 0–8)
GLUCOSE SERPL-MCNC: 92 MG/DL — SIGNIFICANT CHANGE UP (ref 70–99)
HCT VFR BLD CALC: 31 % — LOW (ref 37–47)
HGB BLD-MCNC: 10 G/DL — LOW (ref 12–16)
IMM GRANULOCYTES NFR BLD AUTO: 0.5 % — HIGH (ref 0.1–0.3)
LYMPHOCYTES # BLD AUTO: 1.26 K/UL — SIGNIFICANT CHANGE UP (ref 1.2–3.4)
LYMPHOCYTES # BLD AUTO: 16.9 % — LOW (ref 20.5–51.1)
MAGNESIUM SERPL-MCNC: 2.2 MG/DL — SIGNIFICANT CHANGE UP (ref 1.8–2.4)
MCHC RBC-ENTMCNC: 29.6 PG — SIGNIFICANT CHANGE UP (ref 27–31)
MCHC RBC-ENTMCNC: 32.3 G/DL — SIGNIFICANT CHANGE UP (ref 32–37)
MCV RBC AUTO: 91.7 FL — SIGNIFICANT CHANGE UP (ref 81–99)
MONOCYTES # BLD AUTO: 0.69 K/UL — HIGH (ref 0.1–0.6)
MONOCYTES NFR BLD AUTO: 9.2 % — SIGNIFICANT CHANGE UP (ref 1.7–9.3)
NEUTROPHILS # BLD AUTO: 5 K/UL — SIGNIFICANT CHANGE UP (ref 1.4–6.5)
NEUTROPHILS NFR BLD AUTO: 66.9 % — SIGNIFICANT CHANGE UP (ref 42.2–75.2)
NRBC # BLD: 0 /100 WBCS — SIGNIFICANT CHANGE UP (ref 0–0)
PLATELET # BLD AUTO: 269 K/UL — SIGNIFICANT CHANGE UP (ref 130–400)
PMV BLD: 10.9 FL — HIGH (ref 7.4–10.4)
POTASSIUM SERPL-MCNC: 3.3 MMOL/L — LOW (ref 3.5–5)
POTASSIUM SERPL-SCNC: 3.3 MMOL/L — LOW (ref 3.5–5)
RBC # BLD: 3.38 M/UL — LOW (ref 4.2–5.4)
RBC # FLD: 13.8 % — SIGNIFICANT CHANGE UP (ref 11.5–14.5)
SODIUM SERPL-SCNC: 141 MMOL/L — SIGNIFICANT CHANGE UP (ref 135–146)
WBC # BLD: 7.47 K/UL — SIGNIFICANT CHANGE UP (ref 4.8–10.8)
WBC # FLD AUTO: 7.47 K/UL — SIGNIFICANT CHANGE UP (ref 4.8–10.8)

## 2024-11-14 PROCEDURE — 99232 SBSQ HOSP IP/OBS MODERATE 35: CPT

## 2024-11-14 PROCEDURE — 99233 SBSQ HOSP IP/OBS HIGH 50: CPT

## 2024-11-14 RX ORDER — CARVEDILOL 25 MG/1
12.5 TABLET, FILM COATED ORAL EVERY 12 HOURS
Refills: 0 | Status: DISCONTINUED | OUTPATIENT
Start: 2024-11-14 | End: 2024-11-21

## 2024-11-14 RX ORDER — POTASSIUM CHLORIDE 600 MG/1
40 TABLET, EXTENDED RELEASE ORAL
Refills: 0 | Status: COMPLETED | OUTPATIENT
Start: 2024-11-14 | End: 2024-11-14

## 2024-11-14 RX ADMIN — Medication 2 TABLET(S): at 11:15

## 2024-11-14 RX ADMIN — LOSARTAN POTASSIUM 25 MILLIGRAM(S): 100 TABLET, FILM COATED ORAL at 06:46

## 2024-11-14 RX ADMIN — POTASSIUM CHLORIDE 40 MILLIEQUIVALENT(S): 600 TABLET, EXTENDED RELEASE ORAL at 10:34

## 2024-11-14 RX ADMIN — Medication 81 MILLIGRAM(S): at 11:15

## 2024-11-14 RX ADMIN — ENOXAPARIN SODIUM 40 MILLIGRAM(S): 30 INJECTION SUBCUTANEOUS at 06:46

## 2024-11-14 RX ADMIN — Medication 10 MILLIGRAM(S): at 21:46

## 2024-11-14 RX ADMIN — MEMANTINE HYDROCHLORIDE 5 MILLIGRAM(S): 14 CAPSULE, EXTENDED RELEASE ORAL at 11:15

## 2024-11-14 RX ADMIN — AMLODIPINE BESYLATE 5 MILLIGRAM(S): 10 TABLET ORAL at 06:46

## 2024-11-14 RX ADMIN — CHLORHEXIDINE GLUCONATE 1 APPLICATION(S): 1.2 RINSE ORAL at 06:46

## 2024-11-14 RX ADMIN — CARVEDILOL 25 MILLIGRAM(S): 25 TABLET, FILM COATED ORAL at 06:46

## 2024-11-14 RX ADMIN — POTASSIUM CHLORIDE 40 MILLIEQUIVALENT(S): 600 TABLET, EXTENDED RELEASE ORAL at 12:37

## 2024-11-14 RX ADMIN — POLYETHYLENE GLYCOL 3350 17 GRAM(S): 17 POWDER, FOR SOLUTION ORAL at 06:46

## 2024-11-14 RX ADMIN — POLYETHYLENE GLYCOL 3350 17 GRAM(S): 17 POWDER, FOR SOLUTION ORAL at 17:24

## 2024-11-14 NOTE — PHYSICAL THERAPY INITIAL EVALUATION ADULT - GENERAL OBSERVATIONS, REHAB EVAL
15:30-15:56. Pt encountered semifowler in bed in NAD, +staples on L hip incision, + primafit,  no complaints, agreeable to PT.

## 2024-11-14 NOTE — PROGRESS NOTE ADULT - SUBJECTIVE AND OBJECTIVE BOX
Patient is a 89y old  Female who presents with a chief complaint of low potassium and abd pain, constipation (13 Nov 2024 15:47)      OVERNIGHT EVENTS: remained stable,     SUBJECTIVE / INTERVAL HPI: Patient seen and examined at bedside.     VITAL SIGNS:  Vital Signs Last 24 Hrs  T(C): 36.9 (14 Nov 2024 05:00), Max: 37.2 (13 Nov 2024 21:12)  T(F): 98.4 (14 Nov 2024 05:00), Max: 98.9 (13 Nov 2024 21:12)  HR: 62 (14 Nov 2024 05:00) (60 - 82)  BP: 121/77 (14 Nov 2024 05:00) (121/77 - 171/79)  BP(mean): 97 (13 Nov 2024 15:22) (97 - 110)  RR: 17 (14 Nov 2024 05:00) (17 - 18)  SpO2: 96% (14 Nov 2024 07:50) (95% - 98%)    Parameters below as of 14 Nov 2024 07:50  Patient On (Oxygen Delivery Method): room air        PHYSICAL EXAM:    General: old lady chronically looks ill   HEENT: NC/AT; PERRL, clear conjunctiva  Neck: supple  Cardiovascular: +S1/S2; RRR  Respiratory: CTA b/l; no W/R/R  Gastrointestinal: soft, non tender, mildly distended abd,; +BSx4  +ve rpesence of hernandez and rectal tube   Extremities: WWP; 2+ peripheral pulses; no edema   Neurological: AAOx3; no focal deficits    MEDICATIONS:  MEDICATIONS  (STANDING):  amLODIPine   Tablet 5 milliGRAM(s) Oral daily  aspirin  chewable 81 milliGRAM(s) Oral daily  carvedilol 25 milliGRAM(s) Oral every 12 hours  chlorhexidine 2% Cloths 1 Application(s) Topical <User Schedule>  enoxaparin Injectable 40 milliGRAM(s) SubCutaneous every 24 hours  losartan 25 milliGRAM(s) Oral daily  memantine 5 milliGRAM(s) Oral daily  polyethylene glycol 3350 17 Gram(s) Oral every 12 hours  senna 2 Tablet(s) Oral daily    MEDICATIONS  (PRN):  ondansetron Injectable 4 milliGRAM(s) IV Push once PRN Nausea and/or Vomiting      ALLERGIES:  Allergies    codeine (Unknown)  penicillin (Unknown)    Intolerances        LABS:                        10.0   7.47  )-----------( 269      ( 14 Nov 2024 06:32 )             31.0     11-14    141  |  105  |  8[L]  ----------------------------<  92  3.3[L]   |  26  |  0.7    Ca    9.8      14 Nov 2024 06:32        Urinalysis Basic - ( 14 Nov 2024 06:32 )    Color: x / Appearance: x / SG: x / pH: x  Gluc: 92 mg/dL / Ketone: x  / Bili: x / Urobili: x   Blood: x / Protein: x / Nitrite: x   Leuk Esterase: x / RBC: x / WBC x   Sq Epi: x / Non Sq Epi: x / Bacteria: x      CAPILLARY BLOOD GLUCOSE          RADIOLOGY & ADDITIONAL TESTS: Reviewed.    ASSESSMENT:    PLAN:

## 2024-11-14 NOTE — PHYSICAL THERAPY INITIAL EVALUATION ADULT - ADDITIONAL COMMENTS
As per pt report she lives alone in , 4 steps to enter, on first floor, has HHA and states she is never alone.

## 2024-11-14 NOTE — PROGRESS NOTE ADULT - SUBJECTIVE AND OBJECTIVE BOX
Gastroenterology progress note:     Patient is a 89y old  Female who presents with a chief complaint of low potassium and abd pain, constipation (14 Nov 2024 09:45)       Admitted on: 11-07-24    We are following the patient for: large bowel distention       Interval History:    No acute events overnight. Denies pain. Abdomen soft. Having BM. Tolerating soft diet.       PAST MEDICAL & SURGICAL HISTORY:  High cholesterol      HTN (hypertension)      Pacemaker      AICD (automatic cardioverter/defibrillator) present      S/P hip replacement  b/l      S/P knee replacement      S/P cataract surgery          MEDICATIONS  (STANDING):  amLODIPine   Tablet 5 milliGRAM(s) Oral daily  aspirin  chewable 81 milliGRAM(s) Oral daily  bisacodyl 10 milliGRAM(s) Oral at bedtime  carvedilol 12.5 milliGRAM(s) Oral every 12 hours  chlorhexidine 2% Cloths 1 Application(s) Topical <User Schedule>  enoxaparin Injectable 40 milliGRAM(s) SubCutaneous every 24 hours  losartan 25 milliGRAM(s) Oral daily  memantine 5 milliGRAM(s) Oral daily  polyethylene glycol 3350 17 Gram(s) Oral every 12 hours  senna 2 Tablet(s) Oral daily    MEDICATIONS  (PRN):  ondansetron Injectable 4 milliGRAM(s) IV Push once PRN Nausea and/or Vomiting      Allergies  codeine (Unknown)  penicillin (Unknown)      Review of Systems:   Cardiovascular:  No Chest Pain, No Palpitations  Respiratory:  No Cough, No Dyspnea  Gastrointestinal:  As described in HPI  Skin:  No Skin Lesions, No Jaundice  Neuro:  No Syncope, No Dizziness    Physical Examination:  T(C): 36.6 (11-14-24 @ 13:19), Max: 37.2 (11-13-24 @ 21:12)  HR: 60 (11-14-24 @ 13:19) (60 - 62)  BP: 150/79 (11-14-24 @ 13:19) (121/77 - 150/79)  RR: 19 (11-14-24 @ 13:19) (17 - 19)  SpO2: 94% (11-14-24 @ 13:19) (94% - 98%)      11-13-24 @ 07:01  -  11-14-24 @ 07:00  --------------------------------------------------------  IN: 0 mL / OUT: 200 mL / NET: -200 mL        GENERAL: AAOx3, no acute distress.  HEAD:  Atraumatic, Normocephalic  EYES: conjunctiva and sclera clear  NECK: Supple, no JVD or thyromegaly  CHEST/LUNG: Clear to auscultation bilaterally; No wheeze, rhonchi, or rales  HEART: Regular rate and rhythm; normal S1, S2, No murmurs.  ABDOMEN: Soft, nontender, nondistended; Bowel sounds present  NEUROLOGY: No asterixis or tremor.   SKIN: Intact, no jaundice     Data:                        10.0   7.47  )-----------( 269      ( 14 Nov 2024 06:32 )             31.0     Hgb trend:  10.0  11-14-24 @ 06:32  10.0  11-13-24 @ 06:04  9.8  11-12-24 @ 06:15        11-14    141  |  105  |  8[L]  ----------------------------<  92  3.3[L]   |  26  |  0.7    Ca    9.8      14 Nov 2024 06:32  Mg     2.2     11-14      Liver panel trend:  TBili 0.6   /   AST 12   /   ALT <5   /   AlkP 86   /   Tptn 5.5   /   Alb 3.0    /   DBili --      11-10  TBili 0.6   /   AST 14   /   ALT <5   /   AlkP 77   /   Tptn 5.6   /   Alb 3.0    /   DBili --      11-09  TBili 0.6   /   AST 14   /   ALT 6   /   AlkP 72   /   Tptn 5.8   /   Alb 3.1    /   DBili --      11-07             Radiology:

## 2024-11-14 NOTE — PHYSICAL THERAPY INITIAL EVALUATION ADULT - RANGE OF MOTION EXAMINATION, REHAB EVAL
B UE's grossly WFL. B UE's grossly WFL. R LE grossly WFL AAROM. L LE limited knee flex limites 2* to c/o pain in L proximal hip

## 2024-11-14 NOTE — PHYSICAL THERAPY INITIAL EVALUATION ADULT - PLANNED THERAPY INTERVENTIONS, PT EVAL
balance training/bed mobility training/gait training/strengthening/transfer training balance training/bed mobility training/gait training/ROM/strengthening/transfer training

## 2024-11-14 NOTE — PROGRESS NOTE ADULT - ASSESSMENT
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  She was found to have hypokalemia and evidence of questionable twisting at the sigmoid suggestive of volvulus with moderate gaseous distention of the colon.     #Colon distention s/p flex sig and rectal tube placement on 11/7   #Recent moderate colonic ileus in setting of recent hip replacement and hypokalemia  #Chronic constipation  - s/p Flexible Sigmoidoscopy (11/7) : the scope was traversed through the anus up to the sigmoid colon. No twisting was noted. The colon was noted to be markedly distended with stool residues throughout. Aggressive suctioning was performed and successful decompression was achieved. At the end of the exam, the abdomen was very soft and non distended  - rectal tube output 400 cc   - abdominal exam: soft non tender, mildly distended, pt comfortable  - surgery following   - 11/9: KUB shows improving distention, rectal tube output ~200cc    11:11: Bowel dilation worsening repeat KUB. On exam distended but soft no abdominal pain.   Rectal tube placed with gaseous flush and copious output of liquid stool.  11/12: S/p rectal tube yesterday. KUB much improved, abd soft less distended. 300cc output documented    11/13: Interval placement of rectal tube and decompression of previously seen gaseous distention of the sigmoid. Nonobstructive bowel gas pattern. Abdomen soft. 400cc output in rectal tube overnight.    Plan:  - advance diet as tolerated   - Monitor bowel movements and avoid constipation: recommend bowel regimen w/ miralax TID and senna qHS to achieve 1 soft daily BM, tap water enemas q12  - Monitor electrolytes closely and replete as needed to keep K >4 and Mg >2  - Recommend PT/OT, encourage ambulation, Frequent repositioning, and incentive spirometry  - Avoid Anticholingerics, Opioids and Calcium channel blockers

## 2024-11-14 NOTE — PROGRESS NOTE ADULT - ASSESSMENT
89 yo F with hx of HTN, HLD, CHB s/p DC PPM s/p BiV ICD upgrade, Chronic A.fib (not on AC due to recurrent fall) and HFpEF (G3DD) presents to ED for generalized abdominal pain, constipation and hypokalemia.     # Acute Sigmoid Volvulus vs. Ileus    # Hypokalemia / Hypomagnesemia   #Acute Cystitis ruled out   #Chronic A.fib mpt on AC / HFimpEF s/p AICD BiV   # HTN  HLD       PLANs:    - s/p sigmoidoscopy decompression  - GI and Surgery following,  s/p rectal tube removal, tolerates soft diet   - keep K >4 and Mg > 2, will need PO supplements on DC  - c/w ASA/ Amlodipine / Losartan / atorvastatin / Coreg dec 12.5 mg w holding parameter    - DVT ppx lovenox   - DNR/DNI  Pending: clinical improvement, PT charlie TOCHRISTOPHER   Dispo: STR when ready   nephew updated at the bedside 11/13   89 yo F with hx of HTN, HLD, CHB s/p DC PPM s/p BiV ICD upgrade, Chronic A.fib (not on AC due to recurrent fall) and HFpEF (G3DD) presents to ED for generalized abdominal pain, constipation and hypokalemia.     # Acute Sigmoid Volvulus vs. Ileus    # Hypokalemia / Hypomagnesemia   #Acute Cystitis ruled out   #Chronic A.fib mpt on AC / HFimpEF s/p AICD BiV   # HTN  HLD       PLANs:    - s/p sigmoidoscopy decompression  - GI and Surgery following,  s/p rectal tube removal, tolerates soft diet   - keep K >4 and Mg > 2, will need PO supplements on DC  - TOV after PT eval   - c/w ASA/ Amlodipine / Losartan / atorvastatin / Coreg dec 12.5 mg w holding parameter    - DVT ppx lovenox   - DNR/DNI  Pending: clinical improvement, PT eval, TOV   Dispo: STR when ready   nephew updated at the bedside 11/13

## 2024-11-14 NOTE — PHYSICAL THERAPY INITIAL EVALUATION ADULT - PERTINENT HX OF CURRENT PROBLEM, REHAB EVAL
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  She was found to have hypokalemia and evidence of questionable twisting at the sigmoid suggestive of volvulus with moderate gaseous distention of the colon.  Patient being admitted for urgent surgical management. CONFIRM MED RECC IN AM

## 2024-11-15 LAB
ANION GAP SERPL CALC-SCNC: 9 MMOL/L — SIGNIFICANT CHANGE UP (ref 7–14)
BASOPHILS # BLD AUTO: 0.05 K/UL — SIGNIFICANT CHANGE UP (ref 0–0.2)
BASOPHILS NFR BLD AUTO: 0.7 % — SIGNIFICANT CHANGE UP (ref 0–1)
BUN SERPL-MCNC: 8 MG/DL — LOW (ref 10–20)
CALCIUM SERPL-MCNC: 10 MG/DL — SIGNIFICANT CHANGE UP (ref 8.4–10.4)
CHLORIDE SERPL-SCNC: 106 MMOL/L — SIGNIFICANT CHANGE UP (ref 98–110)
CO2 SERPL-SCNC: 24 MMOL/L — SIGNIFICANT CHANGE UP (ref 17–32)
CREAT SERPL-MCNC: 0.6 MG/DL — LOW (ref 0.7–1.5)
EGFR: 83 ML/MIN/1.73M2 — SIGNIFICANT CHANGE UP
EOSINOPHIL # BLD AUTO: 0.39 K/UL — SIGNIFICANT CHANGE UP (ref 0–0.7)
EOSINOPHIL NFR BLD AUTO: 5.6 % — SIGNIFICANT CHANGE UP (ref 0–8)
GLUCOSE SERPL-MCNC: 99 MG/DL — SIGNIFICANT CHANGE UP (ref 70–99)
HCT VFR BLD CALC: 31.1 % — LOW (ref 37–47)
HGB BLD-MCNC: 10 G/DL — LOW (ref 12–16)
IMM GRANULOCYTES NFR BLD AUTO: 0.3 % — SIGNIFICANT CHANGE UP (ref 0.1–0.3)
LYMPHOCYTES # BLD AUTO: 1.26 K/UL — SIGNIFICANT CHANGE UP (ref 1.2–3.4)
LYMPHOCYTES # BLD AUTO: 18 % — LOW (ref 20.5–51.1)
MCHC RBC-ENTMCNC: 29.6 PG — SIGNIFICANT CHANGE UP (ref 27–31)
MCHC RBC-ENTMCNC: 32.2 G/DL — SIGNIFICANT CHANGE UP (ref 32–37)
MCV RBC AUTO: 92 FL — SIGNIFICANT CHANGE UP (ref 81–99)
MONOCYTES # BLD AUTO: 0.67 K/UL — HIGH (ref 0.1–0.6)
MONOCYTES NFR BLD AUTO: 9.6 % — HIGH (ref 1.7–9.3)
NEUTROPHILS # BLD AUTO: 4.61 K/UL — SIGNIFICANT CHANGE UP (ref 1.4–6.5)
NEUTROPHILS NFR BLD AUTO: 65.8 % — SIGNIFICANT CHANGE UP (ref 42.2–75.2)
NRBC # BLD: 0 /100 WBCS — SIGNIFICANT CHANGE UP (ref 0–0)
PLATELET # BLD AUTO: 252 K/UL — SIGNIFICANT CHANGE UP (ref 130–400)
PMV BLD: 10.8 FL — HIGH (ref 7.4–10.4)
POTASSIUM SERPL-MCNC: 3.6 MMOL/L — SIGNIFICANT CHANGE UP (ref 3.5–5)
POTASSIUM SERPL-SCNC: 3.6 MMOL/L — SIGNIFICANT CHANGE UP (ref 3.5–5)
RBC # BLD: 3.38 M/UL — LOW (ref 4.2–5.4)
RBC # FLD: 13.9 % — SIGNIFICANT CHANGE UP (ref 11.5–14.5)
SODIUM SERPL-SCNC: 139 MMOL/L — SIGNIFICANT CHANGE UP (ref 135–146)
WBC # BLD: 7 K/UL — SIGNIFICANT CHANGE UP (ref 4.8–10.8)
WBC # FLD AUTO: 7 K/UL — SIGNIFICANT CHANGE UP (ref 4.8–10.8)

## 2024-11-15 PROCEDURE — 99232 SBSQ HOSP IP/OBS MODERATE 35: CPT

## 2024-11-15 PROCEDURE — 99233 SBSQ HOSP IP/OBS HIGH 50: CPT

## 2024-11-15 PROCEDURE — 74018 RADEX ABDOMEN 1 VIEW: CPT | Mod: 26

## 2024-11-15 RX ORDER — POTASSIUM CHLORIDE 600 MG/1
20 TABLET, EXTENDED RELEASE ORAL DAILY
Refills: 0 | Status: DISCONTINUED | OUTPATIENT
Start: 2024-11-15 | End: 2024-11-21

## 2024-11-15 RX ADMIN — Medication 400 MILLIGRAM(S): at 17:25

## 2024-11-15 RX ADMIN — MEMANTINE HYDROCHLORIDE 5 MILLIGRAM(S): 14 CAPSULE, EXTENDED RELEASE ORAL at 11:19

## 2024-11-15 RX ADMIN — ENOXAPARIN SODIUM 40 MILLIGRAM(S): 30 INJECTION SUBCUTANEOUS at 05:06

## 2024-11-15 RX ADMIN — POTASSIUM CHLORIDE 20 MILLIEQUIVALENT(S): 600 TABLET, EXTENDED RELEASE ORAL at 12:19

## 2024-11-15 RX ADMIN — LOSARTAN POTASSIUM 25 MILLIGRAM(S): 100 TABLET, FILM COATED ORAL at 05:06

## 2024-11-15 RX ADMIN — CHLORHEXIDINE GLUCONATE 1 APPLICATION(S): 1.2 RINSE ORAL at 05:13

## 2024-11-15 RX ADMIN — AMLODIPINE BESYLATE 5 MILLIGRAM(S): 10 TABLET ORAL at 05:06

## 2024-11-15 RX ADMIN — Medication 10 MILLIGRAM(S): at 21:41

## 2024-11-15 RX ADMIN — CARVEDILOL 12.5 MILLIGRAM(S): 25 TABLET, FILM COATED ORAL at 17:25

## 2024-11-15 RX ADMIN — Medication 2 TABLET(S): at 11:19

## 2024-11-15 RX ADMIN — Medication 81 MILLIGRAM(S): at 11:19

## 2024-11-15 RX ADMIN — POLYETHYLENE GLYCOL 3350 17 GRAM(S): 17 POWDER, FOR SOLUTION ORAL at 05:07

## 2024-11-15 NOTE — PROGRESS NOTE ADULT - ASSESSMENT
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  She was found to have hypokalemia and evidence of questionable twisting at the sigmoid suggestive of volvulus with moderate gaseous distention of the colon.     #Colon distention s/p flex sig and rectal tube placement on 11/7   #Recent moderate colonic ileus in setting of recent hip replacement and hypokalemia  #Chronic constipation  - s/p Flexible Sigmoidoscopy (11/7) : the scope was traversed through the anus up to the sigmoid colon. No twisting was noted. The colon was noted to be markedly distended with stool residues throughout. Aggressive suctioning was performed and successful decompression was achieved. At the end of the exam, the abdomen was very soft and non distended  - rectal tube output 400 cc   - abdominal exam: soft non tender, mildly distended, pt comfortable  - surgery following   - 11/9: KUB shows improving distention, rectal tube output ~200cc    11:11: Bowel dilation worsening repeat KUB. On exam distended but soft no abdominal pain.   Rectal tube placed with gaseous flush and copious output of liquid stool.  11/12: S/p rectal tube yesterday. KUB much improved, abd soft less distended. 300cc output documented    11/13: Interval placement of rectal tube and decompression of previously seen gaseous distention of the sigmoid. Nonobstructive bowel gas pattern. Abdomen soft. 400cc output in rectal tube overnight.  11/15: Patient tolerating diet. Abdomen soft. Having BM, large brown Bm noted in bed.     Plan:  - advance diet as tolerated   - Monitor bowel movements and avoid constipation: recommend bowel regimen w/ miralax TID and senna qHS to achieve 1 soft daily BM, tap water enemas q12  - Monitor electrolytes closely and replete as needed to keep K >4 and Mg >2  - Recommend PT/OT, encourage ambulation, Frequent repositioning, and incentive spirometry  - Avoid Anticholingerics, Opioids and Calcium channel blockers  - Please recall GI as needed

## 2024-11-15 NOTE — PROGRESS NOTE ADULT - ASSESSMENT
89 yo F with hx of HTN, HLD, CHB s/p DC PPM s/p BiV ICD upgrade, Chronic A.fib (not on AC due to recurrent fall) and HFpEF (G3DD) presents to ED for generalized abdominal pain, constipation and hypokalemia.     # Acute Sigmoid Volvulus vs. Ileus    # Hypokalemia / Hypomagnesemia   #Acute Cystitis ruled out   #Chronic A.fib mpt on AC / HFimpEF s/p AICD BiV   # HTN  HLD       PLANs:    - s/p sigmoidoscopy decompression  - GI and Surgery following,  s/p rectal tube removal, toleration diet, advance ot regular soft diet   - aim to ahve 1BM per day, senna, miralax, dulcolax, add enema bid  - keep K >4 and Mg > 2, start PO supplements   - TOV after PT eval   - c/w ASA/ Amlodipine / Losartan / atorvastatin / Coreg dec 12.5 mg w holding parameter    - DVT ppx lovenox   - DNR/DNI  Pending: clinical improvement of BM, PT eval, TOV   Dispo: STR [facility] in 24-48 hr   nephew updated at the bedside 11/13

## 2024-11-15 NOTE — PROGRESS NOTE ADULT - SUBJECTIVE AND OBJECTIVE BOX
Patient is a 89y old  Female who presents with a chief complaint of low potassium and abd pain, constipation (14 Nov 2024 17:13)      OVERNIGHT EVENTS: remained stable, tona vents reported     SUBJECTIVE / INTERVAL HPI: Patient seen and examined at bedside.     VITAL SIGNS:  Vital Signs Last 24 Hrs  T(C): 36.8 (15 Nov 2024 05:00), Max: 36.8 (14 Nov 2024 20:16)  T(F): 98.3 (15 Nov 2024 05:00), Max: 98.3 (15 Nov 2024 05:00)  HR: 60 (15 Nov 2024 05:00) (60 - 61)  BP: 156/71 (15 Nov 2024 05:00) (131/72 - 156/71)  BP(mean): 103 (14 Nov 2024 13:19) (103 - 103)  RR: 17 (15 Nov 2024 05:00) (17 - 19)  SpO2: 93% (14 Nov 2024 20:16) (93% - 94%)    Parameters below as of 14 Nov 2024 20:16  Patient On (Oxygen Delivery Method): room air        PHYSICAL EXAM:    General: old lady not in distress   HEENT: NC/AT; PERRL, clear conjunctiva  Neck: supple  Cardiovascular: +S1/S2; RRR  Respiratory: CTA b/l; no W/R/R  Gastrointestinal: soft, NT/ND; +BSx4  Extremities: WWP; 2+ peripheral pulses; no edema   Neurological: AAOx3; globally weak, no focal deficits    MEDICATIONS:  MEDICATIONS  (STANDING):  amLODIPine   Tablet 5 milliGRAM(s) Oral daily  aspirin  chewable 81 milliGRAM(s) Oral daily  bisacodyl 10 milliGRAM(s) Oral at bedtime  carvedilol 12.5 milliGRAM(s) Oral every 12 hours  chlorhexidine 2% Cloths 1 Application(s) Topical <User Schedule>  enoxaparin Injectable 40 milliGRAM(s) SubCutaneous every 24 hours  losartan 25 milliGRAM(s) Oral daily  magnesium oxide 400 milliGRAM(s) Oral two times a day with meals  memantine 5 milliGRAM(s) Oral daily  polyethylene glycol 3350 17 Gram(s) Oral every 12 hours  senna 2 Tablet(s) Oral daily    MEDICATIONS  (PRN):  ondansetron Injectable 4 milliGRAM(s) IV Push once PRN Nausea and/or Vomiting      ALLERGIES:  Allergies    codeine (Unknown)  penicillin (Unknown)    Intolerances        LABS:                        10.0   7.00  )-----------( 252      ( 15 Nov 2024 08:15 )             31.1     11-15    139  |  106  |  8[L]  ----------------------------<  99  3.6   |  24  |  0.6[L]    Ca    10.0      15 Nov 2024 08:15  Mg     2.2     11-14        Urinalysis Basic - ( 15 Nov 2024 08:15 )    Color: x / Appearance: x / SG: x / pH: x  Gluc: 99 mg/dL / Ketone: x  / Bili: x / Urobili: x   Blood: x / Protein: x / Nitrite: x   Leuk Esterase: x / RBC: x / WBC x   Sq Epi: x / Non Sq Epi: x / Bacteria: x      CAPILLARY BLOOD GLUCOSE          RADIOLOGY & ADDITIONAL TESTS: Reviewed.    ASSESSMENT:    PLAN:

## 2024-11-15 NOTE — PROGRESS NOTE ADULT - SUBJECTIVE AND OBJECTIVE BOX
Gastroenterology progress note:     Patient is a 89y old  Female who presents with a chief complaint of low potassium and abd pain, constipation (15 Nov 2024 09:40)       Admitted on: 11-07-24    We are following the patient for: LB distention       Interval History:    No acute events overnight. On exam today patient had large brown bowel movement. Tolerating diet.       PAST MEDICAL & SURGICAL HISTORY:  High cholesterol      HTN (hypertension)      Pacemaker      AICD (automatic cardioverter/defibrillator) present      S/P hip replacement  b/l      S/P knee replacement      S/P cataract surgery          MEDICATIONS  (STANDING):  amLODIPine   Tablet 5 milliGRAM(s) Oral daily  aspirin  chewable 81 milliGRAM(s) Oral daily  bisacodyl 10 milliGRAM(s) Oral at bedtime  carvedilol 12.5 milliGRAM(s) Oral every 12 hours  chlorhexidine 2% Cloths 1 Application(s) Topical <User Schedule>  enoxaparin Injectable 40 milliGRAM(s) SubCutaneous every 24 hours  losartan 25 milliGRAM(s) Oral daily  magnesium oxide 400 milliGRAM(s) Oral two times a day with meals  memantine 5 milliGRAM(s) Oral daily  polyethylene glycol 3350 17 Gram(s) Oral every 12 hours  potassium chloride   Powder 20 milliEquivalent(s) Oral daily  senna 2 Tablet(s) Oral daily    MEDICATIONS  (PRN):  ondansetron Injectable 4 milliGRAM(s) IV Push once PRN Nausea and/or Vomiting      Allergies  codeine (Unknown)  penicillin (Unknown)      Review of Systems:   Cardiovascular:  No Chest Pain, No Palpitations  Respiratory:  No Cough, No Dyspnea  Gastrointestinal:  As described in HPI  Skin:  No Skin Lesions, No Jaundice  Neuro:  No Syncope, No Dizziness    Physical Examination:  T(C): 36.8 (11-15-24 @ 05:00), Max: 36.8 (11-14-24 @ 20:16)  HR: 60 (11-15-24 @ 05:00) (60 - 61)  BP: 156/71 (11-15-24 @ 05:00) (131/72 - 156/71)  RR: 17 (11-15-24 @ 05:00) (17 - 19)  SpO2: 93% (11-14-24 @ 20:16) (93% - 94%)      11-14-24 @ 07:01  -  11-15-24 @ 07:00  --------------------------------------------------------  IN: 0 mL / OUT: 200 mL / NET: -200 mL        GENERAL: AAOx3, no acute distress.  HEAD:  Atraumatic, Normocephalic  EYES: conjunctiva and sclera clear  NECK: Supple, no JVD or thyromegaly  CHEST/LUNG: Clear to auscultation bilaterally; No wheeze, rhonchi, or rales  HEART: Regular rate and rhythm; normal S1, S2, No murmurs.  ABDOMEN: Soft, nontender, nondistended; Bowel sounds present  NEUROLOGY: No asterixis or tremor.   SKIN: Intact, no jaundice     Data:                        10.0   7.00  )-----------( 252      ( 15 Nov 2024 08:15 )             31.1     Hgb trend:  10.0  11-15-24 @ 08:15  10.0  11-14-24 @ 06:32  10.0  11-13-24 @ 06:04        11-15    139  |  106  |  8[L]  ----------------------------<  99  3.6   |  24  |  0.6[L]    Ca    10.0      15 Nov 2024 08:15  Mg     2.2     11-14      Liver panel trend:  TBili 0.6   /   AST 12   /   ALT <5   /   AlkP 86   /   Tptn 5.5   /   Alb 3.0    /   DBili --      11-10  TBili 0.6   /   AST 14   /   ALT <5   /   AlkP 77   /   Tptn 5.6   /   Alb 3.0    /   DBili --      11-09  TBili 0.6   /   AST 14   /   ALT 6   /   AlkP 72   /   Tptn 5.8   /   Alb 3.1    /   DBili --      11-07             Radiology:

## 2024-11-16 LAB
ANION GAP SERPL CALC-SCNC: 12 MMOL/L — SIGNIFICANT CHANGE UP (ref 7–14)
BASOPHILS # BLD AUTO: 0.04 K/UL — SIGNIFICANT CHANGE UP (ref 0–0.2)
BASOPHILS NFR BLD AUTO: 0.6 % — SIGNIFICANT CHANGE UP (ref 0–1)
BUN SERPL-MCNC: 8 MG/DL — LOW (ref 10–20)
CALCIUM SERPL-MCNC: 9.9 MG/DL — SIGNIFICANT CHANGE UP (ref 8.4–10.5)
CHLORIDE SERPL-SCNC: 105 MMOL/L — SIGNIFICANT CHANGE UP (ref 98–110)
CO2 SERPL-SCNC: 22 MMOL/L — SIGNIFICANT CHANGE UP (ref 17–32)
CREAT SERPL-MCNC: 0.6 MG/DL — LOW (ref 0.7–1.5)
EGFR: 83 ML/MIN/1.73M2 — SIGNIFICANT CHANGE UP
EOSINOPHIL # BLD AUTO: 0.33 K/UL — SIGNIFICANT CHANGE UP (ref 0–0.7)
EOSINOPHIL NFR BLD AUTO: 4.9 % — SIGNIFICANT CHANGE UP (ref 0–8)
GLUCOSE SERPL-MCNC: 93 MG/DL — SIGNIFICANT CHANGE UP (ref 70–99)
HCT VFR BLD CALC: 31.3 % — LOW (ref 37–47)
HGB BLD-MCNC: 10.1 G/DL — LOW (ref 12–16)
IMM GRANULOCYTES NFR BLD AUTO: 0.4 % — HIGH (ref 0.1–0.3)
LACTATE SERPL-SCNC: 0.9 MMOL/L — SIGNIFICANT CHANGE UP (ref 0.7–2)
LYMPHOCYTES # BLD AUTO: 1.35 K/UL — SIGNIFICANT CHANGE UP (ref 1.2–3.4)
LYMPHOCYTES # BLD AUTO: 19.9 % — LOW (ref 20.5–51.1)
MCHC RBC-ENTMCNC: 29.6 PG — SIGNIFICANT CHANGE UP (ref 27–31)
MCHC RBC-ENTMCNC: 32.3 G/DL — SIGNIFICANT CHANGE UP (ref 32–37)
MCV RBC AUTO: 91.8 FL — SIGNIFICANT CHANGE UP (ref 81–99)
MONOCYTES # BLD AUTO: 0.68 K/UL — HIGH (ref 0.1–0.6)
MONOCYTES NFR BLD AUTO: 10 % — HIGH (ref 1.7–9.3)
NEUTROPHILS # BLD AUTO: 4.35 K/UL — SIGNIFICANT CHANGE UP (ref 1.4–6.5)
NEUTROPHILS NFR BLD AUTO: 64.2 % — SIGNIFICANT CHANGE UP (ref 42.2–75.2)
NRBC # BLD: 0 /100 WBCS — SIGNIFICANT CHANGE UP (ref 0–0)
PLATELET # BLD AUTO: 272 K/UL — SIGNIFICANT CHANGE UP (ref 130–400)
PMV BLD: 11.1 FL — HIGH (ref 7.4–10.4)
POTASSIUM SERPL-MCNC: 3.4 MMOL/L — LOW (ref 3.5–5)
POTASSIUM SERPL-SCNC: 3.4 MMOL/L — LOW (ref 3.5–5)
RBC # BLD: 3.41 M/UL — LOW (ref 4.2–5.4)
RBC # FLD: 13.6 % — SIGNIFICANT CHANGE UP (ref 11.5–14.5)
SODIUM SERPL-SCNC: 139 MMOL/L — SIGNIFICANT CHANGE UP (ref 135–146)
WBC # BLD: 6.78 K/UL — SIGNIFICANT CHANGE UP (ref 4.8–10.8)
WBC # FLD AUTO: 6.78 K/UL — SIGNIFICANT CHANGE UP (ref 4.8–10.8)

## 2024-11-16 PROCEDURE — 99232 SBSQ HOSP IP/OBS MODERATE 35: CPT

## 2024-11-16 PROCEDURE — 74018 RADEX ABDOMEN 1 VIEW: CPT | Mod: 26

## 2024-11-16 RX ORDER — POTASSIUM CHLORIDE 600 MG/1
20 TABLET, EXTENDED RELEASE ORAL ONCE
Refills: 0 | Status: COMPLETED | OUTPATIENT
Start: 2024-11-16 | End: 2024-11-16

## 2024-11-16 RX ORDER — ZINC OXIDE/BENZETHONIUM CHLOR
1 OINTMENT (GRAM) TOPICAL
Refills: 0 | Status: DISCONTINUED | OUTPATIENT
Start: 2024-11-16 | End: 2024-11-21

## 2024-11-16 RX ADMIN — POTASSIUM CHLORIDE 50 MILLIEQUIVALENT(S): 600 TABLET, EXTENDED RELEASE ORAL at 12:56

## 2024-11-16 RX ADMIN — Medication 400 MILLIGRAM(S): at 17:06

## 2024-11-16 RX ADMIN — Medication 2 TABLET(S): at 11:25

## 2024-11-16 RX ADMIN — POTASSIUM CHLORIDE 20 MILLIEQUIVALENT(S): 600 TABLET, EXTENDED RELEASE ORAL at 11:25

## 2024-11-16 RX ADMIN — MEMANTINE HYDROCHLORIDE 5 MILLIGRAM(S): 14 CAPSULE, EXTENDED RELEASE ORAL at 11:25

## 2024-11-16 RX ADMIN — CARVEDILOL 12.5 MILLIGRAM(S): 25 TABLET, FILM COATED ORAL at 17:06

## 2024-11-16 RX ADMIN — Medication 81 MILLIGRAM(S): at 11:25

## 2024-11-16 RX ADMIN — ENOXAPARIN SODIUM 40 MILLIGRAM(S): 30 INJECTION SUBCUTANEOUS at 05:38

## 2024-11-16 RX ADMIN — Medication 400 MILLIGRAM(S): at 08:05

## 2024-11-16 RX ADMIN — POLYETHYLENE GLYCOL 3350 17 GRAM(S): 17 POWDER, FOR SOLUTION ORAL at 05:37

## 2024-11-16 RX ADMIN — AMLODIPINE BESYLATE 5 MILLIGRAM(S): 10 TABLET ORAL at 05:37

## 2024-11-16 RX ADMIN — LOSARTAN POTASSIUM 25 MILLIGRAM(S): 100 TABLET, FILM COATED ORAL at 05:37

## 2024-11-16 RX ADMIN — CHLORHEXIDINE GLUCONATE 1 APPLICATION(S): 1.2 RINSE ORAL at 05:43

## 2024-11-16 RX ADMIN — Medication 10 MILLIGRAM(S): at 21:52

## 2024-11-16 NOTE — PROGRESS NOTE ADULT - ASSESSMENT
89 yo F with hx of HTN, HLD, CHB s/p DC PPM s/p BiV ICD upgrade, Chronic A.fib (not on AC due to recurrent fall) and HFpEF (G3DD) presents to ED for generalized abdominal pain, constipation and hypokalemia.     # Acute Sigmoid Volvulus vs. Ileus    # Hypokalemia / Hypomagnesemia   #Acute Cystitis ruled out   #Chronic A.fib mpt on AC / HFimpEF s/p AICD BiV   # HTN  HLD       PLANs:    - s/p sigmoidoscopy decompression  - GI and Surgery following,  s/p rectal tube removal, toleration diet, tolerate sdiet, had BMs past 2 days  - today abd is mildly ditended, KUB w increase colonic distention,   - will monitor and repeat KUB, p  - aim to have 1BM per day, senna, miralax, dulcolax, Enema PRN  - keep K >4 and Mg > 2, start PO supplements   - passed TOV  - c/w ASA/ Amlodipine / Losartan / atorvastatin / Coreg dec 12.5 mg w holding parameter    - DVT ppx lovenox   - DNR/DNI  Pending: clinical improvement of abd distention, and having BMs,   Dispo: STR, nephew updated at the bedside 11/15 89 yo F with hx of HTN, HLD, CHB s/p DC PPM s/p BiV ICD upgrade, Chronic A.fib (not on AC due to recurrent fall) and HFpEF (G3DD) presents to ED for generalized abdominal pain, constipation and hypokalemia.     # Acute Sigmoid Volvulus vs. Ileus    # Hypokalemia / Hypomagnesemia   #Acute Cystitis ruled out   #Chronic A.fib mpt on AC / HFimpEF s/p AICD BiV   # HTN  HLD       PLANs:    - s/p sigmoidoscopy decompression  - GI and Surgery following,  s/p rectal tube removal, toleration diet, tolerate sdiet, had BMs past 2 days  - today abd is mildly ditended, KUB w increase colonic distention,   - will monitor and repeat KUB, diet as CLL, check lactate, might need rectal tube again, GI aware   - aim to have 1BM per day, senna, miralax, dulcolax, Enema PRN  - keep K >4 and Mg > 2, start PO supplements   - passed TOV  - c/w ASA/ Amlodipine / Losartan / atorvastatin / Coreg dec 12.5 mg w holding parameter    - DVT ppx lovenox   - DNR/DNI  Pending: clinical improvement of abd distention, and having BMs,   Dispo: STR, nephew updated at the bedside 11/15

## 2024-11-16 NOTE — PROGRESS NOTE ADULT - SUBJECTIVE AND OBJECTIVE BOX
Gastroenterology Follow Up Note      Location: [facility] 3A 013 A ([facility] 3A)  Patient Name: RAMA MARMOLEJO  Age: 89y  Gender: Female      Chief Complaint  Patient is a 89y old Female who presents with a chief complaint of low potassium and abd pain, constipation (16 Nov 2024 12:41)  Primary diagnosis of Hypokalemia      Reason for Consult  Colonic Distention      Progress Note  This morning patient was seen and examined at bedside.    Today is hospital day 9d.  Patient had increased abdominal distention.  She notes some pressure-like abdominal discomfort.  She has otherwise been tolerating PO diet and denied nausea or vomiting.  Last bowel movement was on 11/14.  Her last flatus was on 11/14.      Vital Signs in the last 24 hours   Vitals Summary T(C): 36.8 (11-16-24 @ 20:23), Max: 36.9 (11-16-24 @ 13:35)  HR: 82 (11-16-24 @ 20:23) (60 - 82)  BP: 135/79 (11-16-24 @ 20:23) (130/60 - 146/72)  RR: 18 (11-16-24 @ 20:23) (17 - 18)  SpO2: --  Vent Data   Intake/ Output   Measurements       Physical Exam  * General Appearance: Alert, cooperative, interactive but hard of hearing, oriented to time, place, and person, in no acute distress  * Eyes: PERRL, conjunctiva/corneas clear, EOM's intact, fundi benign, both eyes  * Lungs: Good bilateral air entry, normal breath sounds (Clear to auscultation bilaterally, no audible wheezes, crackles, or rhonchi)  * Heart: Regular Rate and Rhythm, normal S1 and S2, no audible murmur, rub, or gallop  * Abdomen: Symmetric, softly moderately distended prior to rectal tube placement -> improved post rectal tube on 11/16, non-tender, bowel sounds active all four quadrants  * Rectal: s/p rectal tube on 11/16 with drainage of yellow brown stools; air heard on insertion of finger      Investigations   Laboratory Workup      - CBC:                        10.1   6.78  )-----------( 272      ( 16 Nov 2024 07:19 )             31.3       - Hgb Trend:  10.1  11-16-24 @ 07:19  10.0  11-15-24 @ 08:15  10.0  11-14-24 @ 06:32          - Chemistry:  11-16    139  |  105  |  8[L]  ----------------------------<  93  3.4[L]   |  22  |  0.6[L]    Ca    9.9      16 Nov 2024 07:19      Liver panel trend:  TBili 0.6   /   AST 12   /   ALT <5   /   AlkP 86   /   Tptn 5.5   /   Alb 3.0    /   DBili --      11-10  TBili 0.6   /   AST 14   /   ALT <5   /   AlkP 77   /   Tptn 5.6   /   Alb 3.0    /   DBili --      11-09  TBili 0.6   /   AST 14   /   ALT 6   /   AlkP 72   /   Tptn 5.8   /   Alb 3.1    /   DBili --      11-07      - Coagulation Studies:      - ABG:      - Cardiac Markers:        Microbiological Workup  Urinalysis Basic - ( 16 Nov 2024 07:19 )    Color: x / Appearance: x / SG: x / pH: x  Gluc: 93 mg/dL / Ketone: x  / Bili: x / Urobili: x   Blood: x / Protein: x / Nitrite: x   Leuk Esterase: x / RBC: x / WBC x   Sq Epi: x / Non Sq Epi: x / Bacteria: x          Radiological Workup            Current Medications  Standing Medications  amLODIPine   Tablet 5 milliGRAM(s) Oral daily  aspirin  chewable 81 milliGRAM(s) Oral daily  bisacodyl 10 milliGRAM(s) Oral at bedtime  carvedilol 12.5 milliGRAM(s) Oral every 12 hours  chlorhexidine 2% Cloths 1 Application(s) Topical <User Schedule>  enoxaparin Injectable 40 milliGRAM(s) SubCutaneous every 24 hours  losartan 25 milliGRAM(s) Oral daily  magnesium oxide 400 milliGRAM(s) Oral two times a day with meals  memantine 5 milliGRAM(s) Oral daily  polyethylene glycol 3350 17 Gram(s) Oral every 12 hours  potassium chloride   Powder 20 milliEquivalent(s) Oral daily  senna 2 Tablet(s) Oral daily    PRN Medications  ondansetron Injectable 4 milliGRAM(s) IV Push once PRN Nausea and/or Vomiting    Singles Doses Administered  (ADM OVERRIDE) 1 each &lt;see task&gt; GiveOnce  (ADM OVERRIDE) 1 each &lt;see task&gt; GiveOnce  (ADM OVERRIDE) 1 each &lt;see task&gt; GiveOnce  (ADM OVERRIDE) 1 each &lt;see task&gt; GiveOnce  (ADM OVERRIDE) 2 each &lt;see task&gt; GiveOnce  (ADM OVERRIDE) 2 each &lt;see task&gt; GiveOnce  acetaminophen     Tablet .. 650 milliGRAM(s) Oral once PRN  amLODIPine   Tablet 5 milliGRAM(s) Oral once  lactated ringers Bolus 1000 milliLiter(s) IV Bolus once  magnesium sulfate  IVPB 2 Gram(s) IV Intermittent once  magnesium sulfate  IVPB 2 Gram(s) IV Intermittent Once  magnesium sulfate  IVPB 2 Gram(s) IV Intermittent once  midazolam  1 mG/mL Injectable (Rx Quick Charge) 2 milliGRAM(s) IV Push   potassium chloride   Powder 40 milliEquivalent(s) Oral Once  potassium chloride   Powder 40 milliEquivalent(s) Oral two times a day  potassium chloride   Powder 40 milliEquivalent(s) Oral every 2 hours  potassium chloride  20 mEq/100 mL IVPB 20 milliEquivalent(s) IV Intermittent once  potassium chloride  20 mEq/100 mL IVPB 20 milliEquivalent(s) IV Intermittent every 2 hours  potassium chloride  20 mEq/100 mL IVPB 20 milliEquivalent(s) IV Intermittent once  potassium chloride  20 mEq/100 mL IVPB 20 milliEquivalent(s) IV Intermittent every 2 hours  potassium chloride  20 mEq/100 mL IVPB 20 milliEquivalent(s) IV Intermittent every 2 hours  potassium chloride  20 mEq/100 mL IVPB 20 milliEquivalent(s) IV Intermittent every 2 hours

## 2024-11-16 NOTE — PROGRESS NOTE ADULT - ASSESSMENT
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  She was found to have hypokalemia and evidence of questionable twisting at the sigmoid suggestive of volvulus with moderate gaseous distention of the colon.  We are contacted in the setting of concern of sigmoid volvulus.      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 with moderate gaseous distention of the colon/pseudoobstruction s/p rectal tube replacement 11/16  Recent moderate colonic ileus in setting of recent hip replacement and hypokalemia (on lasix)  Chronic constipation  Chronic anemia with no overt GI bleed  * Was admitted recently to [facility] for left hip fracture status post left hip replacement around 10 days ago  * Reports chronic constipation with 2 days of left lower quadrant discomfort (nonradiating, mild around 2/10 in intensity); last flatus 2 days ago; no nausea or vomiting or fever or chills; reports intentional subjective weight loss; no melena or hematochezia; no dysphagia.  She was evaluated at the nursing home: Status post KUB on November 5 in November 6 showing stable moderate colonic ileus.  Status post blood work showing potassium of 2.5 at the nursing home on November 5    * Currently hemodynamically stable  * Labs: WBC 6.78, Hb 10.1, Plt 272, Na 139, K 3.4, BUN 8, Cr 0.6 on 11/16  * INR 1.56 ; Lactate 0.7  * CT abdomen pelvis with IV contrast revealed questionable twisting of the sigmoid mesentery suggestive of volvulus, moderate gaseous distention of the colon with liquid stool in the rectum, right lower quadrant calcified 2.6 cm density  * No previous EGD or colonoscopy per patient and nephew  * No family history of GI malignancies  * S/P Flexible Sigmoidoscopy (11/7) : the scope was traversed through the anus up to the sigmoid colon. No twisting was noted. The colon was noted to be markedly distended with stool residues throughout. Aggressive suctioning was performed and successful decompression was achieved. At the end of the exam, the abdomen was very soft and non distended  * S/P rectal tube placement on 11/07   * Repeat KUB 11/09 resolved distention  * Repeat KUB 11/10-11/11: increased distention   * S/P rectal tube placement on 11/11  * Repeat KUB 11/12-11/13: decompressed -> rectal tube removed 11/14  * Repeat KUB 11/15-11/16: increasing gaseous distention  * S/P rectal tube placement on 11/16 -> soft abdomen; non distended    RECOMMENDATIONS  - Status post rectal tube re-placement on 11/16 -> monitor output  - Will discuss scheduling patient for a full colonoscopy next week  - Repeat KUB tonight then in AM and daily thereafter  - Monitor bowel movements and flatus: avoid constipation. Would continue bowel regimen (senna 2 tabs, miralax 17g BID, dulcolax 10mg QD, magnesium oxide 400mg BID)  - Recommend surgery follow up   - Repeat lactic acid  - Recommend serial abdominal exams  - Recommend PT/OT and incentive spirometry  - Monitor electrolytes and replete as indicated  - Avoid calcium channel blocker, sedatives, opioids, and anticholinergics  - Trend CBC and keep active type and screen.  In the setting of stable hemoglobin and absence of overt bleeding, would continue necessity anticoagulation if deemed necessary and if benefits outweigh risks  - Recommend outpatient follow-up at the GI MAC clinic in the setting of chronic anemia for further workup if within goals of care.  Discussed with the healthcare proxy but withhold of screening colonoscopy for now      Thank you for your consult.  - Please note that plan was communicated with medical team.   - Please reach GI on [phone] during weekdays till 5pm.  - Please call the GI service line after 5pm on Weekdays and anytime on Weekends: [phone].      Kamar Mercado MD  PGY - 5 Gastroenterology Fellow   [facility]

## 2024-11-16 NOTE — PROGRESS NOTE ADULT - SUBJECTIVE AND OBJECTIVE BOX
Patient is a 89y old  Female who presents with a chief complaint of low potassium and abd pain, constipation (15 Nov 2024 13:03)      OVERNIGHT EVENTS: feesl fine, no BMs today but had 2 yesterday     SUBJECTIVE / INTERVAL HPI: Patient seen and examined at bedside.     VITAL SIGNS:  Vital Signs Last 24 Hrs  T(C): 36.8 (16 Nov 2024 05:00), Max: 36.9 (15 Nov 2024 20:00)  T(F): 98.2 (16 Nov 2024 05:00), Max: 98.4 (15 Nov 2024 20:00)  HR: 60 (16 Nov 2024 05:42) (60 - 69)  BP: 146/72 (16 Nov 2024 05:00) (130/69 - 153/72)  BP(mean): 99 (15 Nov 2024 13:30) (99 - 99)  RR: 17 (16 Nov 2024 05:00) (17 - 18)  SpO2: 95% (15 Nov 2024 20:00) (94% - 95%)    Parameters below as of 15 Nov 2024 20:00  Patient On (Oxygen Delivery Method): room air        PHYSICAL EXAM:    General: old lady not in distress   HEENT: NC/AT; PERRL, clear conjunctiva  Neck: supple  Cardiovascular: +S1/S2; RRR  Respiratory: CTA b/l; no W/R/R  Gastrointestinal: soft, non tender, mild abd distention; +BSx4  Extremities: WWP; 2+ peripheral pulses; no edema   Neurological: AAOx3; no focal deficits    MEDICATIONS:  MEDICATIONS  (STANDING):  amLODIPine   Tablet 5 milliGRAM(s) Oral daily  aspirin  chewable 81 milliGRAM(s) Oral daily  bisacodyl 10 milliGRAM(s) Oral at bedtime  carvedilol 12.5 milliGRAM(s) Oral every 12 hours  chlorhexidine 2% Cloths 1 Application(s) Topical <User Schedule>  enoxaparin Injectable 40 milliGRAM(s) SubCutaneous every 24 hours  losartan 25 milliGRAM(s) Oral daily  magnesium oxide 400 milliGRAM(s) Oral two times a day with meals  memantine 5 milliGRAM(s) Oral daily  polyethylene glycol 3350 17 Gram(s) Oral every 12 hours  potassium chloride   Powder 20 milliEquivalent(s) Oral daily  potassium chloride  20 mEq/100 mL IVPB 20 milliEquivalent(s) IV Intermittent once  senna 2 Tablet(s) Oral daily    MEDICATIONS  (PRN):  ondansetron Injectable 4 milliGRAM(s) IV Push once PRN Nausea and/or Vomiting      ALLERGIES:  Allergies    codeine (Unknown)  penicillin (Unknown)    Intolerances        LABS:                        10.1   6.78  )-----------( 272      ( 16 Nov 2024 07:19 )             31.3     11-16    139  |  105  |  8[L]  ----------------------------<  93  3.4[L]   |  22  |  0.6[L]    Ca    9.9      16 Nov 2024 07:19        Urinalysis Basic - ( 16 Nov 2024 07:19 )    Color: x / Appearance: x / SG: x / pH: x  Gluc: 93 mg/dL / Ketone: x  / Bili: x / Urobili: x   Blood: x / Protein: x / Nitrite: x   Leuk Esterase: x / RBC: x / WBC x   Sq Epi: x / Non Sq Epi: x / Bacteria: x      CAPILLARY BLOOD GLUCOSE          RADIOLOGY & ADDITIONAL TESTS: Reviewed.    ASSESSMENT:    PLAN:

## 2024-11-17 LAB
ALBUMIN SERPL ELPH-MCNC: 3.1 G/DL — LOW (ref 3.5–5.2)
ALP SERPL-CCNC: 91 U/L — SIGNIFICANT CHANGE UP (ref 30–115)
ALT FLD-CCNC: 6 U/L — SIGNIFICANT CHANGE UP (ref 0–41)
ANION GAP SERPL CALC-SCNC: 12 MMOL/L — SIGNIFICANT CHANGE UP (ref 7–14)
AST SERPL-CCNC: 18 U/L — SIGNIFICANT CHANGE UP (ref 0–41)
BASOPHILS # BLD AUTO: 0.03 K/UL — SIGNIFICANT CHANGE UP (ref 0–0.2)
BASOPHILS NFR BLD AUTO: 0.4 % — SIGNIFICANT CHANGE UP (ref 0–1)
BILIRUB SERPL-MCNC: 0.5 MG/DL — SIGNIFICANT CHANGE UP (ref 0.2–1.2)
BUN SERPL-MCNC: 8 MG/DL — LOW (ref 10–20)
CALCIUM SERPL-MCNC: 10 MG/DL — SIGNIFICANT CHANGE UP (ref 8.4–10.5)
CHLORIDE SERPL-SCNC: 103 MMOL/L — SIGNIFICANT CHANGE UP (ref 98–110)
CO2 SERPL-SCNC: 24 MMOL/L — SIGNIFICANT CHANGE UP (ref 17–32)
CREAT SERPL-MCNC: 0.6 MG/DL — LOW (ref 0.7–1.5)
EGFR: 83 ML/MIN/1.73M2 — SIGNIFICANT CHANGE UP
EOSINOPHIL # BLD AUTO: 0.34 K/UL — SIGNIFICANT CHANGE UP (ref 0–0.7)
EOSINOPHIL NFR BLD AUTO: 4.7 % — SIGNIFICANT CHANGE UP (ref 0–8)
GLUCOSE SERPL-MCNC: 85 MG/DL — SIGNIFICANT CHANGE UP (ref 70–99)
HCT VFR BLD CALC: 33.1 % — LOW (ref 37–47)
HGB BLD-MCNC: 10.6 G/DL — LOW (ref 12–16)
IMM GRANULOCYTES NFR BLD AUTO: 0.4 % — HIGH (ref 0.1–0.3)
LYMPHOCYTES # BLD AUTO: 1.12 K/UL — LOW (ref 1.2–3.4)
LYMPHOCYTES # BLD AUTO: 15.6 % — LOW (ref 20.5–51.1)
MAGNESIUM SERPL-MCNC: 2 MG/DL — SIGNIFICANT CHANGE UP (ref 1.8–2.4)
MCHC RBC-ENTMCNC: 29.3 PG — SIGNIFICANT CHANGE UP (ref 27–31)
MCHC RBC-ENTMCNC: 32 G/DL — SIGNIFICANT CHANGE UP (ref 32–37)
MCV RBC AUTO: 91.4 FL — SIGNIFICANT CHANGE UP (ref 81–99)
MONOCYTES # BLD AUTO: 0.61 K/UL — HIGH (ref 0.1–0.6)
MONOCYTES NFR BLD AUTO: 8.5 % — SIGNIFICANT CHANGE UP (ref 1.7–9.3)
NEUTROPHILS # BLD AUTO: 5.07 K/UL — SIGNIFICANT CHANGE UP (ref 1.4–6.5)
NEUTROPHILS NFR BLD AUTO: 70.4 % — SIGNIFICANT CHANGE UP (ref 42.2–75.2)
NRBC # BLD: 0 /100 WBCS — SIGNIFICANT CHANGE UP (ref 0–0)
PLATELET # BLD AUTO: 291 K/UL — SIGNIFICANT CHANGE UP (ref 130–400)
PMV BLD: 11.1 FL — HIGH (ref 7.4–10.4)
POTASSIUM SERPL-MCNC: 3.5 MMOL/L — SIGNIFICANT CHANGE UP (ref 3.5–5)
POTASSIUM SERPL-SCNC: 3.5 MMOL/L — SIGNIFICANT CHANGE UP (ref 3.5–5)
PROT SERPL-MCNC: 5.8 G/DL — LOW (ref 6–8)
RBC # BLD: 3.62 M/UL — LOW (ref 4.2–5.4)
RBC # FLD: 13.5 % — SIGNIFICANT CHANGE UP (ref 11.5–14.5)
SODIUM SERPL-SCNC: 139 MMOL/L — SIGNIFICANT CHANGE UP (ref 135–146)
WBC # BLD: 7.2 K/UL — SIGNIFICANT CHANGE UP (ref 4.8–10.8)
WBC # FLD AUTO: 7.2 K/UL — SIGNIFICANT CHANGE UP (ref 4.8–10.8)

## 2024-11-17 PROCEDURE — 74018 RADEX ABDOMEN 1 VIEW: CPT | Mod: 26

## 2024-11-17 PROCEDURE — 71045 X-RAY EXAM CHEST 1 VIEW: CPT | Mod: 26

## 2024-11-17 PROCEDURE — 99232 SBSQ HOSP IP/OBS MODERATE 35: CPT

## 2024-11-17 PROCEDURE — 99233 SBSQ HOSP IP/OBS HIGH 50: CPT

## 2024-11-17 RX ADMIN — CARVEDILOL 12.5 MILLIGRAM(S): 25 TABLET, FILM COATED ORAL at 05:22

## 2024-11-17 RX ADMIN — CARVEDILOL 12.5 MILLIGRAM(S): 25 TABLET, FILM COATED ORAL at 17:16

## 2024-11-17 RX ADMIN — POLYETHYLENE GLYCOL 3350 17 GRAM(S): 17 POWDER, FOR SOLUTION ORAL at 17:16

## 2024-11-17 RX ADMIN — CHLORHEXIDINE GLUCONATE 1 APPLICATION(S): 1.2 RINSE ORAL at 05:24

## 2024-11-17 RX ADMIN — Medication 10 MILLIGRAM(S): at 21:46

## 2024-11-17 RX ADMIN — ENOXAPARIN SODIUM 40 MILLIGRAM(S): 30 INJECTION SUBCUTANEOUS at 05:22

## 2024-11-17 RX ADMIN — MEMANTINE HYDROCHLORIDE 5 MILLIGRAM(S): 14 CAPSULE, EXTENDED RELEASE ORAL at 11:34

## 2024-11-17 RX ADMIN — Medication 81 MILLIGRAM(S): at 11:33

## 2024-11-17 RX ADMIN — LOSARTAN POTASSIUM 25 MILLIGRAM(S): 100 TABLET, FILM COATED ORAL at 05:23

## 2024-11-17 RX ADMIN — Medication 1 APPLICATION(S): at 06:39

## 2024-11-17 RX ADMIN — Medication 400 MILLIGRAM(S): at 17:16

## 2024-11-17 RX ADMIN — Medication 2 TABLET(S): at 11:34

## 2024-11-17 RX ADMIN — Medication 1 APPLICATION(S): at 17:19

## 2024-11-17 RX ADMIN — AMLODIPINE BESYLATE 5 MILLIGRAM(S): 10 TABLET ORAL at 05:23

## 2024-11-17 NOTE — PROGRESS NOTE ADULT - SUBJECTIVE AND OBJECTIVE BOX
Gastroenterology Follow Up Note      Location: [facility] 3A 013 A ([facility] 3A)  Patient Name: RAMA MARMOLEJO  Age: 89y  Gender: Female      Chief Complaint  Patient is a 89y old Female who presents with a chief complaint of low potassium and abd pain, constipation (16 Nov 2024 12:41)  Primary diagnosis of Hypokalemia      Reason for Consult  Colonic Distention      Progress Note  This morning patient was seen and examined at bedside.    Today is hospital day 10d.  Patient had increased abdominal distention on 11/16 s/p rectal tube placement (draining brown stools on 11/17).  She notes improvement/near resolution of pressure-like abdominal discomfort.  She has otherwise been tolerating PO diet and denied nausea or vomiting.  Her last flatus was on 11/14.      Vital Signs in the last 24 hours   T(C): 37 (17 Nov 2024 04:54), Max: 37 (17 Nov 2024 04:54)  T(F): 98.6 (17 Nov 2024 04:54), Max: 98.6 (17 Nov 2024 04:54)  HR: 70 (17 Nov 2024 04:54) (70 - 82)  BP: 133/83 (17 Nov 2024 04:54) (133/83 - 135/79)  BP(mean): 100 (17 Nov 2024 04:54) (100 - 100)  ABP: --  ABP(mean): --  RR: 19 (17 Nov 2024 04:54) (18 - 19)  SpO2: 96% (17 Nov 2024 04:54) (96% - 96%)    O2 Parameters below as of 17 Nov 2024 04:54  Patient On (Oxygen Delivery Method): room air      Physical Exam  * General Appearance: Alert, cooperative, interactive but hard of hearing, oriented to time, place, and person, in no acute distress  * Eyes: PERRL, conjunctiva/corneas clear, EOM's intact, fundi benign, both eyes  * Lungs: Good bilateral air entry, normal breath sounds (Clear to auscultation bilaterally, no audible wheezes, crackles, or rhonchi)  * Heart: Regular Rate and Rhythm, normal S1 and S2, no audible murmur, rub, or gallop  * Abdomen: Symmetric, softly moderately distended prior to rectal tube placement -> improved post rectal tube on 11/16, non-tender, bowel sounds active all four quadrants  * Rectal: s/p rectal tube on 11/16 with drainage of yellow brown stools; air heard on insertion of finger      Investigations             10.6   7.20  )-----------( 291      ( 17 Nov 2024 07:47 )             33.1     11-17    139  |  103  |  8[L]  ----------------------------<  85  3.5   |  24  |  0.6[L]    Ca    10.0      17 Nov 2024 07:47  Mg     2.0     11-17    TPro  5.8[L]  /  Alb  3.1[L]  /  TBili  0.5  /  DBili  x   /  AST  18  /  ALT  6   /  AlkPhos  91  11-17      LIVER FUNCTIONS - ( 17 Nov 2024 07:47 )  Alb: 3.1 g/dL / Pro: 5.8 g/dL / ALK PHOS: 91 U/L / ALT: 6 U/L / AST: 18 U/L / GGT: x             Urinalysis Basic - ( 17 Nov 2024 07:47 )    Color: x / Appearance: x / SG: x / pH: x  Gluc: 85 mg/dL / Ketone: x  / Bili: x / Urobili: x   Blood: x / Protein: x / Nitrite: x   Leuk Esterase: x / RBC: x / WBC x   Sq Epi: x / Non Sq Epi: x / Bacteria: x      Current Medications  Standing Medications  amLODIPine   Tablet 5 milliGRAM(s) Oral daily  aspirin  chewable 81 milliGRAM(s) Oral daily  bisacodyl 10 milliGRAM(s) Oral at bedtime  carvedilol 12.5 milliGRAM(s) Oral every 12 hours  chlorhexidine 2% Cloths 1 Application(s) Topical <User Schedule>  enoxaparin Injectable 40 milliGRAM(s) SubCutaneous every 24 hours  losartan 25 milliGRAM(s) Oral daily  magnesium oxide 400 milliGRAM(s) Oral two times a day with meals  memantine 5 milliGRAM(s) Oral daily  polyethylene glycol 3350 17 Gram(s) Oral every 12 hours  potassium chloride   Powder 20 milliEquivalent(s) Oral daily  senna 2 Tablet(s) Oral daily    PRN Medications  ondansetron Injectable 4 milliGRAM(s) IV Push once PRN Nausea and/or Vomiting    Singles Doses Administered  (ADM OVERRIDE) 1 each &lt;see task&gt; GiveOnce  (ADM OVERRIDE) 1 each &lt;see task&gt; GiveOnce  (ADM OVERRIDE) 1 each &lt;see task&gt; GiveOnce  (ADM OVERRIDE) 1 each &lt;see task&gt; GiveOnce  (ADM OVERRIDE) 2 each &lt;see task&gt; GiveOnce  (ADM OVERRIDE) 2 each &lt;see task&gt; GiveOnce  acetaminophen     Tablet .. 650 milliGRAM(s) Oral once PRN  amLODIPine   Tablet 5 milliGRAM(s) Oral once  lactated ringers Bolus 1000 milliLiter(s) IV Bolus once  magnesium sulfate  IVPB 2 Gram(s) IV Intermittent once  magnesium sulfate  IVPB 2 Gram(s) IV Intermittent Once  magnesium sulfate  IVPB 2 Gram(s) IV Intermittent once  midazolam  1 mG/mL Injectable (Rx Quick Charge) 2 milliGRAM(s) IV Push   potassium chloride   Powder 40 milliEquivalent(s) Oral Once  potassium chloride   Powder 40 milliEquivalent(s) Oral two times a day  potassium chloride   Powder 40 milliEquivalent(s) Oral every 2 hours  potassium chloride  20 mEq/100 mL IVPB 20 milliEquivalent(s) IV Intermittent once  potassium chloride  20 mEq/100 mL IVPB 20 milliEquivalent(s) IV Intermittent every 2 hours  potassium chloride  20 mEq/100 mL IVPB 20 milliEquivalent(s) IV Intermittent once  potassium chloride  20 mEq/100 mL IVPB 20 milliEquivalent(s) IV Intermittent every 2 hours  potassium chloride  20 mEq/100 mL IVPB 20 milliEquivalent(s) IV Intermittent every 2 hours  potassium chloride  20 mEq/100 mL IVPB 20 milliEquivalent(s) IV Intermittent every 2 hours

## 2024-11-17 NOTE — PROGRESS NOTE ADULT - SUBJECTIVE AND OBJECTIVE BOX
Patient is a 89y old  Female who presents with a chief complaint of low potassium and abd pain, constipation (16 Nov 2024 21:00)      OVERNIGHT EVENTS: no major events reported, feels fine, no abd pain     SUBJECTIVE / INTERVAL HPI: Patient seen and examined at bedside.     VITAL SIGNS:  Vital Signs Last 24 Hrs  T(C): 37 (17 Nov 2024 04:54), Max: 37 (17 Nov 2024 04:54)  T(F): 98.6 (17 Nov 2024 04:54), Max: 98.6 (17 Nov 2024 04:54)  HR: 70 (17 Nov 2024 04:54) (60 - 82)  BP: 133/83 (17 Nov 2024 04:54) (130/60 - 135/79)  BP(mean): 100 (17 Nov 2024 04:54) (100 - 100)  RR: 19 (17 Nov 2024 04:54) (18 - 19)  SpO2: 96% (17 Nov 2024 04:54) (96% - 96%)    Parameters below as of 17 Nov 2024 04:54  Patient On (Oxygen Delivery Method): room air        PHYSICAL EXAM:    General: old lady not in distress   HEENT: NC/AT; PERRL, clear conjunctiva  Neck: supple  Cardiovascular: +S1/S2; RRR  Respiratory: CTA b/l; no W/R/R  Gastrointestinal: soft, non tender, mild abd distention; +BSx4  Extremities: WWP; 2+ peripheral pulses; no edema   Neurological: AAOx3; no focal deficits    MEDICATIONS:  MEDICATIONS  (STANDING):  amLODIPine   Tablet 5 milliGRAM(s) Oral daily  aspirin  chewable 81 milliGRAM(s) Oral daily  bisacodyl 10 milliGRAM(s) Oral at bedtime  carvedilol 12.5 milliGRAM(s) Oral every 12 hours  chlorhexidine 2% Cloths 1 Application(s) Topical <User Schedule>  enoxaparin Injectable 40 milliGRAM(s) SubCutaneous every 24 hours  losartan 25 milliGRAM(s) Oral daily  magnesium oxide 400 milliGRAM(s) Oral two times a day with meals  memantine 5 milliGRAM(s) Oral daily  polyethylene glycol 3350 17 Gram(s) Oral every 12 hours  potassium chloride   Powder 20 milliEquivalent(s) Oral daily  senna 2 Tablet(s) Oral daily  zinc oxide 20% Ointment 1 Application(s) Topical two times a day    MEDICATIONS  (PRN):  ondansetron Injectable 4 milliGRAM(s) IV Push once PRN Nausea and/or Vomiting      ALLERGIES:  Allergies    codeine (Unknown)  penicillin (Unknown)    Intolerances        LABS:                        10.6   7.20  )-----------( 291      ( 17 Nov 2024 07:47 )             33.1     11-17    139  |  103  |  8[L]  ----------------------------<  85  3.5   |  24  |  0.6[L]    Ca    10.0      17 Nov 2024 07:47  Mg     2.0     11-17    TPro  5.8[L]  /  Alb  3.1[L]  /  TBili  0.5  /  DBili  x   /  AST  18  /  ALT  6   /  AlkPhos  91  11-17      Urinalysis Basic - ( 17 Nov 2024 07:47 )    Color: x / Appearance: x / SG: x / pH: x  Gluc: 85 mg/dL / Ketone: x  / Bili: x / Urobili: x   Blood: x / Protein: x / Nitrite: x   Leuk Esterase: x / RBC: x / WBC x   Sq Epi: x / Non Sq Epi: x / Bacteria: x      CAPILLARY BLOOD GLUCOSE          RADIOLOGY & ADDITIONAL TESTS: Reviewed.    ASSESSMENT:    PLAN:

## 2024-11-17 NOTE — PROGRESS NOTE ADULT - ASSESSMENT
89 yo F with hx of HTN, HLD, CHB s/p DC PPM s/p BiV ICD upgrade, Chronic A.fib (not on AC due to recurrent fall) and HFpEF (G3DD) presents to ED for generalized abdominal pain, constipation and hypokalemia.       # abd distention, Chronic intestinal pseudo-obstruction  # Acute Sigmoid Volvulus s/p sigmoidoscopy decompression   # Hypokalemia / Hypomagnesemia   #Acute Cystitis ruled out   #Chronic A.fib mpt on AC / HFimpEF s/p AICD BiV   # HTN  HLD       PLANs:    - symptoms improved s/p sigmoidoscopy decompression and rectal tube   - was tolerating diet and passed BMs till 11/16, had abd distention and KUB showed increase distention  - s/p rectal tube 11/16   - GI aware, serial KUB and abd exam, might need  full colonoscopy next week  - c/w senna, miralax, dulcolax, Enema PRN  - keep K >4 and Mg > 2, on PO supplements   - passed TOV  - c/w ASA/ Amlodipine / Losartan / atorvastatin / Coreg dec 12.5 mg w holding parameter    - DVT ppx lovenox   - DNR/DNI  Pending: clinical improvement of abd distention, removal of rectal tube  Dispo: STR, nephew updated at the bedside 11/15

## 2024-11-17 NOTE — OCCUPATIONAL THERAPY INITIAL EVALUATION ADULT - AMBULATORY DEVICES NEEDED
RW and w/c when outside, pt stated that when she goes to Dr appointment w/ home attendant she uses w/c/yes

## 2024-11-17 NOTE — PROGRESS NOTE ADULT - ASSESSMENT
Assessment and Plan  Case of a 89-year-old female patient with history of hypertension, dyslipidemia, sinus bradycardia status post permanent pacemaker several years ago, congestive heart failure status post AICD, possible history of DVT status post IVC filter on Eliquis, and recent left hip fracture status post replacement around 10 days ago at [email or web address] who was brought to the ED on November 7 for evaluation of hypokalemia in the setting of 2-day history of colonic ileus on KUB at the nursing home.  She was found to have hypokalemia and evidence of questionable twisting at the sigmoid suggestive of volvulus with moderate gaseous distention of the colon.  We are contacted in the setting of concern of sigmoid volvulus.      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 with moderate gaseous distention of the colon/pseudoobstruction s/p rectal tube replacement 11/16  Recent moderate colonic ileus in setting of recent hip replacement and hypokalemia (on lasix)  Chronic constipation  Chronic anemia with no overt GI bleed  * Was admitted recently to [facility] [email or web address] for left hip fracture status post left hip replacement around 10 days ago  * Reports chronic constipation with 2 days of left lower quadrant discomfort (nonradiating, mild around 2/10 in intensity); last flatus 2 days ago; no nausea or vomiting or fever or chills; reports intentional subjective weight loss; no melena or hematochezia; no dysphagia.  She was evaluated at the nursing home: Status post KUB on November 5 in November 6 showing stable moderate colonic ileus.  Status post blood work showing potassium of 2.5 at the nursing home on November 5    * Currently hemodynamically stable  * Labs: WBC 6.78, Hb 10.1, Plt 272, Na 139, K 3.4, BUN 8, Cr 0.6 on 11/16 -> WBC 7.2, Hb 10.6, Plt 291, Na 139, K 3.5, BUN 8, Cr 0.6 on 11/17  * INR 1.56 ; Lactate 0.7 on 11/07 -> 0.9 on 11/16  * CT abdomen pelvis with IV contrast revealed questionable twisting of the sigmoid mesentery suggestive of volvulus, moderate gaseous distention of the colon with liquid stool in the rectum, right lower quadrant calcified 2.6 cm density  * No previous EGD or colonoscopy per patient and nephew  * No family history of GI malignancies  * S/P Flexible Sigmoidoscopy (11/7) : the scope was traversed through the anus up to the sigmoid colon. No twisting was noted. The colon was noted to be markedly distended with stool residues throughout. Aggressive suctioning was performed and successful decompression was achieved. At the end of the exam, the abdomen was very soft and non distended  * S/P rectal tube placement on 11/07   * Repeat KUB 11/09 resolved distention  * Repeat KUB 11/10-11/11: increased distention   * S/P rectal tube placement on 11/11  * Repeat KUB 11/12-11/13: decompressed -> rectal tube removed 11/14  * Repeat KUB 11/15-11/16: increasing gaseous distention  * S/P rectal tube placement on 11/16 -> soft abdomen; non distended  * Repeat KUB unchanged on 11/17    RECOMMENDATIONS  - In the setting of unchanged KUB despite very benign exam (soft and non distended abdomen) on 11/17, will keep rectal tube in place (abdomen was more distended prior to rectal tube placement on 11/16 and patient had pressure-like pain on 11/16 both of which are resolved on 11/17)  - Will tentatively schedule patient for a full colonoscopy on 11/19 Tuesday  - Status post rectal tube re-placement on 11/16 -> monitor output  - Repeat KUB daily  - Monitor bowel movements and flatus: avoid constipation. Would continue bowel regimen (senna 2 tabs, miralax 17g BID, dulcolax 10mg QD, magnesium oxide 400mg BID)  - Recommend surgery follow up   - Repeat lactic acid noted  - Recommend serial abdominal exams  - Recommend PT/OT and incentive spirometry  - Monitor electrolytes and replete as indicated  - Avoid calcium channel blocker, sedatives, opioids, and anticholinergics  - Trend CBC and keep active type and screen.  In the setting of stable hemoglobin and absence of overt bleeding, would continue necessity anticoagulation if deemed necessary and if benefits outweigh risks  - Recommend outpatient follow-up at the GI MAC clinic in the setting of chronic anemia for further workup if within goals of care.  Discussed with the healthcare proxy but withhold of screening colonoscopy for now      Thank you for your consult.  - Please note that plan was communicated with medical team.   - Please reach GI on [phone] during weekdays till 5pm.  - Please call the GI service line after 5pm on Weekdays and anytime on Weekends: [phone].      Kamar Mercado MD  PGY - 5 Gastroenterology Fellow   [facility]   Assessment and Plan  Case of a 89-year-old female patient with history of hypertension, dyslipidemia, sinus bradycardia status post permanent pacemaker several years ago, congestive heart failure status post AICD, possible history of DVT status post IVC filter on Eliquis, and recent left hip fracture status post replacement around 10 days ago at [facility] who was brought to the ED on November 7 for evaluation of hypokalemia in the setting of 2-day history of colonic ileus on KUB at the nursing home.  She was found to have hypokalemia and evidence of questionable twisting at the sigmoid suggestive of volvulus with moderate gaseous distention of the colon.  We are contacted in the setting of concern of sigmoid volvulus.      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 with moderate gaseous distention of the colon/pseudoobstruction s/p rectal tube replacement 11/16  Recent moderate colonic ileus in setting of recent hip replacement and hypokalemia (on lasix)  Chronic constipation  Chronic anemia with no overt GI bleed  * Was admitted recently to [facility] for left hip fracture status post left hip replacement around 10 days ago  * Reports chronic constipation with 2 days of left lower quadrant discomfort (nonradiating, mild around 2/10 in intensity); last flatus 2 days ago; no nausea or vomiting or fever or chills; reports intentional subjective weight loss; no melena or hematochezia; no dysphagia.  She was evaluated at the nursing home: Status post KUB on November 5 in November 6 showing stable moderate colonic ileus.  Status post blood work showing potassium of 2.5 at the nursing home on November 5    * Currently hemodynamically stable  * Labs: WBC 6.78, Hb 10.1, Plt 272, Na 139, K 3.4, BUN 8, Cr 0.6 on 11/16 -> WBC 7.2, Hb 10.6, Plt 291, Na 139, K 3.5, BUN 8, Cr 0.6 on 11/17  * INR 1.56 ; Lactate 0.7 on 11/07 -> 0.9 on 11/16  * CT abdomen pelvis with IV contrast revealed questionable twisting of the sigmoid mesentery suggestive of volvulus, moderate gaseous distention of the colon with liquid stool in the rectum, right lower quadrant calcified 2.6 cm density  * No previous EGD or colonoscopy per patient and nephew  * No family history of GI malignancies  * S/P Flexible Sigmoidoscopy (11/7) : the scope was traversed through the anus up to the sigmoid colon. No twisting was noted. The colon was noted to be markedly distended with stool residues throughout. Aggressive suctioning was performed and successful decompression was achieved. At the end of the exam, the abdomen was very soft and non distended  * S/P rectal tube placement on 11/07   * Repeat KUB 11/09 resolved distention  * Repeat KUB 11/10-11/11: increased distention   * S/P rectal tube placement on 11/11  * Repeat KUB 11/12-11/13: decompressed -> rectal tube removed 11/14  * Repeat KUB 11/15-11/16: increasing gaseous distention  * S/P rectal tube placement on 11/16 -> soft abdomen; non distended  * Repeat KUB unchanged on 11/17    RECOMMENDATIONS  - In the setting of unchanged KUB despite very benign exam (soft and non distended abdomen) on 11/17, will keep rectal tube in place (abdomen was more distended prior to rectal tube placement on 11/16 and patient had pressure-like pain on 11/16 both of which are resolved on 11/17)  - Will tentatively schedule patient for a full colonoscopy on 11/19 Tuesday. Discussed risks and benefits of procedure with patient and cousing HCP over phone (Ms Pineda ). Since patient might not be cooperative with drinking the 4L of golytely, family will try to convince her to place NG tube on 11/18 for prep administration  - Status post rectal tube re-placement on 11/16 -> monitor output  - Repeat KUB daily  - Monitor bowel movements and flatus: avoid constipation. Would continue bowel regimen (senna 2 tabs, miralax 17g BID, dulcolax 10mg QD, magnesium oxide 400mg BID)  - Recommend surgery follow up   - Repeat lactic acid noted  - Recommend serial abdominal exams  - Recommend PT/OT and incentive spirometry  - Monitor electrolytes and replete as indicated  - Avoid calcium channel blocker, sedatives, opioids, and anticholinergics  - Trend CBC and keep active type and screen.  In the setting of stable hemoglobin and absence of overt bleeding, would continue necessity anticoagulation if deemed necessary and if benefits outweigh risks  - Recommend outpatient follow-up at the GI MAC clinic in the setting of chronic anemia for further workup if within goals of care.  Discussed with the healthcare proxy but withhold of screening colonoscopy for now      Thank you for your consult.  - Please note that plan was communicated with medical team.   - Please reach GI on [phone] during weekdays till 5pm.  - Please call the GI service line after 5pm on Weekdays and anytime on Weekends: [phone].      Kamar Mercado MD  PGY - 5 Gastroenterology Fellow   [facility]

## 2024-11-17 NOTE — OCCUPATIONAL THERAPY INITIAL EVALUATION ADULT - LEVEL OF INDEPENDENCE: GROOMING, OT EVAL
performed in bed @ EOB and table in front of pt w/ basin on the table/minimum assist (75% patients effort)

## 2024-11-17 NOTE — OCCUPATIONAL THERAPY INITIAL EVALUATION ADULT - PERTINENT HX OF CURRENT PROBLEM, REHAB EVAL
89-year-old femalel admitted ow potassium and abd pain, constipation patient with history of hypertension, dyslipidemia, sinus bradycardia status post permanent pacemaker several years ago, congestive heart failure status post AICD, possible history of DVT status post IVC filter on Eliquis, and recent left hip fracture status post replacement around 10 days ago at [facility] who was brought to the ED on November 7 for evaluation of hypokalemia in the setting of 2-day history of colonic ileus on KUB at the nursing home.  She was found to have hypokalemia and evidence of questionable twisting at the sigmoid suggestive of volvulus with moderate gaseous distention of the colon.  Patient being admitted for urgent surgical management. CONFIRM MED RECC IN AM

## 2024-11-18 LAB
ALBUMIN SERPL ELPH-MCNC: 3.2 G/DL — LOW (ref 3.5–5.2)
ALP SERPL-CCNC: 96 U/L — SIGNIFICANT CHANGE UP (ref 30–115)
ALT FLD-CCNC: 7 U/L — SIGNIFICANT CHANGE UP (ref 0–41)
ANION GAP SERPL CALC-SCNC: 15 MMOL/L — HIGH (ref 7–14)
AST SERPL-CCNC: 29 U/L — SIGNIFICANT CHANGE UP (ref 0–41)
BASOPHILS # BLD AUTO: 0.05 K/UL — SIGNIFICANT CHANGE UP (ref 0–0.2)
BASOPHILS NFR BLD AUTO: 0.8 % — SIGNIFICANT CHANGE UP (ref 0–1)
BILIRUB SERPL-MCNC: 0.5 MG/DL — SIGNIFICANT CHANGE UP (ref 0.2–1.2)
BUN SERPL-MCNC: 7 MG/DL — LOW (ref 10–20)
CALCIUM SERPL-MCNC: 10.3 MG/DL — SIGNIFICANT CHANGE UP (ref 8.4–10.5)
CHLORIDE SERPL-SCNC: 103 MMOL/L — SIGNIFICANT CHANGE UP (ref 98–110)
CO2 SERPL-SCNC: 20 MMOL/L — SIGNIFICANT CHANGE UP (ref 17–32)
CREAT SERPL-MCNC: 0.7 MG/DL — SIGNIFICANT CHANGE UP (ref 0.7–1.5)
EGFR: 80 ML/MIN/1.73M2 — SIGNIFICANT CHANGE UP
EOSINOPHIL # BLD AUTO: 0.21 K/UL — SIGNIFICANT CHANGE UP (ref 0–0.7)
EOSINOPHIL NFR BLD AUTO: 3.3 % — SIGNIFICANT CHANGE UP (ref 0–8)
GLUCOSE SERPL-MCNC: 85 MG/DL — SIGNIFICANT CHANGE UP (ref 70–99)
HCT VFR BLD CALC: 33.2 % — LOW (ref 37–47)
HGB BLD-MCNC: 10.8 G/DL — LOW (ref 12–16)
IMM GRANULOCYTES NFR BLD AUTO: 0.3 % — SIGNIFICANT CHANGE UP (ref 0.1–0.3)
LYMPHOCYTES # BLD AUTO: 0.93 K/UL — LOW (ref 1.2–3.4)
LYMPHOCYTES # BLD AUTO: 14.8 % — LOW (ref 20.5–51.1)
MAGNESIUM SERPL-MCNC: 2 MG/DL — SIGNIFICANT CHANGE UP (ref 1.8–2.4)
MCHC RBC-ENTMCNC: 29.8 PG — SIGNIFICANT CHANGE UP (ref 27–31)
MCHC RBC-ENTMCNC: 32.5 G/DL — SIGNIFICANT CHANGE UP (ref 32–37)
MCV RBC AUTO: 91.5 FL — SIGNIFICANT CHANGE UP (ref 81–99)
MONOCYTES # BLD AUTO: 0.57 K/UL — SIGNIFICANT CHANGE UP (ref 0.1–0.6)
MONOCYTES NFR BLD AUTO: 9 % — SIGNIFICANT CHANGE UP (ref 1.7–9.3)
NEUTROPHILS # BLD AUTO: 4.52 K/UL — SIGNIFICANT CHANGE UP (ref 1.4–6.5)
NEUTROPHILS NFR BLD AUTO: 71.8 % — SIGNIFICANT CHANGE UP (ref 42.2–75.2)
NRBC # BLD: 0 /100 WBCS — SIGNIFICANT CHANGE UP (ref 0–0)
PLATELET # BLD AUTO: 210 K/UL — SIGNIFICANT CHANGE UP (ref 130–400)
PMV BLD: 12.1 FL — HIGH (ref 7.4–10.4)
POTASSIUM SERPL-MCNC: 3.9 MMOL/L — SIGNIFICANT CHANGE UP (ref 3.5–5)
POTASSIUM SERPL-SCNC: 3.9 MMOL/L — SIGNIFICANT CHANGE UP (ref 3.5–5)
PROT SERPL-MCNC: 6.1 G/DL — SIGNIFICANT CHANGE UP (ref 6–8)
RBC # BLD: 3.63 M/UL — LOW (ref 4.2–5.4)
RBC # FLD: 13.4 % — SIGNIFICANT CHANGE UP (ref 11.5–14.5)
SODIUM SERPL-SCNC: 138 MMOL/L — SIGNIFICANT CHANGE UP (ref 135–146)
WBC # BLD: 6.3 K/UL — SIGNIFICANT CHANGE UP (ref 4.8–10.8)
WBC # FLD AUTO: 6.3 K/UL — SIGNIFICANT CHANGE UP (ref 4.8–10.8)

## 2024-11-18 PROCEDURE — 74018 RADEX ABDOMEN 1 VIEW: CPT | Mod: 26,77

## 2024-11-18 PROCEDURE — 74018 RADEX ABDOMEN 1 VIEW: CPT | Mod: 26

## 2024-11-18 PROCEDURE — 99232 SBSQ HOSP IP/OBS MODERATE 35: CPT

## 2024-11-18 RX ADMIN — Medication 400 MILLIGRAM(S): at 08:12

## 2024-11-18 RX ADMIN — CHLORHEXIDINE GLUCONATE 1 APPLICATION(S): 1.2 RINSE ORAL at 05:41

## 2024-11-18 RX ADMIN — POTASSIUM CHLORIDE 20 MILLIEQUIVALENT(S): 600 TABLET, EXTENDED RELEASE ORAL at 11:46

## 2024-11-18 RX ADMIN — LOSARTAN POTASSIUM 25 MILLIGRAM(S): 100 TABLET, FILM COATED ORAL at 05:40

## 2024-11-18 RX ADMIN — MEMANTINE HYDROCHLORIDE 5 MILLIGRAM(S): 14 CAPSULE, EXTENDED RELEASE ORAL at 11:46

## 2024-11-18 RX ADMIN — Medication 81 MILLIGRAM(S): at 11:45

## 2024-11-18 RX ADMIN — POLYETHYLENE GLYCOL 3350 17 GRAM(S): 17 POWDER, FOR SOLUTION ORAL at 05:41

## 2024-11-18 RX ADMIN — Medication 1 APPLICATION(S): at 05:41

## 2024-11-18 RX ADMIN — ENOXAPARIN SODIUM 40 MILLIGRAM(S): 30 INJECTION SUBCUTANEOUS at 05:41

## 2024-11-18 RX ADMIN — CARVEDILOL 12.5 MILLIGRAM(S): 25 TABLET, FILM COATED ORAL at 17:38

## 2024-11-18 RX ADMIN — Medication 10 MILLIGRAM(S): at 21:54

## 2024-11-18 RX ADMIN — AMLODIPINE BESYLATE 5 MILLIGRAM(S): 10 TABLET ORAL at 05:40

## 2024-11-18 RX ADMIN — Medication 1 APPLICATION(S): at 17:36

## 2024-11-18 RX ADMIN — Medication 400 MILLIGRAM(S): at 17:36

## 2024-11-18 RX ADMIN — Medication 2 TABLET(S): at 11:45

## 2024-11-18 NOTE — PROGRESS NOTE ADULT - SUBJECTIVE AND OBJECTIVE BOX
Patient is a 89y old  Female who presents with a chief complaint of low potassium and abd pain, constipation (17 Nov 2024 13:59)      OVERNIGHT EVENTS: rectal dialoged s/p re placement     SUBJECTIVE / INTERVAL HPI: Patient seen and examined at bedside.     VITAL SIGNS:  Vital Signs Last 24 Hrs  T(C): 36.7 (18 Nov 2024 04:45), Max: 36.7 (17 Nov 2024 14:06)  T(F): 98.1 (18 Nov 2024 04:45), Max: 98.1 (18 Nov 2024 04:45)  HR: 60 (18 Nov 2024 04:45) (60 - 62)  BP: 138/73 (18 Nov 2024 04:45) (135/69 - 146/83)  BP(mean): 95 (18 Nov 2024 04:45) (95 - 95)  RR: 19 (18 Nov 2024 04:45) (18 - 19)  SpO2: 95% (18 Nov 2024 04:45) (95% - 96%)    Parameters below as of 18 Nov 2024 04:45  Patient On (Oxygen Delivery Method): room air        PHYSICAL EXAM:    General: old lady not in distress   HEENT: NC/AT; PERRL, clear conjunctiva  Neck: supple  Cardiovascular: +S1/S2; RRR  Respiratory: CTA b/l; no W/R/R  Gastrointestinal: soft, non tender, mild abd distention; +BSx4  Extremities: WWP; 2+ peripheral pulses; no edema   Neurological: AAOx3; no focal deficits    MEDICATIONS:  MEDICATIONS  (STANDING):  amLODIPine   Tablet 5 milliGRAM(s) Oral daily  aspirin  chewable 81 milliGRAM(s) Oral daily  bisacodyl 10 milliGRAM(s) Oral at bedtime  carvedilol 12.5 milliGRAM(s) Oral every 12 hours  chlorhexidine 2% Cloths 1 Application(s) Topical <User Schedule>  enoxaparin Injectable 40 milliGRAM(s) SubCutaneous every 24 hours  losartan 25 milliGRAM(s) Oral daily  magnesium oxide 400 milliGRAM(s) Oral two times a day with meals  memantine 5 milliGRAM(s) Oral daily  polyethylene glycol 3350 17 Gram(s) Oral every 12 hours  potassium chloride   Powder 20 milliEquivalent(s) Oral daily  senna 2 Tablet(s) Oral daily  zinc oxide 20% Ointment 1 Application(s) Topical two times a day    MEDICATIONS  (PRN):  ondansetron Injectable 4 milliGRAM(s) IV Push once PRN Nausea and/or Vomiting      ALLERGIES:  Allergies    codeine (Unknown)  penicillin (Unknown)    Intolerances        LABS:                        10.8   6.30  )-----------( 210      ( 18 Nov 2024 06:36 )             33.2     11-18    138  |  103  |  7[L]  ----------------------------<  85  3.9   |  20  |  0.7    Ca    10.3      18 Nov 2024 06:36  Mg     2.0     11-18    TPro  6.1  /  Alb  3.2[L]  /  TBili  0.5  /  DBili  x   /  AST  29  /  ALT  7   /  AlkPhos  96  11-18      Urinalysis Basic - ( 18 Nov 2024 06:36 )    Color: x / Appearance: x / SG: x / pH: x  Gluc: 85 mg/dL / Ketone: x  / Bili: x / Urobili: x   Blood: x / Protein: x / Nitrite: x   Leuk Esterase: x / RBC: x / WBC x   Sq Epi: x / Non Sq Epi: x / Bacteria: x      CAPILLARY BLOOD GLUCOSE          RADIOLOGY & ADDITIONAL TESTS: Reviewed.

## 2024-11-18 NOTE — PROGRESS NOTE ADULT - ASSESSMENT
89 yo F with hx of HTN, HLD, CHB s/p DC PPM s/p BiV ICD upgrade, Chronic A.fib (not on AC due to recurrent fall) and HFpEF (G3DD) presents to ED for generalized abdominal pain, constipation and hypokalemia.       # abd distention, Chronic intestinal pseudo-obstruction  # Acute Sigmoid Volvulus s/p sigmoidoscopy decompression   # Hypokalemia / Hypomagnesemia   #Acute Cystitis ruled out   #Chronic A.fib mpt on AC / HFimpEF s/p AICD BiV   # HTN  HLD       PLANs:    - symptoms improved s/p sigmoidoscopy decompression and rectal tube   - was tolerating diet and passed BMs till 11/16, had abd distention and KUB showed increase distention  - s/p rectal tube 11/16   - GI aware, serial KUB and abd exam, might need  colonoscopy pt un able to do the prep might do it w NGT   - c/w senna, miralax, dulcolax,   - keep K >4 and Mg > 2, on PO supplements   - c/w ASA/ Amlodipine / Losartan / atorvastatin / Coreg dec 12.5 mg w holding parameter    - DVT ppx lovenox   - DNR/DNI  Pending: clinical improvement of abd distention, possbile c-scope. removal of rectal tube  Dispo: STR, nephew updated at the bedside 11/15 89 yo F with hx of HTN, HLD, CHB s/p DC PPM s/p BiV ICD upgrade, Chronic A.fib (not on AC due to recurrent fall) and HFpEF (G3DD) presents to ED for generalized abdominal pain, constipation and hypokalemia.       # abd distention, Chronic intestinal pseudo-obstruction  # Acute Sigmoid Volvulus s/p sigmoidoscopy decompression   # Hypokalemia / Hypomagnesemia   #Acute Cystitis ruled out   #Chronic A.fib mpt on AC / HFimpEF s/p AICD BiV   # HTN  HLD       PLANs:    - symptoms improved s/p sigmoidoscopy decompression and rectal tube   - was tolerating diet and passed BMs till 11/16, had abd distention and KUB showed increase distention  - s/p rectal tube 11/16   - GI aware, serial KUB and abd exam, might need  colonoscopy pt un able to do the prep might do it w NGT   - c/w senna, miralax, dulcolax,   - keep K >4 and Mg > 2, on PO supplements   - c/w ASA/ Amlodipine / Losartan / atorvastatin / Coreg dec 12.5 mg w holding parameter    - DVT ppx lovenox   - DNR/DNI  - nephew updated at the bedside on 11/18   Pending: clinical improvement of abd distention, possible c-scope. removal of rectal tube  Dispo: STR, nephew updated at the bedside 11/15

## 2024-11-18 NOTE — PROGRESS NOTE ADULT - ASSESSMENT
Assessment and Plan  Case of a 89-year-old female patient with history of hypertension, dyslipidemia, sinus bradycardia status post permanent pacemaker several years ago, congestive heart failure status post AICD, possible history of DVT status post IVC filter on Eliquis, and recent left hip fracture status post replacement around 10 days ago at [facility] who was brought to the ED on November 7 for evaluation of hypokalemia in the setting of 2-day history of colonic ileus on KUB at the nursing home.  She was found to have hypokalemia and evidence of questionable twisting at the sigmoid suggestive of volvulus with moderate gaseous distention of the colon.  We are contacted in the setting of concern of sigmoid volvulus.      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 for left hip fracture status post left hip replacement around 10 days ago  * Reports chronic constipation with 2 days of left lower quadrant discomfort (nonradiating, mild around 2/10 in intensity); last flatus 2 days ago; no nausea or vomiting or fever or chills; reports intentional subjective weight loss; no melena or hematochezia; no dysphagia.  She was evaluated at the nursing home: Status post KUB on November 5 in November 6 showing stable moderate colonic ileus.  Status post blood work showing potassium of 2.5 at the nursing home on November 5    * Currently hemodynamically stable  * Labs: WBC 6.78, Hb 10.1, Plt 272, Na 139, K 3.4, BUN 8, Cr 0.6 on 11/16 -> WBC 7.2, Hb 10.6, Plt 291, Na 139, K 3.5, BUN 8, Cr 0.6 on 11/17 -> WBC 6.3, Hb 10.8, Plt 210, Na 138, K 3.9, BUN 7,Cr 0.7 11/18  * INR 1.56 ; Lactate 0.7 on 11/07 -> 0.9 on 11/16  * CT abdomen pelvis with IV contrast revealed questionable twisting of the sigmoid mesentery suggestive of volvulus, moderate gaseous distention of the colon with liquid stool in the rectum, right lower quadrant calcified 2.6 cm density  * No previous EGD or colonoscopy per patient and nephew  * No family history of GI malignancies  * S/P Flexible Sigmoidoscopy (11/7) : the scope was traversed through the anus up to the sigmoid colon. No twisting was noted. The colon was noted to be markedly distended with stool residues throughout. Aggressive suctioning was performed and successful decompression was achieved. At the end of the exam, the abdomen was very soft and non distended  * S/P rectal tube placement on 11/07   * Repeat KUB 11/09 resolved distention  * Repeat KUB 11/10-11/11: increased distention   * S/P rectal tube placement on 11/11  * Repeat KUB 11/12-11/13: decompressed -> rectal tube removed 11/14  * Repeat KUB 11/15-11/16: increasing gaseous distention  * S/P rectal tube placement on 11/16 -> soft abdomen; non distended  * Repeat KUB unchanged on 11/17    RECOMMENDATIONS  - In the setting of increased distention on KUB post rectal tube dislodgement, a rectal tube was replaced at 6:30 AM (drained 75 cc of yellow stools instantly; soft and non distended abdomen after tube placement) on 11/18. Ordered STAT KUB at 12:35 PM to follow up. Repeat KUB daily  - Discussed risks and benefits of procedure with patient and cousin HCP over phone (Ms Pineda ). Since patient might not be cooperative with drinking the 4L of golytely, family will try to convince her to place NG tube on 11/18 for prep administration. Pending final decision, will schedule patient for colonoscopy on 11/19. If patient agreeable, administer 4 L golytely today and 20mg of Dulcolax at bedtime.   - Continue clear liquid diet in setting of soft abdomen and rectal tube drainage. Keep NPO after midnight. Check preop labs tonight: CBC, BMP, Mg, and INR  - Recommend surgery follow up   - Monitor rectal tube output: would continue bowel regimen (senna 2 tabs, miralax 17g BID, dulcolax 10mg QD, magnesium oxide 400mg BID)  - Monitor flatus  - Trend lactic acid   - Recommend serial abdominal exams  - Recommend PT/OT and incentive spirometry  - Monitor electrolytes and replete as indicated  - Avoid calcium channel blocker, sedatives, opioids, and anticholinergics  - Trend CBC and keep active type and screen.  In the setting of stable hemoglobin and absence of overt bleeding, would continue necessity anticoagulation if deemed necessary and if benefits outweigh risks  - Recommend outpatient follow-up at the GI MAC clinic in the setting of chronic anemia for further workup if within goals of care.  Discussed with the healthcare proxy but withhold of screening colonoscopy for now      Thank you for your consult.  - Please note that plan was communicated with medical team.   - Please reach GI on 1230 during weekdays till 5pm.  - Please call the GI service line after 5pm on Weekdays and anytime on Weekends: [phone].      Kamar Mercado MD  PGY - 5 Gastroenterology Fellow      Assessment and Plan  Case of a 89-year-old female patient with history of hypertension, dyslipidemia, sinus bradycardia status post permanent pacemaker several years ago, congestive heart failure status post AICD, possible history of DVT status post IVC filter on Eliquis, and recent left hip fracture status post replacement around 10 days ago at [facility] who was brought to the ED on November 7 for evaluation of hypokalemia in the setting of 2-day history of colonic ileus on KUB at the nursing home.  She was found to have hypokalemia and evidence of questionable twisting at the sigmoid suggestive of volvulus with moderate gaseous distention of the colon.  We are contacted in the setting of concern of sigmoid volvulus.      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 with moderate gaseous distention of the colon/pseudoobstruction s/p rectal tube replacement 11/16 and 11/18  Recent moderate colonic ileus in setting of recent left candida-arhtroplasty and hypokalemia (on lasix)  Chronic constipation  Chronic anemia with no overt GI bleed  * Was admitted recently to [facility] for left hip fracture status post left hip replacement around 10 days ago  * Reports chronic constipation with 2 days of left lower quadrant discomfort (nonradiating, mild around 2/10 in intensity); last flatus 2 days ago; no nausea or vomiting or fever or chills; reports intentional subjective weight loss; no melena or hematochezia; no dysphagia.  She was evaluated at the nursing home: Status post KUB on November 5 in November 6 showing stable moderate colonic ileus.  Status post blood work showing potassium of 2.5 at the nursing home on November 5    * Currently hemodynamically stable  * Labs: WBC 6.78, Hb 10.1, Plt 272, Na 139, K 3.4, BUN 8, Cr 0.6 on 11/16 -> WBC 7.2, Hb 10.6, Plt 291, Na 139, K 3.5, BUN 8, Cr 0.6 on 11/17 -> WBC 6.3, Hb 10.8, Plt 210, Na 138, K 3.9, BUN 7,Cr 0.7 11/18  * INR 1.56 ; Lactate 0.7 on 11/07 -> 0.9 on 11/16  * CT abdomen pelvis with IV contrast revealed questionable twisting of the sigmoid mesentery suggestive of volvulus, moderate gaseous distention of the colon with liquid stool in the rectum, right lower quadrant calcified 2.6 cm density  * No previous EGD or colonoscopy per patient and nephew  * No family history of GI malignancies  * S/P Flexible Sigmoidoscopy (11/7) : the scope was traversed through the anus up to the sigmoid colon. No twisting was noted. The colon was noted to be markedly distended with stool residues throughout. Aggressive suctioning was performed and successful decompression was achieved. At the end of the exam, the abdomen was very soft and non distended  * S/P rectal tube placement on 11/07   * Repeat KUB 11/09 resolved distention  * Repeat KUB 11/10-11/11: increased distention   * S/P rectal tube placement on 11/11  * Repeat KUB 11/12-11/13: decompressed -> rectal tube removed 11/14  * Repeat KUB 11/15-11/16: increasing gaseous distention  * S/P rectal tube placement on 11/16 -> soft abdomen; non distended  * Repeat KUB unchanged on 11/17    RECOMMENDATIONS  - In the setting of increased distention on KUB post rectal tube dislodgement, a rectal tube was replaced at 6:30 AM (drained 75 cc of yellow stools instantly; soft and non distended abdomen after tube placement) on 11/18. Ordered STAT KUB at 12:35 PM to follow up. Repeat KUB daily  - Discussed risks and benefits of procedure with patient and cousin HCP over phone (Ms Pineda ). Since patient might not be cooperative with drinking the 4L of golytely, family will try to convince her to place NG tube on 11/18 for prep administration. Pending final decision, will schedule patient for colonoscopy on 11/19 if optimized. If patient agreeable, administer 4 L golytely today and 20mg of Dulcolax at bedtime.   - Continue clear liquid diet in setting of soft abdomen and rectal tube drainage. Keep NPO after midnight. Check preop labs tonight: CBC, BMP, Mg, and INR  - Recommend surgery follow up   - Monitor rectal tube output: would continue bowel regimen (senna 2 tabs, miralax 17g BID, dulcolax 10mg QD, magnesium oxide 400mg BID)  - Monitor flatus  - Trend lactic acid   - Recommend serial abdominal exams  - Recommend PT/OT and incentive spirometry  - Monitor electrolytes and replete as indicated  - Avoid calcium channel blocker, sedatives, opioids, and anticholinergics  - Trend CBC and keep active type and screen.  In the setting of stable hemoglobin and absence of overt bleeding, would continue necessity anticoagulation if deemed necessary and if benefits outweigh risks  - Recommend outpatient follow-up at the GI MAC clinic in the setting of chronic anemia for further workup if within goals of care.  Discussed with the healthcare proxy but withhold of screening colonoscopy for now      Thank you for your consult.  - Please note that plan was communicated with medical team.   - Please reach GI on [phone] during weekdays till 5pm.  - Please call the GI service line after 5pm on Weekdays and anytime on Weekends: [phone].      Kamar Mercado MD  PGY - 5 Gastroenterology Fellow   [facility]

## 2024-11-18 NOTE — PROGRESS NOTE ADULT - SUBJECTIVE AND OBJECTIVE BOX
Patient is a 89y old  Female who presents with a chief complaint of low potassium and abd pain, constipation (17 Nov 2024 13:59)      OVERNIGHT EVENTS: rectal dialoged s/p re placement     SUBJECTIVE / INTERVAL HPI: Patient seen and examined at bedside.     VITAL SIGNS:  Vital Signs Last 24 Hrs  T(C): 36.7 (18 Nov 2024 04:45), Max: 36.7 (17 Nov 2024 14:06)  T(F): 98.1 (18 Nov 2024 04:45), Max: 98.1 (18 Nov 2024 04:45)  HR: 60 (18 Nov 2024 04:45) (60 - 62)  BP: 138/73 (18 Nov 2024 04:45) (135/69 - 146/83)  BP(mean): 95 (18 Nov 2024 04:45) (95 - 95)  RR: 19 (18 Nov 2024 04:45) (18 - 19)  SpO2: 95% (18 Nov 2024 04:45) (95% - 96%)    Parameters below as of 18 Nov 2024 04:45  Patient On (Oxygen Delivery Method): room air        PHYSICAL EXAM:    General: old lady not in distress   HEENT: NC/AT; PERRL, clear conjunctiva  Neck: supple  Cardiovascular: +S1/S2; RRR  Respiratory: CTA b/l; no W/R/R  Gastrointestinal: soft, non tender, mild abd distention; +BSx4  Extremities: WWP; 2+ peripheral pulses; no edema   Neurological: AAOx3; no focal deficits    MEDICATIONS:  MEDICATIONS  (STANDING):  amLODIPine   Tablet 5 milliGRAM(s) Oral daily  aspirin  chewable 81 milliGRAM(s) Oral daily  bisacodyl 10 milliGRAM(s) Oral at bedtime  carvedilol 12.5 milliGRAM(s) Oral every 12 hours  chlorhexidine 2% Cloths 1 Application(s) Topical <User Schedule>  enoxaparin Injectable 40 milliGRAM(s) SubCutaneous every 24 hours  losartan 25 milliGRAM(s) Oral daily  magnesium oxide 400 milliGRAM(s) Oral two times a day with meals  memantine 5 milliGRAM(s) Oral daily  polyethylene glycol 3350 17 Gram(s) Oral every 12 hours  potassium chloride   Powder 20 milliEquivalent(s) Oral daily  senna 2 Tablet(s) Oral daily  zinc oxide 20% Ointment 1 Application(s) Topical two times a day    MEDICATIONS  (PRN):  ondansetron Injectable 4 milliGRAM(s) IV Push once PRN Nausea and/or Vomiting      ALLERGIES:  Allergies    codeine (Unknown)  penicillin (Unknown)    Intolerances        LABS:                        10.8   6.30  )-----------( 210      ( 18 Nov 2024 06:36 )             33.2     11-18    138  |  103  |  7[L]  ----------------------------<  85  3.9   |  20  |  0.7    Ca    10.3      18 Nov 2024 06:36  Mg     2.0     11-18    TPro  6.1  /  Alb  3.2[L]  /  TBili  0.5  /  DBili  x   /  AST  29  /  ALT  7   /  AlkPhos  96  11-18      Urinalysis Basic - ( 18 Nov 2024 06:36 )    Color: x / Appearance: x / SG: x / pH: x  Gluc: 85 mg/dL / Ketone: x  / Bili: x / Urobili: x   Blood: x / Protein: x / Nitrite: x   Leuk Esterase: x / RBC: x / WBC x   Sq Epi: x / Non Sq Epi: x / Bacteria: x      CAPILLARY BLOOD GLUCOSE          RADIOLOGY & ADDITIONAL TESTS: Reviewed.    ASSESSMENT:    PLAN:

## 2024-11-18 NOTE — PROGRESS NOTE ADULT - SUBJECTIVE AND OBJECTIVE BOX
Gastroenterology Follow Up Note      Location: [facility] 3A 013 A ([facility] 3A)  Patient Name: RAMA MARMOLEJO  Age: 89y  Gender: Female      Chief Complaint  Patient is a 89y old Female who presents with a chief complaint of low potassium and abd pain, constipation (16 Nov 2024 12:41)  Primary diagnosis of Hypokalemia      Reason for Consult  Colonic Distention      Progress Note  This morning patient was seen and examined at bedside.    Today is hospital day 11d.  Patient had increased abdominal distention on 11/16 s/p rectal tube placement (draining brown stools on 11/17).  The rectal tube was dislodged on 11/18 at 4 AM and replaced at 06:30 AM.  She notes improvement/near resolution of pressure-like abdominal discomfort.  She has otherwise been tolerating PO diet and denied nausea or vomiting.      Vital Signs in the last 24 hours   T(C): 36.7 (18 Nov 2024 04:45), Max: 36.7 (17 Nov 2024 14:06)  T(F): 98.1 (18 Nov 2024 04:45), Max: 98.1 (18 Nov 2024 04:45)  HR: 60 (18 Nov 2024 04:45) (60 - 62)  BP: 138/73 (18 Nov 2024 04:45) (135/69 - 146/83)  BP(mean): 95 (18 Nov 2024 04:45) (95 - 95)  ABP: --  ABP(mean): --  RR: 19 (18 Nov 2024 04:45) (18 - 19)  SpO2: 95% (18 Nov 2024 04:45) (95% - 96%)    O2 Parameters below as of 18 Nov 2024 04:45  Patient On (Oxygen Delivery Method): room air        Physical Exam  * General Appearance: Alert, cooperative, interactive but hard of hearing, oriented to time, place, and person, in no acute distress  * Eyes: PERRL, conjunctiva/corneas clear, EOM's intact, fundi benign, both eyes  * Lungs: Good bilateral air entry, normal breath sounds (Clear to auscultation bilaterally, no audible wheezes, crackles, or rhonchi)  * Heart: Regular Rate and Rhythm, normal S1 and S2, no audible murmur, rub, or gallop  * Abdomen: Symmetric, softly moderately distended prior to rectal tube placement -> improved post rectal tube on 11/16, non-tender, bowel sounds active all four quadrants  * Rectal: s/p rectal tube replacement on 11/18 with drainage of yellow brown stools; air heard on insertion of finger      Investigations                          10.8   6.30  )-----------( 210      ( 18 Nov 2024 06:36 )             33.2     11-18    138  |  103  |  7[L]  ----------------------------<  85  3.9   |  20  |  0.7    Ca    10.3      18 Nov 2024 06:36  Mg     2.0     11-18    TPro  6.1  /  Alb  3.2[L]  /  TBili  0.5  /  DBili  x   /  AST  29  /  ALT  7   /  AlkPhos  96  11-18        LIVER FUNCTIONS - ( 18 Nov 2024 06:36 )  Alb: 3.2 g/dL / Pro: 6.1 g/dL / ALK PHOS: 96 U/L / ALT: 7 U/L / AST: 29 U/L / GGT: x               Urinalysis Basic - ( 18 Nov 2024 06:36 )    Color: x / Appearance: x / SG: x / pH: x  Gluc: 85 mg/dL / Ketone: x  / Bili: x / Urobili: x   Blood: x / Protein: x / Nitrite: x   Leuk Esterase: x / RBC: x / WBC x   Sq Epi: x / Non Sq Epi: x / Bacteria: x        Current Medications  Standing Medications  amLODIPine   Tablet 5 milliGRAM(s) Oral daily  aspirin  chewable 81 milliGRAM(s) Oral daily  bisacodyl 10 milliGRAM(s) Oral at bedtime  carvedilol 12.5 milliGRAM(s) Oral every 12 hours  chlorhexidine 2% Cloths 1 Application(s) Topical <User Schedule>  enoxaparin Injectable 40 milliGRAM(s) SubCutaneous every 24 hours  losartan 25 milliGRAM(s) Oral daily  magnesium oxide 400 milliGRAM(s) Oral two times a day with meals  memantine 5 milliGRAM(s) Oral daily  polyethylene glycol 3350 17 Gram(s) Oral every 12 hours  potassium chloride   Powder 20 milliEquivalent(s) Oral daily  senna 2 Tablet(s) Oral daily    PRN Medications  ondansetron Injectable 4 milliGRAM(s) IV Push once PRN Nausea and/or Vomiting    Singles Doses Administered  (ADM OVERRIDE) 1 each &lt;see task&gt; GiveOnce  (ADM OVERRIDE) 1 each &lt;see task&gt; GiveOnce  (ADM OVERRIDE) 1 each &lt;see task&gt; GiveOnce  (ADM OVERRIDE) 1 each &lt;see task&gt; GiveOnce  (ADM OVERRIDE) 2 each &lt;see task&gt; GiveOnce  (ADM OVERRIDE) 2 each &lt;see task&gt; GiveOnce  acetaminophen     Tablet .. 650 milliGRAM(s) Oral once PRN  amLODIPine   Tablet 5 milliGRAM(s) Oral once  lactated ringers Bolus 1000 milliLiter(s) IV Bolus once  magnesium sulfate  IVPB 2 Gram(s) IV Intermittent once  magnesium sulfate  IVPB 2 Gram(s) IV Intermittent Once  magnesium sulfate  IVPB 2 Gram(s) IV Intermittent once  midazolam  1 mG/mL Injectable (Rx Quick Charge) 2 milliGRAM(s) IV Push   potassium chloride   Powder 40 milliEquivalent(s) Oral Once  potassium chloride   Powder 40 milliEquivalent(s) Oral two times a day  potassium chloride   Powder 40 milliEquivalent(s) Oral every 2 hours  potassium chloride  20 mEq/100 mL IVPB 20 milliEquivalent(s) IV Intermittent once  potassium chloride  20 mEq/100 mL IVPB 20 milliEquivalent(s) IV Intermittent every 2 hours  potassium chloride  20 mEq/100 mL IVPB 20 milliEquivalent(s) IV Intermittent once  potassium chloride  20 mEq/100 mL IVPB 20 milliEquivalent(s) IV Intermittent every 2 hours  potassium chloride  20 mEq/100 mL IVPB 20 milliEquivalent(s) IV Intermittent every 2 hours  potassium chloride  20 mEq/100 mL IVPB 20 milliEquivalent(s) IV Intermittent every 2 hours

## 2024-11-18 NOTE — PROVIDER CONTACT NOTE (OTHER) - ASSESSMENT
patient is not having loose bm's. Patient also has hernandez cathter with seal not intact
patient had fluids with Potassium running early this am but it was dc'd at 1254 as per MD orders.
patient has no pain meds ordered
Patient abdomen soft and nontender, no s/s of pain and discomfort noted at this time.

## 2024-11-18 NOTE — PROGRESS NOTE ADULT - ASSESSMENT
89 yo F with hx of HTN, HLD, CHB s/p DC PPM s/p BiV ICD upgrade, Chronic A.fib (not on AC due to recurrent fall) and HFpEF (G3DD) presents to ED for generalized abdominal pain, constipation and hypokalemia.       # abd distention, Chronic intestinal pseudo-obstruction  # Acute Sigmoid Volvulus s/p sigmoidoscopy decompression   # Hypokalemia / Hypomagnesemia   #Acute Cystitis ruled out   #Chronic A.fib mpt on AC / HFimpEF s/p AICD BiV   # HTN  HLD       PLANs:    - symptoms improved s/p sigmoidoscopy decompression and rectal tube   - was tolerating diet and passed BMs till 11/16, had abd distention and KUB showed increase distention  - s/p rectal tube 11/16   - GI aware, serial KUB and abd exam, might need  colonoscopy pt un able to do the prep, after bedside discussion w/patient and nephew hcp Ernie, in agreement w/NGT for bowel prep for colonoscopy anticipated for tomorrow  - c/w senna, miralax, dulcolax,   - keep K >4 and Mg > 2, on PO supplements   - c/w ASA/ Amlodipine / Losartan / atorvastatin / Coreg dec 12.5 mg w holding parameter    - DVT ppx lovenox   - DNR/DNI  Pending: clinical improvement of abd distention, possbile c-scope. removal of rectal tube  Dispo: STR, nephew updated at the bedside 11/15   89 yo F with hx of HTN, HLD, CHB s/p DC PPM s/p BiV ICD upgrade, Chronic A.fib (not on AC due to recurrent fall) and HFpEF (G3DD) presents to ED for generalized abdominal pain, constipation and hypokalemia.       # abd distention, Chronic intestinal pseudo-obstruction  # Acute Sigmoid Volvulus s/p sigmoidoscopy decompression   # Hypokalemia / Hypomagnesemia   #Acute Cystitis ruled out   #Chronic A.fib mpt on AC / HFimpEF s/p AICD BiV   # HTN  HLD       PLANs:    - symptoms improved s/p sigmoidoscopy decompression and rectal tube   - was tolerating diet and passed BMs till 11/16, had abd distention and KUB showed increase distention  - s/p rectal tube 11/16   - GI aware, serial KUB and abd exam, might need  colonoscopy pt un able to do the prep, after bedside discussion w/patient and nephew hcp Ernie, in agreement w/NGT for bowel prep for colonoscopy anticipated for tomorrow  - c/w senna, miralax, dulcolax,   - keep K >4 and Mg > 2, on PO supplements   - c/w ASA/ Amlodipine / Losartan / atorvastatin / Coreg dec 12.5 mg w holding parameter    - DVT ppx lovenox   - DNR/DNI  Pending: clinical improvement of abd distention, possbile c-scope. removal of rectal tube  Dispo: STR, nephew updated at the bedside 11/18

## 2024-11-19 LAB
ANION GAP SERPL CALC-SCNC: 14 MMOL/L — SIGNIFICANT CHANGE UP (ref 7–14)
BUN SERPL-MCNC: 8 MG/DL — LOW (ref 10–20)
CALCIUM SERPL-MCNC: 10.4 MG/DL — SIGNIFICANT CHANGE UP (ref 8.4–10.5)
CHLORIDE SERPL-SCNC: 102 MMOL/L — SIGNIFICANT CHANGE UP (ref 98–110)
CO2 SERPL-SCNC: 22 MMOL/L — SIGNIFICANT CHANGE UP (ref 17–32)
CREAT SERPL-MCNC: 0.5 MG/DL — LOW (ref 0.7–1.5)
EGFR: 87 ML/MIN/1.73M2 — SIGNIFICANT CHANGE UP
GLUCOSE SERPL-MCNC: 82 MG/DL — SIGNIFICANT CHANGE UP (ref 70–99)
HCT VFR BLD CALC: 33.2 % — LOW (ref 37–47)
HGB BLD-MCNC: 11 G/DL — LOW (ref 12–16)
MAGNESIUM SERPL-MCNC: 2.1 MG/DL — SIGNIFICANT CHANGE UP (ref 1.8–2.4)
MCHC RBC-ENTMCNC: 29.6 PG — SIGNIFICANT CHANGE UP (ref 27–31)
MCHC RBC-ENTMCNC: 33.1 G/DL — SIGNIFICANT CHANGE UP (ref 32–37)
MCV RBC AUTO: 89.2 FL — SIGNIFICANT CHANGE UP (ref 81–99)
NRBC # BLD: 0 /100 WBCS — SIGNIFICANT CHANGE UP (ref 0–0)
PLATELET # BLD AUTO: 297 K/UL — SIGNIFICANT CHANGE UP (ref 130–400)
PMV BLD: 11.1 FL — HIGH (ref 7.4–10.4)
POTASSIUM SERPL-MCNC: 3 MMOL/L — LOW (ref 3.5–5)
POTASSIUM SERPL-SCNC: 3 MMOL/L — LOW (ref 3.5–5)
RBC # BLD: 3.72 M/UL — LOW (ref 4.2–5.4)
RBC # FLD: 13.4 % — SIGNIFICANT CHANGE UP (ref 11.5–14.5)
SODIUM SERPL-SCNC: 138 MMOL/L — SIGNIFICANT CHANGE UP (ref 135–146)
WBC # BLD: 7.63 K/UL — SIGNIFICANT CHANGE UP (ref 4.8–10.8)
WBC # FLD AUTO: 7.63 K/UL — SIGNIFICANT CHANGE UP (ref 4.8–10.8)

## 2024-11-19 PROCEDURE — 99232 SBSQ HOSP IP/OBS MODERATE 35: CPT

## 2024-11-19 PROCEDURE — 74018 RADEX ABDOMEN 1 VIEW: CPT | Mod: 26

## 2024-11-19 PROCEDURE — 99233 SBSQ HOSP IP/OBS HIGH 50: CPT

## 2024-11-19 RX ORDER — POTASSIUM CHLORIDE 600 MG/1
40 TABLET, EXTENDED RELEASE ORAL
Refills: 0 | Status: COMPLETED | OUTPATIENT
Start: 2024-11-19 | End: 2024-11-19

## 2024-11-19 RX ORDER — POTASSIUM CHLORIDE 600 MG/1
40 TABLET, EXTENDED RELEASE ORAL EVERY 4 HOURS
Refills: 0 | Status: DISCONTINUED | OUTPATIENT
Start: 2024-11-19 | End: 2024-11-19

## 2024-11-19 RX ORDER — POTASSIUM CHLORIDE 600 MG/1
20 TABLET, EXTENDED RELEASE ORAL
Refills: 0 | Status: COMPLETED | OUTPATIENT
Start: 2024-11-19 | End: 2024-11-20

## 2024-11-19 RX ORDER — POTASSIUM CHLORIDE 600 MG/1
20 TABLET, EXTENDED RELEASE ORAL
Refills: 0 | Status: COMPLETED | OUTPATIENT
Start: 2024-11-19 | End: 2024-11-19

## 2024-11-19 RX ADMIN — Medication 400 MILLIGRAM(S): at 08:24

## 2024-11-19 RX ADMIN — Medication 2 TABLET(S): at 11:24

## 2024-11-19 RX ADMIN — ENOXAPARIN SODIUM 40 MILLIGRAM(S): 30 INJECTION SUBCUTANEOUS at 05:51

## 2024-11-19 RX ADMIN — POTASSIUM CHLORIDE 50 MILLIEQUIVALENT(S): 600 TABLET, EXTENDED RELEASE ORAL at 12:45

## 2024-11-19 RX ADMIN — Medication 400 MILLIGRAM(S): at 17:15

## 2024-11-19 RX ADMIN — MEMANTINE HYDROCHLORIDE 5 MILLIGRAM(S): 14 CAPSULE, EXTENDED RELEASE ORAL at 11:24

## 2024-11-19 RX ADMIN — POTASSIUM CHLORIDE 40 MILLIEQUIVALENT(S): 600 TABLET, EXTENDED RELEASE ORAL at 11:27

## 2024-11-19 RX ADMIN — Medication 1 APPLICATION(S): at 17:17

## 2024-11-19 RX ADMIN — Medication 10 MILLIGRAM(S): at 21:21

## 2024-11-19 RX ADMIN — POTASSIUM CHLORIDE 50 MILLIEQUIVALENT(S): 600 TABLET, EXTENDED RELEASE ORAL at 10:29

## 2024-11-19 RX ADMIN — AMLODIPINE BESYLATE 5 MILLIGRAM(S): 10 TABLET ORAL at 05:50

## 2024-11-19 RX ADMIN — CARVEDILOL 12.5 MILLIGRAM(S): 25 TABLET, FILM COATED ORAL at 17:16

## 2024-11-19 RX ADMIN — Medication 81 MILLIGRAM(S): at 11:24

## 2024-11-19 RX ADMIN — POTASSIUM CHLORIDE 40 MILLIEQUIVALENT(S): 600 TABLET, EXTENDED RELEASE ORAL at 09:23

## 2024-11-19 RX ADMIN — Medication 1 APPLICATION(S): at 05:51

## 2024-11-19 RX ADMIN — LOSARTAN POTASSIUM 25 MILLIGRAM(S): 100 TABLET, FILM COATED ORAL at 05:51

## 2024-11-19 RX ADMIN — CHLORHEXIDINE GLUCONATE 1 APPLICATION(S): 1.2 RINSE ORAL at 05:54

## 2024-11-19 NOTE — PROGRESS NOTE ADULT - ASSESSMENT
Assessment and Plan  Case of a 89-year-old female patient with history of hypertension, dyslipidemia, sinus bradycardia status post permanent pacemaker several years ago, congestive heart failure status post AICD, possible history of DVT status post IVC filter on Eliquis, and recent left hip fracture status post replacement around 10 days ago at [facility] who was brought to the ED on November 7 for evaluation of hypokalemia in the setting of 2-day history of colonic ileus on KUB at the nursing home.  She was found to have hypokalemia and evidence of questionable twisting at the sigmoid suggestive of volvulus with moderate gaseous distention of the colon.  We are contacted in the setting of concern of sigmoid volvulus.      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 with moderate gaseous distention of the colon/pseudoobstruction s/p rectal tube replacement 11/16 and 11/18- Improvement  Recent moderate colonic ileus in setting of recent left candida-arhtroplasty and hypokalemia (on lasix)  Chronic constipation; No Evidence of LBO  Chronic anemia with no overt GI bleed  * Was admitted recently to [facility] for left hip fracture status post left hip replacement around 10 days ago  * Reports chronic constipation with 2 days of left lower quadrant discomfort (nonradiating, mild around 2/10 in intensity); last flatus 2 days ago; no nausea or vomiting or fever or chills; reports intentional subjective weight loss; no melena or hematochezia; no dysphagia.  She was evaluated at the nursing home: Status post KUB on November 5 in November 6 showing stable moderate colonic ileus.  Status post blood work showing potassium of 2.5 at the nursing home on November 5    * Currently hemodynamically stable  * Labs: WBC 6.78, Hb 10.1, Plt 272, Na 139, K 3.4, BUN 8, Cr 0.6 on 11/16 -> WBC 7.2, Hb 10.6, Plt 291, Na 139, K 3.5, BUN 8, Cr 0.6 on 11/17 -> WBC 6.3, Hb 10.8, Plt 210, Na 138, K 3.9, BUN 7,Cr 0.7 11/18 -> WBC 7.63, Hb 11, Plt 297, Na 138, K 3, BUN 8, Cr 0.5 on 11/19  * INR 1.56 on 11/07; Lactate 0.7 on 11/07 -> 0.9 on 11/16  * CT abdomen pelvis with IV contrast revealed questionable twisting of the sigmoid mesentery suggestive of volvulus, moderate gaseous distention of the colon with liquid stool in the rectum, right lower quadrant calcified 2.6 cm density  * No previous EGD or colonoscopy per patient and nephew  * No family history of GI malignancies  * S/P Flexible Sigmoidoscopy (11/7) : the scope was traversed through the anus up to the sigmoid colon. No twisting was noted. The colon was noted to be markedly distended with stool residues throughout. Aggressive suctioning was performed and successful decompression was achieved. At the end of the exam, the abdomen was very soft and non distended  * S/P rectal tube placement on 11/07   * Repeat KUB 11/09 resolved distention  * Repeat KUB 11/10-11/11: increased distention   * S/P rectal tube placement on 11/11  * Repeat KUB 11/12-11/13: decompressed -> rectal tube removed 11/14  * Repeat KUB 11/15-11/16: increasing gaseous distention  * S/P rectal tube placement on 11/16 -> soft abdomen; non distended  * S/P rectal tube dislodgement then replacement on 11/18 with marked improvement on KUB 11/18 PM -> KUB 11/19 noted    RECOMMENDATIONS  - Monitor rectal tube output: would continue bowel regimen (senna 2 tabs, miralax 17g BID, dulcolax 10mg QD, magnesium oxide 400mg BID)  - Monitor flatus  - Recommend serial abdominal exams. Repeat KUB daily. Recommend surgery follow up   - Continue clear liquid diet in setting of soft abdomen and rectal tube drainage  - Trend lactic acid   - Recommend PT/OT and incentive spirometry  - Monitor electrolytes and replete as indicated  - Avoid calcium channel blocker, sedatives, opioids, and anticholinergics  - Trend CBC and keep active type and screen.  In the setting of stable hemoglobin and absence of overt bleeding, would continue necessary anticoagulation if deemed necessary and if benefits outweigh risks  - Discussed risks and benefits of colonoscopy with patient and cousin HCP over phone (Ms Pineda ) on 11/19. Since is still refusing to drink the 4L of golytely and to place an NG tube for the prep, family and patient decided to hold off endoscopic evaluation   - Recommend outpatient follow-up at the GI MAC clinic in the setting of chronic anemia for further workup if within goals of care.  Discussed with the healthcare proxy but withhold of screening colonoscopy for now      Thank you for your consult.  - Please note that plan was communicated with medical team.   - Please reach GI on [phone] during weekdays till 5pm.  - Please call the GI service line after 5pm on Weekdays and anytime on Weekends: [phone].      Kamar Mercado MD  PGY - 5 Gastroenterology Fellow   [facility]   Assessment and Plan  Case of a 89-year-old female patient with history of hypertension, dyslipidemia, sinus bradycardia status post permanent pacemaker several years ago, congestive heart failure status post AICD, possible history of DVT status post IVC filter on Eliquis, and recent left hip fracture status post replacement around 10 days ago at [facility] who was brought to the ED on November 7 for evaluation of hypokalemia in the setting of 2-day history of colonic ileus on KUB at the nursing home.  She was found to have hypokalemia and evidence of questionable twisting at the sigmoid suggestive of volvulus with moderate gaseous distention of the colon.  We are contacted in the setting of concern of sigmoid volvulus.      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 with moderate gaseous distention of the colon/pseudoobstruction s/p rectal tube replacement 11/16 and 11/18- Improvement  Recent moderate colonic ileus in setting of recent left candida-arhtroplasty and hypokalemia (on lasix)  Chronic constipation; No Evidence of LBO  Chronic anemia with no overt GI bleed  * Was admitted recently to [facility] for left hip fracture status post left hip replacement around 10 days ago  * Reports chronic constipation with 2 days of left lower quadrant discomfort (nonradiating, mild around 2/10 in intensity); last flatus 2 days ago; no nausea or vomiting or fever or chills; reports intentional subjective weight loss; no melena or hematochezia; no dysphagia.  She was evaluated at the nursing home: Status post KUB on November 5 in November 6 showing stable moderate colonic ileus.  Status post blood work showing potassium of 2.5 at the nursing home on November 5    * Currently hemodynamically stable  * Labs: WBC 6.78, Hb 10.1, Plt 272, Na 139, K 3.4, BUN 8, Cr 0.6 on 11/16 -> WBC 7.2, Hb 10.6, Plt 291, Na 139, K 3.5, BUN 8, Cr 0.6 on 11/17 -> WBC 6.3, Hb 10.8, Plt 210, Na 138, K 3.9, BUN 7,Cr 0.7 11/18 -> WBC 7.63, Hb 11, Plt 297, Na 138, K 3, BUN 8, Cr 0.5 on 11/19  * INR 1.56 on 11/07; Lactate 0.7 on 11/07 -> 0.9 on 11/16  * CT abdomen pelvis with IV contrast revealed questionable twisting of the sigmoid mesentery suggestive of volvulus, moderate gaseous distention of the colon with liquid stool in the rectum, right lower quadrant calcified 2.6 cm density  * No previous EGD or colonoscopy per patient and nephew  * No family history of GI malignancies  * S/P Flexible Sigmoidoscopy (11/7) : the scope was traversed through the anus up to the sigmoid colon. No twisting was noted. The colon was noted to be markedly distended with stool residues throughout. Aggressive suctioning was performed and successful decompression was achieved. At the end of the exam, the abdomen was very soft and non distended  * S/P rectal tube placement on 11/07   * Repeat KUB 11/09 resolved distention  * Repeat KUB 11/10-11/11: increased distention   * S/P rectal tube placement on 11/11  * Repeat KUB 11/12-11/13: decompressed -> rectal tube removed 11/14  * Repeat KUB 11/15-11/16: increasing gaseous distention  * S/P rectal tube placement on 11/16 -> soft abdomen; non distended  * S/P rectal tube dislodgement then replacement on 11/18 with marked improvement on KUB 11/18 PM -> KUB 11/19 noted    RECOMMENDATIONS  - Monitor rectal tube output: would continue bowel regimen (senna 2 tabs, miralax 17g BID, dulcolax 10mg QD, magnesium oxide 400mg BID)  - Monitor flatus  - Recommend serial abdominal exams. Repeat KUB daily. Recommend surgery follow up   - Continue clear liquid diet in setting of soft abdomen and rectal tube drainage  - Trend lactic acid   - Recommend PT/OT and incentive spirometry  - Monitor electrolytes and replete as indicated  - Avoid calcium channel blocker, sedatives, opioids, and anticholinergics  - Trend CBC and keep active type and screen.  In the setting of stable hemoglobin and absence of overt bleeding, would continue necessary anticoagulation if deemed necessary and if benefits outweigh risks  - Discussed risks and benefits of colonoscopy with patient and cousin HCP over phone (Ms Pineda ) on 11/19. Since is still refusing to drink the 4L of golytely and to place an NG tube for the prep, family and patient decided to hold off endoscopic evaluation , reflects understanding   - Recommend outpatient follow-up at the GI MAC clinic in the setting of chronic anemia for further workup if within goals of care.  Discussed with the healthcare proxy but withhold of screening colonoscopy for now      Thank you for your consult.  - Please note that plan was communicated with medical team.   - Please reach GI on [phone] during weekdays till 5pm.  - Please call the GI service line after 5pm on Weekdays and anytime on Weekends: [phone].      Kamar Mercado MD  PGY - 5 Gastroenterology Fellow   [facility]

## 2024-11-19 NOTE — PROGRESS NOTE ADULT - SUBJECTIVE AND OBJECTIVE BOX
Gastroenterology Follow Up Note      Location: [facility] 3A 013 A ([facility] 3A)  Patient Name: RAMA MARMOLEJO  Age: 89y  Gender: Female      Chief Complaint  Patient is a 89y old Female who presents with a chief complaint of low potassium and abd pain, constipation (16 Nov 2024 12:41)  Primary diagnosis of Hypokalemia      Reason for Consult  Colonic Distention      Progress Note  This morning patient was seen and examined at bedside.    Today is hospital day 12d.  Patient had increased abdominal distention on 11/16 s/p rectal tube placement (draining brown stools on 11/17).  The rectal tube was dislodged on 11/18 at 4 AM and replaced at 06:30 AM on 11/18.  She notes improvement/near resolution of pressure-like abdominal discomfort.  She has otherwise been tolerating PO diet and denied nausea or vomiting.      Vital Signs in the last 24 hours   T(C): 36.5 (19 Nov 2024 11:27), Max: 37.2 (19 Nov 2024 04:43)  T(F): 97.7 (19 Nov 2024 11:27), Max: 98.9 (19 Nov 2024 04:43)  HR: 60 (19 Nov 2024 11:27) (58 - 60)  BP: 149/61 (19 Nov 2024 11:27) (134/71 - 149/61)  BP(mean): 83 (19 Nov 2024 04:43) (83 - 83)  RR: 17 (19 Nov 2024 11:27) (17 - 19)  SpO2: 97% (19 Nov 2024 11:27) (93% - 97%)    Parameters below as of 19 Nov 2024 04:43  Patient On (Oxygen Delivery Method): room air        Physical Exam  * General Appearance: Alert, cooperative, interactive but hard of hearing, oriented to time, place, and person, in no acute distress  * Eyes: PERRL, conjunctiva/corneas clear, EOM's intact, fundi benign, both eyes  * Lungs: Good bilateral air entry, normal breath sounds (Clear to auscultation bilaterally, no audible wheezes, crackles, or rhonchi)  * Heart: Regular Rate and Rhythm, normal S1 and S2, no audible murmur, rub, or gallop  * Abdomen: Symmetric, softly moderately distended prior to rectal tube placement -> improved post rectal tube on 11/16, non-tender, bowel sounds active all four quadrants  * Rectal: s/p rectal tube replacement on 11/18 with drainage of yellow brown stools; air heard on insertion of finger      Investigations                          11.0   7.63  )-----------( 297      ( 19 Nov 2024 05:19 )             33.2     11-19    138  |  102  |  8[L]  ----------------------------<  82  3.0[L]   |  22  |  0.5[L]    Ca    10.4      19 Nov 2024 05:19  Mg     2.1     11-19    TPro  6.1  /  Alb  3.2[L]  /  TBili  0.5  /  DBili  x   /  AST  29  /  ALT  7   /  AlkPhos  96  11-18        LIVER FUNCTIONS - ( 18 Nov 2024 06:36 )  Alb: 3.2 g/dL / Pro: 6.1 g/dL / ALK PHOS: 96 U/L / ALT: 7 U/L / AST: 29 U/L / GGT: x               Urinalysis Basic - ( 19 Nov 2024 05:19 )    Color: x / Appearance: x / SG: x / pH: x  Gluc: 82 mg/dL / Ketone: x  / Bili: x / Urobili: x   Blood: x / Protein: x / Nitrite: x   Leuk Esterase: x / RBC: x / WBC x   Sq Epi: x / Non Sq Epi: x / Bacteria: x        Current Medications  Standing Medications  amLODIPine   Tablet 5 milliGRAM(s) Oral daily  aspirin  chewable 81 milliGRAM(s) Oral daily  bisacodyl 10 milliGRAM(s) Oral at bedtime  carvedilol 12.5 milliGRAM(s) Oral every 12 hours  chlorhexidine 2% Cloths 1 Application(s) Topical <User Schedule>  enoxaparin Injectable 40 milliGRAM(s) SubCutaneous every 24 hours  losartan 25 milliGRAM(s) Oral daily  magnesium oxide 400 milliGRAM(s) Oral two times a day with meals  memantine 5 milliGRAM(s) Oral daily  polyethylene glycol 3350 17 Gram(s) Oral every 12 hours  potassium chloride   Powder 20 milliEquivalent(s) Oral daily  senna 2 Tablet(s) Oral daily    PRN Medications  ondansetron Injectable 4 milliGRAM(s) IV Push once PRN Nausea and/or Vomiting    Singles Doses Administered  (ADM OVERRIDE) 1 each &lt;see task&gt; GiveOnce  (ADM OVERRIDE) 1 each &lt;see task&gt; GiveOnce  (ADM OVERRIDE) 1 each &lt;see task&gt; GiveOnce  (ADM OVERRIDE) 1 each &lt;see task&gt; GiveOnce  (ADM OVERRIDE) 2 each &lt;see task&gt; GiveOnce  (ADM OVERRIDE) 2 each &lt;see task&gt; GiveOnce  acetaminophen     Tablet .. 650 milliGRAM(s) Oral once PRN  amLODIPine   Tablet 5 milliGRAM(s) Oral once  lactated ringers Bolus 1000 milliLiter(s) IV Bolus once  magnesium sulfate  IVPB 2 Gram(s) IV Intermittent once  magnesium sulfate  IVPB 2 Gram(s) IV Intermittent Once  magnesium sulfate  IVPB 2 Gram(s) IV Intermittent once  midazolam  1 mG/mL Injectable (Rx Quick Charge) 2 milliGRAM(s) IV Push   potassium chloride   Powder 40 milliEquivalent(s) Oral Once  potassium chloride   Powder 40 milliEquivalent(s) Oral two times a day  potassium chloride   Powder 40 milliEquivalent(s) Oral every 2 hours  potassium chloride  20 mEq/100 mL IVPB 20 milliEquivalent(s) IV Intermittent once  potassium chloride  20 mEq/100 mL IVPB 20 milliEquivalent(s) IV Intermittent every 2 hours  potassium chloride  20 mEq/100 mL IVPB 20 milliEquivalent(s) IV Intermittent once  potassium chloride  20 mEq/100 mL IVPB 20 milliEquivalent(s) IV Intermittent every 2 hours  potassium chloride  20 mEq/100 mL IVPB 20 milliEquivalent(s) IV Intermittent every 2 hours  potassium chloride  20 mEq/100 mL IVPB 20 milliEquivalent(s) IV Intermittent every 2 hours

## 2024-11-19 NOTE — PROGRESS NOTE ADULT - ASSESSMENT
89 yo F with hx of HTN, HLD, CHB s/p DC PPM s/p BiV ICD upgrade, Chronic A.fib (not on AC due to recurrent fall) and HFpEF (G3DD) presents to ED for generalized abdominal pain, constipation and hypokalemia.       # abd distention, Chronic intestinal pseudo-obstruction  # Acute Sigmoid Volvulus s/p sigmoidoscopy decompression   # Hypokalemia / Hypomagnesemia   #Acute Cystitis ruled out   #Chronic A.fib mpt on AC / HFimpEF s/p AICD BiV   # HTN  HLD       PLANs:    - symptoms resolved s/p sigmoidoscopy decompression and rectal tube   - was tolerating diet and passed BMs till 11/16, had abd distention and KUB showed increase distention  - s/p rectal tube 11/16   - GI aware, was planned for c-scope, cant tolerate prep even w NGT,   - daily KUIB, rectal tube per GI, possibly removal in AM   - c/w senna, miralax, dulcolax,   - keep K >4 and Mg > 2, on PO supplements (increase K to 40 meq ) should DC pt on PO supplements   - c/w ASA/ Amlodipine / Losartan / atorvastatin / Coreg dec 12.5 mg w holding parameter    - DVT ppx lovenox   - DNR/DNI  - nephew updated at the bedside on 11/18   Pending: clinical improvement of abd distention, possible c-scope. removal of rectal tube  Dispo: STR, nephew updated at the bedside 11/15 89 yo F with hx of HTN, HLD, CHB s/p DC PPM s/p BiV ICD upgrade, Chronic A.fib (not on AC due to recurrent fall) and HFpEF (G3DD) presents to ED for generalized abdominal pain, constipation and hypokalemia.       # abd distention, Chronic intestinal pseudo-obstruction  # Acute Sigmoid Volvulus s/p sigmoidoscopy decompression   # Hypokalemia / Hypomagnesemia   #Acute Cystitis ruled out   #Chronic A.fib mpt on AC / HFimpEF s/p AICD BiV   # HTN  HLD       PLANs:    - symptoms resolved s/p sigmoidoscopy decompression and rectal tube   - was tolerating diet and passed BMs till 11/16, had abd distention and KUB showed increase distention  - s/p rectal tube 11/16   - GI aware, was planned for c-scope, cant tolerate prep even w NGT,   - daily KUIB, rectal tube per GI, possibly removal in AM   - c/w senna, miralax, dulcolax,   - keep K >4 and Mg > 2, on PO supplements (increase K to 40 meq ) should DC pt on PO supplements   - c/w ASA/ Amlodipine / Losartan / atorvastatin / Coreg dec 12.5 mg w holding parameter    - DVT ppx lovenox   - DNR/DNI  - spoke ot the niece Maral 11/19 she will discuss with the nephew for going on hospice   Pending: clinical improvement of abd distention, possible c-scope. removal of rectal tube  Dispo: TBD 89 yo F with hx of HTN, HLD, CHB s/p DC PPM s/p BiV ICD upgrade, Chronic A.fib (not on AC due to recurrent fall) and HFpEF (G3DD) presents to ED for generalized abdominal pain, constipation and hypokalemia.       # abd distention, Chronic intestinal pseudo-obstruction  # Acute Sigmoid Volvulus s/p sigmoidoscopy decompression   # Hypokalemia / Hypomagnesemia   #Acute Cystitis ruled out   #Chronic A.fib mpt on AC / HFimpEF s/p AICD BiV   # HTN  HLD       PLANs:    - symptoms resolved s/p sigmoidoscopy decompression and rectal tube   - was tolerating diet and passed BMs till 11/16, had abd distention and KUB showed increase distention  - s/p rectal tube 11/16   - GI aware, was planned for c-scope, cant tolerate prep even w NGT,   - daily KUIB, rectal tube per GI,  - c/w senna, miralax, dulcolax,   - keep K >4 and Mg > 2, on PO supplements (increase K to 40 meq ) should DC pt on PO supplements   - c/w ASA/ Amlodipine / Losartan / atorvastatin / Coreg dec 12.5 mg w holding parameter    - DVT ppx lovenox   - DNR/DNI  - see GOCs note 11/19  Pending: palliative and hospice eval   Dispo: RUPESH 89 yo F with hx of HTN, HLD, CHB s/p DC PPM s/p BiV ICD upgrade, Chronic A.fib (not on AC due to recurrent fall) and HFpEF (G3DD) presents to ED for generalized abdominal pain, constipation and hypokalemia.       # abd distention, Chronic intestinal pseudo-obstruction  # Acute Sigmoid Volvulus s/p sigmoidoscopy decompression   # Hypokalemia / Hypomagnesemia   #Acute Cystitis ruled out   #Chronic A.fib mpt on AC / HFimpEF s/p AICD BiV   # HTN  HLD       PLANs:    - symptoms resolved s/p sigmoidoscopy decompression and rectal tube   - was tolerating diet and passed BMs till 11/16, had abd distention and KUB showed increase distention  - s/p rectal tube 11/16   - GI aware, was planned for c-scope, cant tolerate prep even w NGT,   - daily KUIB, rectal tube per GI,  - c/w senna, miralax, dulcolax,   - keep K >4 and Mg > 2, on PO supplements (increase K to 40 meq ) should DC pt on PO supplements   - c/w ASA/ Amlodipine / Losartan / atorvastatin / Coreg dec 12.5 mg w holding parameter    - DVT ppx lovenox   - DNR/DNI  - patient lack insight to understand and decide on her medical conditions, see [facility] note 11/19  Pending: palliative and hospice eval   Dispo: [email or web address]

## 2024-11-19 NOTE — PROGRESS NOTE ADULT - SUBJECTIVE AND OBJECTIVE BOX
Patient is a 89y old  Female who presents with a chief complaint of low potassium and abd pain, constipation (18 Nov 2024 12:38)      OVERNIGHT EVENTS: remained stable     SUBJECTIVE / INTERVAL HPI: Patient seen and examined at bedside.     VITAL SIGNS:  Vital Signs Last 24 Hrs  T(C): 37.2 (19 Nov 2024 04:43), Max: 37.2 (19 Nov 2024 04:43)  T(F): 98.9 (19 Nov 2024 04:43), Max: 98.9 (19 Nov 2024 04:43)  HR: 58 (19 Nov 2024 04:43) (58 - 76)  BP: 136/56 (19 Nov 2024 04:43) (134/71 - 142/62)  BP(mean): 83 (19 Nov 2024 04:43) (83 - 83)  RR: 19 (19 Nov 2024 04:43) (16 - 19)  SpO2: 95% (19 Nov 2024 04:43) (93% - 95%)    Parameters below as of 19 Nov 2024 04:43  Patient On (Oxygen Delivery Method): room air        PHYSICAL EXAM:    General: old lady not in distress   HEENT: NC/AT; PERRL, clear conjunctiva  Neck: supple  Cardiovascular: +S1/S2; RRR  Respiratory: CTA b/l; no W/R/R  Gastrointestinal: soft, non tender, mild abd distention; +BSx4  Extremities: WWP; 2+ peripheral pulses; no edema   Neurological: AAOx3; no focal deficits      MEDICATIONS:  MEDICATIONS  (STANDING):  amLODIPine   Tablet 5 milliGRAM(s) Oral daily  aspirin  chewable 81 milliGRAM(s) Oral daily  bisacodyl 10 milliGRAM(s) Oral at bedtime  carvedilol 12.5 milliGRAM(s) Oral every 12 hours  chlorhexidine 2% Cloths 1 Application(s) Topical <User Schedule>  enoxaparin Injectable 40 milliGRAM(s) SubCutaneous every 24 hours  losartan 25 milliGRAM(s) Oral daily  magnesium oxide 400 milliGRAM(s) Oral two times a day with meals  memantine 5 milliGRAM(s) Oral daily  polyethylene glycol 3350 17 Gram(s) Oral every 12 hours  potassium chloride   Powder 40 milliEquivalent(s) Oral every 2 hours  potassium chloride   Powder 20 milliEquivalent(s) Oral daily  senna 2 Tablet(s) Oral daily  zinc oxide 20% Ointment 1 Application(s) Topical two times a day    MEDICATIONS  (PRN):  ondansetron Injectable 4 milliGRAM(s) IV Push once PRN Nausea and/or Vomiting      ALLERGIES:  Allergies    codeine (Unknown)  penicillin (Unknown)    Intolerances        LABS:                        11.0   7.63  )-----------( 297      ( 19 Nov 2024 05:19 )             33.2     11-19    138  |  102  |  8[L]  ----------------------------<  82  3.0[L]   |  22  |  0.5[L]    Ca    10.4      19 Nov 2024 05:19  Mg     2.1     11-19    TPro  6.1  /  Alb  3.2[L]  /  TBili  0.5  /  DBili  x   /  AST  29  /  ALT  7   /  AlkPhos  96  11-18      Urinalysis Basic - ( 19 Nov 2024 05:19 )    Color: x / Appearance: x / SG: x / pH: x  Gluc: 82 mg/dL / Ketone: x  / Bili: x / Urobili: x   Blood: x / Protein: x / Nitrite: x   Leuk Esterase: x / RBC: x / WBC x   Sq Epi: x / Non Sq Epi: x / Bacteria: x      CAPILLARY BLOOD GLUCOSE          RADIOLOGY & ADDITIONAL TESTS: Reviewed.    ASSESSMENT:    PLAN:

## 2024-11-19 NOTE — PROGRESS NOTE ADULT - ASSESSMENT
89 yo F with hx of HTN, HLD, CHB s/p DC PPM s/p BiV ICD upgrade, Chronic A.fib (not on AC due to recurrent fall) and HFpEF (G3DD) presents to ED for generalized abdominal pain, constipation and hypokalemia.       # abd distention, Chronic intestinal pseudo-obstruction  # Acute Sigmoid Volvulus s/p sigmoidoscopy decompression   # Hypokalemia / Hypomagnesemia   #Acute Cystitis ruled out   #Chronic A.fib mpt on AC / HFimpEF s/p AICD BiV   # HTN  HLD       PLANs:    - symptoms improved s/p sigmoidoscopy decompression and rectal tube   - was tolerating diet and passed BMs till 11/16, had abd distention and KUB showed increase distention  - s/p rectal tube 11/16   - GI aware, was planned for a c-scope, patient cannot tolerate prep with NGT  - Xray KUB: Interval placement of rectal catheter. Decreased gaseous distention of  the colon.  - Repeat KUB daily, rectal tube per GI, possible removal tomorrow AM  - c/w senna, miralax, dulcolax,   - keep K >4 and Mg > 2, on PO supplements   - K+ 3.0, Mg 2.1  - Start potassium chloride 20 mEq/100 mL IVPB. q2h, infuse over 120 minutes, stop after 2 doses   - Increase K+ to 40 mEq powder PO, DC on PO supplements  - c/w ASA/ Amlodipine / Losartan / atorvastatin / Coreg dec 12.5 mg w holding parameter    - DVT ppx lovenox   - DNR/DNI  Pending: clinical improvement of abd distention, possbile removal of rectal tube  Dispo: STR, nephew updated at the bedside 11/18

## 2024-11-19 NOTE — PROGRESS NOTE ADULT - SUBJECTIVE AND OBJECTIVE BOX
Patient is a 89y old Female who presents with a chief complaint of low potassium and abd pain, constipation (19 Nov 2024 09:51)      INTERVAL HPI/OVERNIGHT EVENTS: No acute events overnight. Patient reports resolution of abdominal pain. Denies fevers, chills, headache, dizziness, diarrhea urinary symptoms.      REVIEW OF SYSTEMS:  CONSTITUTIONAL: No fever, weight loss, or fatigue  EYES: No eye pain, visual disturbances, or discharge  ENMT:  No difficulty hearing, tinnitus, vertigo; No sinus or throat pain  NECK: No pain or stiffness  BREASTS: No pain, masses, or nipple discharge  RESPIRATORY: No cough, wheezing, chills or hemoptysis; No shortness of breath  CARDIOVASCULAR: No chest pain, palpitations, dizziness, or leg swelling  GASTROINTESTINAL: No abdominal or epigastric pain. No nausea, vomiting, or hematemesis; No diarrhea or constipation. No melena or hematochezia.  GENITOURINARY: No dysuria, frequency, hematuria, or incontinence  NEUROLOGICAL: No headaches, memory loss, loss of strength, numbness, or tremors  SKIN: No itching, burning, rashes, or lesions   LYMPH NODES: No enlarged glands  ENDOCRINE: No heat or cold intolerance; No hair loss  MUSCULOSKELETAL: No joint pain or swelling; No muscle, back, or extremity pain  PSYCHIATRIC: No depression, anxiety, mood swings, or difficulty sleeping  HEME/LYMPH: No easy bruising, or bleeding gums  ALLERY AND IMMUNOLOGIC: No hives or eczema    FAMILY HISTORY:  No pertinent family history in first degree relatives      T(C): 37.2 (11-19-24 @ 04:43), Max: 37.2 (11-19-24 @ 04:43)  HR: 58 (11-19-24 @ 04:43) (58 - 76)  BP: 136/56 (11-19-24 @ 04:43) (134/71 - 142/62)  RR: 19 (11-19-24 @ 04:43) (16 - 19)  SpO2: 95% (11-19-24 @ 04:43) (93% - 95%)  Wt(kg): --Vital Signs Last 24 Hrs  T(C): 37.2 (19 Nov 2024 04:43), Max: 37.2 (19 Nov 2024 04:43)  T(F): 98.9 (19 Nov 2024 04:43), Max: 98.9 (19 Nov 2024 04:43)  HR: 58 (19 Nov 2024 04:43) (58 - 76)  BP: 136/56 (19 Nov 2024 04:43) (134/71 - 142/62)  BP(mean): 83 (19 Nov 2024 04:43) (83 - 83)  RR: 19 (19 Nov 2024 04:43) (16 - 19)  SpO2: 95% (19 Nov 2024 04:43) (93% - 95%)    Parameters below as of 19 Nov 2024 04:43  Patient On (Oxygen Delivery Method): room air        PHYSICAL EXAM:  GENERAL: NAD, well-groomed, well-developed  HEAD:  Atraumatic, Normocephalic  EYES: EOMI, PERRLA, conjunctiva and sclera clear  ENMT: No tonsillar erythema, exudates, or enlargement; Moist mucous membranes, Good dentition, No lesions  NECK: Supple, No JVD, Normal thyroid  NERVOUS SYSTEM:  Alert & Oriented X3, Good concentration; Motor Strength 5/5 B/L upper and lower extremities; DTRs 2+ intact and symmetric  CHEST/LUNG: Clear to percussion bilaterally; No rales, rhonchi, wheezing, or rubs  HEART: Regular rate and rhythm; No murmurs, rubs, or gallops  ABDOMEN: Soft, Nontender, Nondistended; Bowel sounds present  EXTREMITIES:  2+ Peripheral Pulses, No clubbing, cyanosis, or edema  LYMPH: No lymphadenopathy noted  SKIN: No rashes or lesions    Consultant(s) Notes Reviewed:  [x ] YES  [ ] NO  Care Discussed with Consultants/Other Providers [ x] YES  [ ] NO    LABS:                       11.0   7.63  )-----------( 297      ( 19 Nov 2024 05:19 )             33.2     11-19    138  |  102  |  8[L]  ----------------------------<  82  3.0[L]   |  22  |  0.5[L]    Ca    10.4      19 Nov 2024 05:19  Mg     2.1     11-19    TPro  6.1  /  Alb  3.2[L]  /  TBili  0.5  /  DBili  x   /  AST  29  /  ALT  7   /  AlkPhos  96  11-18          RADIOLOGY & ADDITIONAL TESTS:    Imaging Personally Reviewed:  [ ] YES  [ ] NO  amLODIPine   Tablet 5 milliGRAM(s) Oral daily  aspirin  chewable 81 milliGRAM(s) Oral daily  bisacodyl 10 milliGRAM(s) Oral at bedtime  carvedilol 12.5 milliGRAM(s) Oral every 12 hours  chlorhexidine 2% Cloths 1 Application(s) Topical <User Schedule>  enoxaparin Injectable 40 milliGRAM(s) SubCutaneous every 24 hours  losartan 25 milliGRAM(s) Oral daily  magnesium oxide 400 milliGRAM(s) Oral two times a day with meals  memantine 5 milliGRAM(s) Oral daily  ondansetron Injectable 4 milliGRAM(s) IV Push once PRN  polyethylene glycol 3350 17 Gram(s) Oral every 12 hours  potassium chloride   Powder 40 milliEquivalent(s) Oral every 2 hours  potassium chloride   Powder 20 milliEquivalent(s) Oral daily  potassium chloride  20 mEq/100 mL IVPB 20 milliEquivalent(s) IV Intermittent every 2 hours  senna 2 Tablet(s) Oral daily  zinc oxide 20% Ointment 1 Application(s) Topical two times a day

## 2024-11-19 NOTE — GOALS OF CARE CONVERSATION - ADVANCED CARE PLANNING - CONVERSATION DETAILS
I lori the randal Alicia the current situation of the patient, the patient has Chronic intestinal pseudo-obstruction  relapsed after sigmoidoscopy and rectal tube removal  pt is bed bounbd after recent hip surgery, with advanced age all are strong factorsto challenge her problem,  in addition pt po intake has been declining, and thus the niece and the nephew Ernie Donaldson who are the only next kin to her decided to stop aggressive measures, requesting hospice eval, no blood draw or escalation level of care. patient lack insight to understand and decide on her medical conditions,    I dw the niece Maral over the phone  the current situation of the patient, the patient has Chronic intestinal pseudo-obstruction  relapsed after sigmoidoscopy and rectal tube removal  pt is bed bounbd after recent hip surgery, with advanced age all are strong factorsto challenge her problem,  in addition pt po intake has been declining, and thus the niece and the nephew Ernie Donaldson who are the only next kin to her decided to stop aggressive measures, requesting hospice eval, no blood draw or escalation level of care.

## 2024-11-20 VITALS — SYSTOLIC BLOOD PRESSURE: 165 MMHG | HEART RATE: 64 BPM | DIASTOLIC BLOOD PRESSURE: 66 MMHG

## 2024-11-20 DIAGNOSIS — E63.9 NUTRITIONAL DEFICIENCY, UNSPECIFIED: ICD-10-CM

## 2024-11-20 DIAGNOSIS — Z51.5 ENCOUNTER FOR PALLIATIVE CARE: ICD-10-CM

## 2024-11-20 DIAGNOSIS — Z71.89 OTHER SPECIFIED COUNSELING: ICD-10-CM

## 2024-11-20 DIAGNOSIS — K59.89 OTHER SPECIFIED FUNCTIONAL INTESTINAL DISORDERS: ICD-10-CM

## 2024-11-20 DIAGNOSIS — K56.2 VOLVULUS: ICD-10-CM

## 2024-11-20 PROCEDURE — 99233 SBSQ HOSP IP/OBS HIGH 50: CPT

## 2024-11-20 PROCEDURE — 99497 ADVNCD CARE PLAN 30 MIN: CPT | Mod: 25

## 2024-11-20 PROCEDURE — 99223 1ST HOSP IP/OBS HIGH 75: CPT

## 2024-11-20 RX ORDER — POTASSIUM CHLORIDE 600 MG/1
20 TABLET, EXTENDED RELEASE ORAL
Refills: 0 | Status: DISCONTINUED | OUTPATIENT
Start: 2024-11-20 | End: 2024-11-20

## 2024-11-20 RX ADMIN — POTASSIUM CHLORIDE 50 MILLIEQUIVALENT(S): 600 TABLET, EXTENDED RELEASE ORAL at 02:00

## 2024-11-20 RX ADMIN — Medication 1 APPLICATION(S): at 05:18

## 2024-11-20 RX ADMIN — Medication 81 MILLIGRAM(S): at 11:36

## 2024-11-20 RX ADMIN — POTASSIUM CHLORIDE 50 MILLIEQUIVALENT(S): 600 TABLET, EXTENDED RELEASE ORAL at 00:05

## 2024-11-20 RX ADMIN — Medication 1 APPLICATION(S): at 17:20

## 2024-11-20 RX ADMIN — CARVEDILOL 12.5 MILLIGRAM(S): 25 TABLET, FILM COATED ORAL at 05:16

## 2024-11-20 RX ADMIN — AMLODIPINE BESYLATE 5 MILLIGRAM(S): 10 TABLET ORAL at 05:16

## 2024-11-20 RX ADMIN — Medication 400 MILLIGRAM(S): at 08:42

## 2024-11-20 RX ADMIN — MEMANTINE HYDROCHLORIDE 5 MILLIGRAM(S): 14 CAPSULE, EXTENDED RELEASE ORAL at 11:36

## 2024-11-20 RX ADMIN — ENOXAPARIN SODIUM 40 MILLIGRAM(S): 30 INJECTION SUBCUTANEOUS at 05:17

## 2024-11-20 RX ADMIN — POTASSIUM CHLORIDE 20 MILLIEQUIVALENT(S): 600 TABLET, EXTENDED RELEASE ORAL at 11:36

## 2024-11-20 RX ADMIN — Medication 2 TABLET(S): at 11:37

## 2024-11-20 RX ADMIN — Medication 10 MILLIGRAM(S): at 21:12

## 2024-11-20 RX ADMIN — CHLORHEXIDINE GLUCONATE 1 APPLICATION(S): 1.2 RINSE ORAL at 05:21

## 2024-11-20 RX ADMIN — LOSARTAN POTASSIUM 25 MILLIGRAM(S): 100 TABLET, FILM COATED ORAL at 05:16

## 2024-11-20 NOTE — PROGRESS NOTE ADULT - TIME BILLING
direct pt care and interdisciplinary rounds / all imaging reviewed and coordinated care with GI  / Pt improving
direct pt care and interdisciplinary rounds / all imaging reviewed and coordinated care with GI and Surgery / Pt improving
Coordination of care
direct pt care and interdisciplinary rounds / imaging reviewed and previous labs / coordinated care with palliative and GI and Family
Coordination of care

## 2024-11-20 NOTE — CONSULT NOTE ADULT - CONVERSATION DETAILS
Spoke with patient and family at bedside. Miriam was over the phone. They discussed not wishing to pursue aggressive medical management, surgical intervention, or artificial nutrition. The patient wishes to go home. We discussed CMO and hospice.  Patient/family wishes to pursue CMO, hospice care, and deactivation of AICD.  All questions answered.

## 2024-11-20 NOTE — PROGRESS NOTE ADULT - ASSESSMENT
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  She was found to have hypokalemia and evidence of questionable twisting at the sigmoid suggestive of volvulus with moderate gaseous distention of the colon.  We are contacted in the setting of concern of sigmoid volvulus.      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 with moderate gaseous distention of the colon/pseudoobstruction s/p rectal tube replacement on 11/18- Improvement  Recent moderate colonic ileus in setting of recent left candida-arhtroplasty and hypokalemia (on lasix)  Chronic constipation; No Evidence of LBO  Chronic anemia with no overt GI bleed  * Was admitted recently to [facility] for left hip fracture status post left hip replacement around 10 days ago  * Reports chronic constipation with 2 days of left lower quadrant discomfort (nonradiating, mild around 2/10 in intensity); last flatus 2 days ago; no nausea or vomiting or fever or chills; reports intentional subjective weight loss; no melena or hematochezia; no dysphagia.  She was evaluated at the nursing home: Status post KUB on November 5 in November 6 showing stable moderate colonic ileus.  Status post blood work showing potassium of 2.5 at the nursing home on November 5    * Currently hemodynamically stable  * Labs: WBC 6.78, Hb 10.1, Plt 272, Na 139, K 3.4, BUN 8, Cr 0.6 on 11/16 -> WBC 7.2, Hb 10.6, Plt 291, Na 139, K 3.5, BUN 8, Cr 0.6 on 11/17 -> WBC 6.3, Hb 10.8, Plt 210, Na 138, K 3.9, BUN 7,Cr 0.7 11/18 -> WBC 7.63, Hb 11, Plt 297, Na 138, K 3, BUN 8, Cr 0.5 on 11/19  * INR 1.56 on 11/07; Lactate 0.7 on 11/07 -> 0.9 on 11/16  * CT abdomen pelvis with IV contrast revealed questionable twisting of the sigmoid mesentery suggestive of volvulus, moderate gaseous distention of the colon with liquid stool in the rectum, right lower quadrant calcified 2.6 cm density  * No previous EGD or colonoscopy per patient and nephew  * No family history of GI malignancies  * S/P Flexible Sigmoidoscopy (11/7) : the scope was traversed through the anus up to the sigmoid colon. No twisting was noted. The colon was noted to be markedly distended with stool residues throughout. Aggressive suctioning was performed and successful decompression was achieved. At the end of the exam, the abdomen was very soft and non distended  * S/P rectal tube placement on 11/07   * Repeat KUB 11/09 resolved distention  * Repeat KUB 11/10-11/11: increased distention   * S/P rectal tube placement on 11/11  * Repeat KUB 11/12-11/13: decompressed -> rectal tube removed 11/14  * Repeat KUB 11/15-11/16: increasing gaseous distention  * S/P rectal tube placement on 11/16 -> soft abdomen; non distended  * S/P rectal tube dislodgement then replacement on 11/18 with marked improvement on KUB 11/18 PM -> KUB 11/19 noted    RECOMMENDATIONS  - Monitor rectal tube output: would continue bowel regimen (senna 2 tabs, miralax 17g BID, dulcolax 10mg QD, magnesium oxide 400mg BID)  - Monitor flatus  - Recommend serial abdominal exams. Repeat KUB today and daily. Recommend surgery follow up   - Advance diet as tolerated in setting of soft abdomen and rectal tube drainage  - Trend lactic acid   - Recommend PT/OT and incentive spirometry  - Monitor electrolytes and replete as indicated  - Avoid calcium channel blocker, sedatives, opioids, and anticholinergics  - Trend CBC and keep active type and screen.  In the setting of stable hemoglobin and absence of overt bleeding, would continue necessary anticoagulation if deemed necessary and if benefits outweigh risks  - Discussed risks and benefits of colonoscopy with patient and cousin HCP over phone (Ms Pineda ) on 11/19. Since is still refusing to drink the 4L of golytely and to place an NG tube for the prep, family and patient decided to hold off endoscopic evaluation   - Recommend outpatient follow-up at the GI [facility] clinic in the setting of chronic anemia for further workup if within goals of care      Thank you for your consult.  - Please note that plan was communicated with medical team.   - Please reach GI on [phone] during weekdays till 5pm.  - Please call the GI service line after 5pm on Weekdays and anytime on Weekends: [phone].      Kamar Mercado MD  PGY - 5 Gastroenterology Fellow   [facility]   Assessment and Plan  Case of a 89-year-old female patient with history of hypertension, dyslipidemia, sinus bradycardia status post permanent pacemaker several years ago, congestive heart failure status post AICD, possible history of DVT status post IVC filter on Eliquis, and recent left hip fracture status post replacement around 10 days ago at [facility] who was brought to the ED on November 7 for evaluation of hypokalemia in the setting of 2-day history of colonic ileus on KUB at the nursing home.  She was found to have hypokalemia and evidence of questionable twisting at the sigmoid suggestive of volvulus with moderate gaseous distention of the colon.  We are contacted in the setting of concern of sigmoid volvulus.      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 with moderate gaseous distention of the colon/pseudoobstruction s/p rectal tube replacement on 11/18- Improvement  Recent moderate colonic ileus in setting of recent left candida-arhtroplasty and hypokalemia (on lasix)  Chronic constipation; No Evidence of LBO  Chronic anemia with no overt GI bleed  * Was admitted recently to [facility] for left hip fracture status post left hip replacement around 10 days ago  * Reports chronic constipation with 2 days of left lower quadrant discomfort (nonradiating, mild around 2/10 in intensity); last flatus 2 days ago; no nausea or vomiting or fever or chills; reports intentional subjective weight loss; no melena or hematochezia; no dysphagia.  She was evaluated at the nursing home: Status post KUB on November 5 in November 6 showing stable moderate colonic ileus.  Status post blood work showing potassium of 2.5 at the nursing home on November 5    * Currently hemodynamically stable  * Labs: WBC 6.78, Hb 10.1, Plt 272, Na 139, K 3.4, BUN 8, Cr 0.6 on 11/16 -> WBC 7.2, Hb 10.6, Plt 291, Na 139, K 3.5, BUN 8, Cr 0.6 on 11/17 -> WBC 6.3, Hb 10.8, Plt 210, Na 138, K 3.9, BUN 7,Cr 0.7 11/18 -> WBC 7.63, Hb 11, Plt 297, Na 138, K 3, BUN 8, Cr 0.5 on 11/19  * INR 1.56 on 11/07; Lactate 0.7 on 11/07 -> 0.9 on 11/16  * CT abdomen pelvis with IV contrast revealed questionable twisting of the sigmoid mesentery suggestive of volvulus, moderate gaseous distention of the colon with liquid stool in the rectum, right lower quadrant calcified 2.6 cm density  * No previous EGD or colonoscopy per patient and nephew  * No family history of GI malignancies  * S/P Flexible Sigmoidoscopy (11/7) : the scope was traversed through the anus up to the sigmoid colon. No twisting was noted. The colon was noted to be markedly distended with stool residues throughout. Aggressive suctioning was performed and successful decompression was achieved. At the end of the exam, the abdomen was very soft and non distended  * S/P rectal tube placement on 11/07   * Repeat KUB 11/09 resolved distention  * Repeat KUB 11/10-11/11: increased distention   * S/P rectal tube placement on 11/11  * Repeat KUB 11/12-11/13: decompressed -> rectal tube removed 11/14  * Repeat KUB 11/15-11/16: increasing gaseous distention  * S/P rectal tube placement on 11/16 -> soft abdomen; non distended  * S/P rectal tube dislodgement then replacement on 11/18 with marked improvement on KUB 11/18 PM -> KUB 11/19 noted    RECOMMENDATIONS  - Monitor rectal tube output: would continue bowel regimen (senna 2 tabs, miralax 17g BID, dulcolax 10mg QD, magnesium oxide 400mg BID)  - Monitor flatus  - Recommend serial abdominal exams. Repeat KUB today and daily. Recommend surgery follow up   - Advance diet as tolerated in setting of soft abdomen and rectal tube drainage  - Trend lactic acid   - Recommend PT/OT and incentive spirometry  - surgery eval for history of volvulus   - Monitor electrolytes and replete as indicated  - Avoid calcium channel blocker, sedatives, opioids, and anticholinergics  - Trend CBC and keep active type and screen.  In the setting of stable hemoglobin and absence of overt bleeding, would continue necessary anticoagulation if deemed necessary and if benefits outweigh risks  - Discussed risks and benefits of colonoscopy with patient and cousin HCP over phone (Ms Pineda ) on 11/19. Since is still refusing to drink the 4L of golytely and to place an NG tube for the prep, family and patient decided to hold off endoscopic evaluation   - Recommend outpatient follow-up at the GI [facility] clinic in the setting of chronic anemia for further workup if within goals of care      Thank you for your consult.  - Please note that plan was communicated with medical team.   - Please reach GI on [phone] during weekdays till 5pm.  - Please call the GI service line after 5pm on Weekdays and anytime on Weekends: [phone].      Kamar Mercado MD  PGY - 5 Gastroenterology Fellow   [facility]

## 2024-11-20 NOTE — HOSPICE CARE NOTE - CONVESATION DETAILS
Hospice referral completed. Phone call to patient's niece Miriam [phone], reviewed hospice services and philosophy. Patient lives at home with 24hr HHA's. Miriam is receptive to home hospice and states that she wants to have her aunt go home and be made comfortable. Hospice admission is set for Friday 11/22/24.

## 2024-11-20 NOTE — CONSULT NOTE ADULT - SUBJECTIVE AND OBJECTIVE BOX
GENERAL SURGERY CONSULT NOTE    Patient: RAMA MARMOLEJO , 89y ([birth year])Female   MRN: [id number]  Location: [facility] Pre-op Hold 006 A  Visit: 11-07-24 Inpatient  Date: 11-08-24 @ 01:53    HPI:  89-year-old female patient with history of hypertension, dyslipidemia, sinus bradycardia status post permanent pacemaker several years ago, congestive heart failure status post AICD, possible history of DVT status post IVC filter on Eliquis, and recent left hip fracture status post replacement around 10 days ago at [facility] who was brought to the ED on November 7 for evaluation of hypokalemia in the setting of 2-day history of colonic ileus on KUB at the nursing home.  She was found to have hypokalemia and evidence of questionable twisting at the sigmoid suggestive of volvulus with moderate gaseous distention of the colon.  Patient being admitted for urgent surgical management. CONFIRM MED RECC IN AM    ED course:   · BP Systolic  130 mm Hg  · BP Diastolic  64 mm Hg  · Heart Rate  60 /min  · Respiration Rate (breaths/min)  18 /min  · Temp (F)  99.1 Degrees F  · Temp (C)  37.3 Degrees C  · Temp site  oral  · SpO2 (%)  98 %  · O2 Delivery/Oxygen Delivery Method  room air  · Temp at ED Arrival (C)  37.3 Degrees C    CTAP IV cont:   IMPRESSION:  Diffuse distention of the sigmoid colon. Possible area of sigmoid colon   twisting which may represent a sigmoid volvulus.    Urinary bladder inflammatory changes which can be seen with cystitis.   Recommend correlation with urinalysis     (07 Nov 2024 23:09)    Surgery consulted for evaluation of possible sigmoid volvulus. The patient is A&Ox2, does not seem to understand to why she is here. The patient denies any nausea, vomiting, abdominal pain. The patient reports she does not know when her last BM was or when she last passed a BM.       PAST MEDICAL & SURGICAL HISTORY:  High cholesterol      HTN (hypertension)      Pacemaker      AICD (automatic cardioverter/defibrillator) present      S/P hip replacement  b/l      S/P knee replacement      S/P cataract surgery          Home Medications:  amLODIPine 2.5 mg oral tablet: 1 orally (08 Sep 2023 09:39)  aspirin 81 mg oral tablet, chewable: 1 tab(s) orally once a day (08 Sep 2023 09:39)  carvedilol 25 mg oral tablet: 1 tab(s) orally 2 times a day (08 Sep 2023 09:39)  furosemide 20 mg oral tablet: 1 orally (08 Sep 2023 09:39)  Linzess 145 mcg oral capsule: 1 orally (08 Sep 2023 09:39)  losartan 25 mg oral tablet: 1 orally (08 Sep 2023 09:39)        VITALS:  T(F): 97.7 (11-08-24 @ 00:10), Max: 99.1 (11-07-24 @ 15:38)  HR: 60 (11-08-24 @ 01:45) (60 - 60)  BP: 154/79 (11-08-24 @ 01:45) (110/66 - 172/84)  RR: 18 (11-07-24 @ 23:08) (18 - 18)  SpO2: 95% (11-08-24 @ 01:45) (95% - 98%)    PHYSICAL EXAM:  General: NAD, AAOx3, calm and cooperative  HEENT: NCAT, CROW, EOMI, Trachea ML, Neck supple  Cardiac: RRR  Respiratory: CTAB  Abdomen: Soft, distended, non-tender, no rebound, no guarding.      MEDICATIONS  (STANDING):  lactated ringers. 1000 milliLiter(s) (75 mL/Hr) IV Continuous <Continuous>  lactulose Syrup 10 Gram(s) Oral two times a day  polyethylene glycol 3350 17 Gram(s) Oral every 12 hours  potassium chloride  20 mEq/100 mL IVPB 20 milliEquivalent(s) IV Intermittent every 2 hours  senna 2 Tablet(s) Oral daily    MEDICATIONS  (PRN):  ondansetron Injectable 4 milliGRAM(s) IV Push once PRN Nausea and/or Vomiting      LAB/STUDIES:                        10.3   9.27  )-----------( 313      ( 07 Nov 2024 16:50 )             31.2     11-07    141  |  106  |  13  ----------------------------<  126[H]  2.7[LL]   |  25  |  0.8    Ca    10.0      07 Nov 2024 16:50  Mg     1.7     11-07    TPro  5.8[L]  /  Alb  3.1[L]  /  TBili  0.6  /  DBili  x   /  AST  14  /  ALT  6   /  AlkPhos  72  11-07    PT/INR - ( 07 Nov 2024 21:37 )   PT: 18.60 sec;   INR: 1.56 ratio         PTT - ( 07 Nov 2024 21:37 )  PTT:34.2 sec  LIVER FUNCTIONS - ( 07 Nov 2024 16:50 )  Alb: 3.1 g/dL / Pro: 5.8 g/dL / ALK PHOS: 72 U/L / ALT: 6 U/L / AST: 14 U/L / GGT: x           Urinalysis Basic - ( 07 Nov 2024 16:50 )    Color: x / Appearance: x / SG: x / pH: x  Gluc: 126 mg/dL / Ketone: x  / Bili: x / Urobili: x   Blood: x / Protein: x / Nitrite: x   Leuk Esterase: x / RBC: x / WBC x   Sq Epi: x / Non Sq Epi: x / Bacteria: x            IMAGING:  < from: CT Abdomen and Pelvis w/ IV Cont (11.07.24 @ 20:01) >  Diffuse distention of the sigmoid colon. Possible area of sigmoid colon   twisting which may represent a sigmoid volvulus.    Urinary bladder inflammatory changes which can be seen with cystitis.   Recommend correlation with urinalysis    < end of copied text >  
Gastroenterology Initial Consult Note      Location: [facility] ED ([facility])  Patient Name: RAMA MARMOLEJO  Age: 89y  Gender: Female      Chief Complaint  Patient is a 89y old Female who presents with a chief complaint of   Primary diagnosis of       Reason for Consult  Sigmoid Volvolus      History of Present Illness  89-year-old female patient with history of hypertension, dyslipidemia, sinus bradycardia status post permanent pacemaker several years ago, congestive heart failure status post AICD, possible history of DVT status post IVC filter on Eliquis, and recent left hip fracture status post replacement around 10 days ago at [facility] who was brought to the ED on November 7 for evaluation of hypokalemia in the setting of 2-day history of colonic ileus on KUB at the nursing home.  She was found to have hypokalemia and evidence of questionable twisting at the sigmoid suggestive of volvulus with moderate gaseous distention of the colon.  We are contacted in the setting of concern of sigmoid volvulus.  Summary  * Was admitted recently to [facility] for left hip fracture status post left hip replacement around 10 days ago  * Reports chronic constipation with 2 days of left lower quadrant discomfort (nonradiating, mild around 2/10 in intensity); last flatus 2 days ago; no nausea or vomiting or fever or chills; reports intentional subjective weight loss; no melena or hematochezia; no dysphagia.  She was evaluated at the nursing home: Status post KUB on November 5 in November 6 showing stable moderate colonic ileus.  Status post blood work showing potassium of 2.5 at the nursing home on November 5  * Currently hemodynamically stable  * Labs: WBC 9.27, hemoglobin 10.3, platelet 313, sodium 141, potassium 2.7 status post IV 20 mics x 3 and p.o. 40 mics x 1, BUN 13, creatinine 0.8, magnesium 1.7 status post 2 g IV x 1  * INR pending  * Lactate 0.7  * CT abdomen pelvis with IV contrast revealed questionable twisting of the sigmoid mesentery suggestive of volvulus, moderate gaseous distention of the colon with liquid stool in the rectum, right lower quadrant calcified 2.6 cm density  * No previous EGD or colonoscopy per patient and nephew  * No family history of GI malignancies      Prior EGD:  no prior      Prior Colonoscopy:  no prior      Past Medical and Surgical History:  High cholesterol    HTN (hypertension)    Pacemaker    AICD (automatic cardioverter/defibrillator) present    S/P hip replacement  b/l    S/P knee replacement    S/P cataract surgery        Home Medications:  Home Medications:  amLODIPine 2.5 mg oral tablet: 1 orally (08 Sep 2023 09:39)  aspirin 81 mg oral tablet, chewable: 1 tab(s) orally once a day (08 Sep 2023 09:39)  carvedilol 25 mg oral tablet: 1 tab(s) orally 2 times a day (08 Sep 2023 09:39)  furosemide 20 mg oral tablet: 1 orally (08 Sep 2023 09:39)  Linzess 145 mcg oral capsule: 1 orally (08 Sep 2023 09:39)  losartan 25 mg oral tablet: 1 orally (08 Sep 2023 09:39)      Social History:  Tobacco: denies  Alcohol: denies  Drugs: denies      Allergies:  codeine (Unknown)  penicillin (Unknown)      Family History:  No pertinent family history in first degree relatives        Vital Signs in the last 24 hours   Vitals Summary T(C): 37.3 (11-07-24 @ 15:38), Max: 37.3 (11-07-24 @ 15:38)  HR: 60 (11-07-24 @ 15:38) (60 - 60)  BP: 130/64 (11-07-24 @ 15:38) (130/64 - 130/64)  RR: 18 (11-07-24 @ 15:38) (18 - 18)  SpO2: 98% (11-07-24 @ 15:38) (98% - 98%)  Vent Data   Intake/ Output   Measurements   Weight (kg): 61.2 (11-07-24 @ 15:38)      Physical Exam  * General Appearance: Alert, cooperative, interactive, oriented to time, place, and person, in no acute distress  * Eyes: PERRL, conjunctiva/corneas clear, EOM's intact, fundi benign, both eyes   * Lungs: Good bilateral air entry, normal breath sounds   * Heart: Regular Rate and Rhythm, normal S1 and S2, no audible murmur, rub, or gallop  * Abdomen: Symmetric, markedly softly distended, no scar, soft, non-tender, bowel sounds active all four quadrants, no masses, no organomegaly (no hepatosplenomegaly)        Investigations   Laboratory Workup      - CBC:                        10.3   9.27  )-----------( 313      ( 07 Nov 2024 16:50 )             31.2       - Hgb Trend:  10.3  11-07-24 @ 16:50        - Chemistry:  11-07    141  |  106  |  13  ----------------------------<  126[H]  2.7[LL]   |  25  |  0.8    Ca    10.0      07 Nov 2024 16:50  Mg     1.7     11-07    TPro  5.8[L]  /  Alb  3.1[L]  /  TBili  0.6  /  DBili  x   /  AST  14  /  ALT  6   /  AlkPhos  72  11-07    Liver panel trend:  TBili 0.6   /   AST 14   /   ALT 6   /   AlkP 72   /   Tptn 5.8   /   Alb 3.1    /   DBili --      11-07      - Coagulation Studies:  PT/INR - ( 07 Nov 2024 21:37 )   PT: 18.60 sec;   INR: 1.56 ratio         PTT - ( 07 Nov 2024 21:37 )  PTT:34.2 sec    - ABG:      - Cardiac Markers:        Microbiological Workup  Urinalysis Basic - ( 07 Nov 2024 16:50 )    Color: x / Appearance: x / SG: x / pH: x  Gluc: 126 mg/dL / Ketone: x  / Bili: x / Urobili: x   Blood: x / Protein: x / Nitrite: x   Leuk Esterase: x / RBC: x / WBC x   Sq Epi: x / Non Sq Epi: x / Bacteria: x          Radiological Workup  CT Abdomen and Pelvis w/ IV Cont:   ACC: [id number] EXAM:  CT ABDOMEN AND PELVIS IC   ORDERED BY: RAVI PALOMARES     PROCEDURE DATE:  11/07/2024          INTERPRETATION:  CLINICAL INFORMATION: Abdominal distention    COMPARISON: None.    CONTRAST/COMPLICATIONS:  IV Contrast: Omnipaque 350  100 cc administered   0 cc discarded  Oral Contrast: NONE  Complications: None reported at time of study completion    PROCEDURE:  CT of the Abdomen and Pelvis was performed.  Sagittal and coronal reformats were performed.    FINDINGS:  LOWER CHEST: Bibasilar reticulations. Cardiomegaly. Partially visualized   cardiac lead    LIVER: Within normal limits.  BILE DUCTS: Normal caliber.  GALLBLADDER: Within normal limits.  SPLEEN: Within normal limits.  PANCREAS: Within normal limits.  ADRENALS: Within normal limits.  KIDNEYS/URETERS: Renal cysts and other subcentimeter hypodensities, too   small to further characterize    BLADDER: Chaney catheter within the urinary bladder. Perivesicular fat   stranding  REPRODUCTIVE ORGANS: Calcified uterus    BOWEL: Markedly gaseous distention of the colon with liquid stool within   the rectum. Questionable twisting of the sigmoid colon mesentery (series   4, image 266)  PERITONEUM/RETROPERITONEUM: Right lower quadrant 2.6 cm calcified density  VESSELS: IVC filter.  LYMPH NODES: No lymphadenopathy.  ABDOMINAL WALL: Within normal limits.  BONES: Status post left total hip arthroplasty. Osteopenia. Scoliosis   with multilevel degenerative changes    IMPRESSION:  Diffuse distention of the sigmoid colon. Possible area of sigmoid colon   twisting which may represent a sigmoid volvulus.    Urinary bladder inflammatory changes which can be seen with cystitis.   Recommend correlation with urinalysis    --- End of Report ---            INA YAÑEZ MD;Attending Radiologist  This document has been electronically signed. Nov 7 2024  8:39PM (11-07-24 @ 20:01)            Current Medications  Standing Medications  potassium chloride  20 mEq/100 mL IVPB 20 milliEquivalent(s) IV Intermittent every 2 hours    PRN Medications    Singles Doses Administered  lactated ringers Bolus 1000 milliLiter(s) IV Bolus once  magnesium sulfate  IVPB 2 Gram(s) IV Intermittent Once  potassium chloride   Powder 40 milliEquivalent(s) Oral Once      
CC: hypokalemia    HPI:  89-year-old female patient with history of hypertension, dyslipidemia, sinus bradycardia status post permanent pacemaker several years ago, congestive heart failure status post AICD, possible history of DVT status post IVC filter on Eliquis, and recent left hip fracture status post replacement around 10 days ago at [facility] who was brought to the ED on November 7 for evaluation of hypokalemia in the setting of 2-day history of colonic ileus on KUB at the nursing home.  She was found to have hypokalemia and evidence of questionable twisting at the sigmoid suggestive of volvulus with moderate gaseous distention of the colon.  Patient being admitted for urgent surgical management. CONFIRM MED RECC IN AM    ED course:   · BP Systolic  130 mm Hg  · BP Diastolic  64 mm Hg  · Heart Rate  60 /min  · Respiration Rate (breaths/min)  18 /min  · Temp (F)  99.1 Degrees F  · Temp (C)  37.3 Degrees C  · Temp site  oral  · SpO2 (%)  98 %  · O2 Delivery/Oxygen Delivery Method  room air  · Temp at ED Arrival (C)  37.3 Degrees C    CTAP IV cont:   IMPRESSION:  Diffuse distention of the sigmoid colon. Possible area of sigmoid colon   twisting which may represent a sigmoid volvulus.    Urinary bladder inflammatory changes which can be seen with cystitis.   Recommend correlation with urinalysis     (07 Nov 2024 23:09)    PERTINENT PM/SXH:   High cholesterol    HTN (hypertension)    Pacemaker    AICD (automatic cardioverter/defibrillator) present      S/P hip replacement    S/P knee replacement    S/P cataract surgery      FAMILY HISTORY:  No pertinent family history in first degree relatives      None pertinent    ITEMS NOT CHECKED ARE NOT PRESENT    SOCIAL HISTORY:   Significant other/partner[ ]  Children[ ]  Quaker/Spirituality:  Substance hx:  [ ]   Tobacco hx:  [ ]   Alcohol hx: [ ]   Living Situation: [x ]Home  [ ]Long term care  [ ]Rehab [ ]Other  Home Services: [ ] HHA [ ] Ruben RN [ ] Hospice  Occupation:  Home Opioid hx:  [ ] Y [ ] N [ x] I-Stop Reference No:    Reference #: [id number] - no meds     ADVANCE DIRECTIVES:     [ ] Full Code [ x] DNR  MOLST  [ ]  Living Will  [ ]   DECISION MAKER(s):  [x ] Health Care Proxy(s)  [ ] Surrogate(s)  [ ] Guardian           Name(s): Phone Number(s):  Miriam Bryant      BASELINE (I)ADL(s) (prior to admission):    [place]: [ ]Total  [ ] Moderate [ ]Dependent  Palliative Performance Status Version 2:         %    [email or web address]/[email or web address]_performance_scale_[email or web address].pdf    Allergies    codeine (Unknown)  penicillin (Unknown)    Intolerances    MEDICATIONS  (STANDING):  amLODIPine   Tablet 5 milliGRAM(s) Oral daily  aspirin  chewable 81 milliGRAM(s) Oral daily  bisacodyl 10 milliGRAM(s) Oral at bedtime  carvedilol 12.5 milliGRAM(s) Oral every 12 hours  chlorhexidine 2% Cloths 1 Application(s) Topical <User Schedule>  enoxaparin Injectable 40 milliGRAM(s) SubCutaneous every 24 hours  losartan 25 milliGRAM(s) Oral daily  magnesium oxide 400 milliGRAM(s) Oral two times a day with meals  memantine 5 milliGRAM(s) Oral daily  polyethylene glycol 3350 17 Gram(s) Oral every 12 hours  potassium chloride   Powder 20 milliEquivalent(s) Oral daily  senna 2 Tablet(s) Oral daily  zinc oxide 20% Ointment 1 Application(s) Topical two times a day    MEDICATIONS  (PRN):  ondansetron Injectable 4 milliGRAM(s) IV Push once PRN Nausea and/or Vomiting    PRESENT SYMPTOMS: [ ]Unable to obtain due to poor mentation   Source if other than patient:  [ ]Family   [ ]Team     Pain: [ ]yes [x ]no  ALL PAIN ASSESSMENT COMPONENTS WERE ASKED ABOUT UNLESS OTHERWISE NOTED  QOL impact -   Location -                    Aggravating factors -  Quality -  Radiation -  Timing-  Severity (0-10 scale):  Minimal acceptable level (0-10 scale):     CPOT:    https://www.[facility].org/getattachment/moc39h95-4l5a-7o5p-9t2f-1812o5171q0z/Critical-Care-Pain-Observation-Tool-(CPOT)    PAIN AD Score:   http://geriatrictoolkit.[facility]/cog/painad.pdf (press ctrl +  left click to view)    Dyspnea:                           [x ]None[ ]Mild [ ]Moderate [ ]Severe     Respiratory Distress Observation Scale (RDOS):   A score of 0 to 2 signifies little or no respiratory distress, 3 signifies mild distress, scores 4 to 6 indicate moderate distress, and scores greater than 7 signify severe distress  [email or web address]://[email or web address]/sites/default/files/PDFS/479877-ybpdjttgyjg-bxhotsqc-bkbiufiivey-duwim.pdf    Anxiety:                             [x ]None[ ]Mild [ ]Moderate [ ]Severe   Fatigue:                             [x ]None[ ]Mild [ ]Moderate [ ]Severe   Nausea:                             [x ]None[ ]Mild [ ]Moderate [ ]Severe   Loss of appetite:              [x ]None[ ]Mild [ ]Moderate [ ]Severe   Constipation:                    [x ]None[ ]Mild [ ]Moderate [ ]Severe    Other Symptoms:  [ x]All other review of systems negative     Palliative Performance Status Version 2:         20%    http://[email or web address]/files/news/palliative_performance_scale_[email or web address].pdf    PHYSICAL EXAM:  Vital Signs Last 24 Hrs  T(C): 36.9 (20 Nov 2024 12:25), Max: 36.9 (20 Nov 2024 12:25)  T(F): 98.5 (20 Nov 2024 12:25), Max: 98.5 (20 Nov 2024 12:25)  HR: 69 (20 Nov 2024 12:25) (59 - 89)  BP: 153/80 (20 Nov 2024 12:25) (127/64 - 171/72)  BP(mean): --  RR: 18 (20 Nov 2024 12:25) (18 - 18)  SpO2: 99% (20 Nov 2024 12:25) (95% - 99%)    Parameters below as of 20 Nov 2024 12:25  Patient On (Oxygen Delivery Method): room air     I&O's Summary    19 Nov 2024 07:01  -  20 Nov 2024 07:00  --------------------------------------------------------  IN: 0 mL / OUT: 350 mL / NET: -350 mL        GENERAL:  [ ] No acute distress [ ]Lethargic  [ ]Unarousable  [ x]Verbal  [ ]Non-Verbal [ ]Cachexia    BEHAVIORAL/PSYCH:  [x ]Alert and Oriented x2  [ ] Anxiety [ ] Delirium [ ] Agitation [ ] Calm   EYES: [ ] No scleral icterus [ ] Scleral icterus [ ] Closed  ENMT:  [ ]Dry mouth  [x ]No external oral lesions [ ] No external ear or nose lesions  CARDIOVASCULAR:  [ ]Regular [ ]Irregular [ ]Tachy [ ]Not Tachy  [ ]Jaden [ ] Edema [ ] No edema  PULMONARY:  [ ]Tachypnea  [ ]Audible excessive secretions [ x] No labored breathing [ ] labored breathing  GASTROINTESTINAL: [ ]Soft  [ ]Distended  [ ]Not distended [ ]Non tender [ ]Tender  MUSCULOSKELETAL: [ ]No clubbing [ ] clubbing  [ ] No cyanosis [ ] cyanosis  NEUROLOGIC: [ ]No focal deficits  [ x]Follows commands  [ ]Does not follow commands  [x ]Cognitive impairment  [ ]Dysphagia  [ ]Dysarthria  [ ]Paresis   SKIN: [ ] Jaundiced [ ] Non-jaundiced [ ]Rash [ ]No Rash [ ] Warm [ ] Dry  MISC/LINES: [ ] ET tube [ ] Trach [ ]NGT/OGT [ ]PEG [ ]Chaney    LABS: reviewed by me                        11.0   7.63  )-----------( 297      ( 19 Nov 2024 05:19 )             33.2   11-19    138  |  102  |  8[L]  ----------------------------<  82  3.0[L]   |  22  |  0.5[L]    Ca    10.4      19 Nov 2024 05:19  Mg     2.1     11-19        Urinalysis Basic - ( 19 Nov 2024 05:19 )    Color: x / Appearance: x / SG: x / pH: x  Gluc: 82 mg/dL / Ketone: x  / Bili: x / Urobili: x   Blood: x / Protein: x / Nitrite: x   Leuk Esterase: x / RBC: x / WBC x   Sq Epi: x / Non Sq Epi: x / Bacteria: x      RADIOLOGY & ADDITIONAL STUDIES: reviewed by me    < from: Xray Kidney Ureter Bladder (11.19.24 @ 10:28) >  FINDINGS/  IMPRESSION:    Stable rectal tube. Nonobstructive bowel gas pattern. Stable IVC filter,   bones and hardware. Stable exam.    < end of copied text >      EKG: reviewed by me    < from: 12 Lead ECG (11.07.24 @ 18:52) >    Ventricular Rate 60 BPM    Atrial Rate 60 BPM    QRS Duration 142 ms    Q-T Interval 436 ms    QTC Calculation(Bazett) 436 ms    P Axis -27 degrees    R Axis -66 degrees    T Axis 17 degrees    Diagnosis Line Ventricular-paced rhythm  Abnormal ECG    < end of copied text >      PROTEIN CALORIE MALNUTRITION PRESENT: [ ]mild [ ]moderate [ ]severe [ ]underweight [ ]morbid obesity  https://www.andeal.org/vault/2440/web/files/ONC/Table_Clinical%20Characteristics%20to%20Document%20Malnutrition-White%20JV%20et%20al%202012.pdf      Weight (kg): 61.2 (11-07-24 @ 23:08)  [ ]PPSV2 < or = to 30% [ ]significant weight loss  [ ]poor nutritional intake  [ ]anasarca      [ ]Artificial Nutrition      Palliative Care Spiritual/Emotional Screening Tool Question  Severity (0-4):                    OR                    [ x] Unable to determine. Will assess at later time if appropriate.  Score of 2 or greater indicates recommendation of Chaplaincy and/or SW referral  Chaplaincy Referral: [ ] Yes [ ] Refused [ ] Following     Caregiver [place]:  [ ] Yes [ ] No    OR    [x ] Unable to determine. Will assess at later time if appropriate.  Social Work Referral [ ]  Patient and Family Centered Care Referral [ ]    Anticipatory Grief Present: [ ] Yes [ ] No    OR     [ x] Unable to determine. Will assess at later time if appropriate.  Social Work Referral [ ]  Patient and Family Centered Care Referral [ ]    Patient discussed with primary medical team MD  Palliative care education provided to patient and/or family

## 2024-11-20 NOTE — PROGRESS NOTE ADULT - REASON FOR ADMISSION
low potassium and abd pain, constipation

## 2024-11-20 NOTE — CHART NOTE - NSCHARTNOTEFT_GEN_A_CORE
Orthopedic Surgery    s/p left femur ORIF at [facility]  patient was supposed to follow up two days ago for remainder of staple removal but has been inpatient here  incision c/d/i with every other staple in place  remainder of staples removed today- incision covered with steri strips   f/u with  ([facility]) upon d/c

## 2024-11-20 NOTE — HOSPICE CARE NOTE - CONVESATION DETAILS
Update to D/C plan. Patient; s [facility] has accepted home hospice services. D/C patient home Thursday afternoon. Patient's HHA services need to be reinstated. Hospice to admit to service Friday morning.

## 2024-11-20 NOTE — PROGRESS NOTE ADULT - SUBJECTIVE AND OBJECTIVE BOX
Gastroenterology Follow Up Note      Location: [facility] 3A 013 A ([facility] 3A)  Patient Name: RAMA MARMOLEJO  Age: 89y  Gender: Female      Chief Complaint  Patient is a 89y old Female who presents with a chief complaint of low potassium and abd pain, constipation (16 Nov 2024 12:41)  Primary diagnosis of Hypokalemia      Reason for Consult  Colonic Distention      Progress Note  This morning patient was seen and examined at bedside.    Today is hospital day 13d.  Patient had increased abdominal distention on 11/16 s/p rectal tube placement (draining brown stools on 11/17).  The rectal tube was dislodged on 11/18 at 4 AM and replaced at 06:30 AM on 11/18.  She notes improvement/near resolution of pressure-like abdominal discomfort.  She has otherwise been tolerating PO diet and denied nausea or vomiting.      Vital Signs in the last 24 hours  T(C): 36.9 (20 Nov 2024 12:25), Max: 36.9 (20 Nov 2024 12:25)  T(F): 98.5 (20 Nov 2024 12:25), Max: 98.5 (20 Nov 2024 12:25)  HR: 69 (20 Nov 2024 12:25) (59 - 69)  BP: 153/80 (20 Nov 2024 12:25) (153/80 - 171/72)  BP(mean): --  ABP: --  ABP(mean): --  RR: 18 (20 Nov 2024 12:25) (18 - 18)  SpO2: 99% (20 Nov 2024 12:25) (96% - 99%)    O2 Parameters below as of 20 Nov 2024 12:25  Patient On (Oxygen Delivery Method): room air        Physical Exam  * General Appearance: Alert, cooperative, interactive but hard of hearing, oriented to time, place, and person, in no acute distress  * Eyes: PERRL, conjunctiva/corneas clear, EOM's intact, fundi benign, both eyes  * Lungs: Good bilateral air entry, normal breath sounds (Clear to auscultation bilaterally, no audible wheezes, crackles, or rhonchi)  * Heart: Regular Rate and Rhythm, normal S1 and S2, no audible murmur, rub, or gallop  * Abdomen: Symmetric, soft, non distended, non-tender, bowel sounds active all four quadrants  * Rectal: s/p rectal tube replacement on 11/18 with drainage of yellow brown stools; air heard on insertion of finger      Investigations                          11.0   7.63  )-----------( 297      ( 19 Nov 2024 05:19 )             33.2     11-19    138  |  102  |  8[L]  ----------------------------<  82  3.0[L]   |  22  |  0.5[L]    Ca    10.4      19 Nov 2024 05:19  Mg     2.1     11-19                Urinalysis Basic - ( 19 Nov 2024 05:19 )    Color: x / Appearance: x / SG: x / pH: x  Gluc: 82 mg/dL / Ketone: x  / Bili: x / Urobili: x   Blood: x / Protein: x / Nitrite: x   Leuk Esterase: x / RBC: x / WBC x   Sq Epi: x / Non Sq Epi: x / Bacteria: x      Current Medications  Standing Medications  amLODIPine   Tablet 5 milliGRAM(s) Oral daily  aspirin  chewable 81 milliGRAM(s) Oral daily  bisacodyl 10 milliGRAM(s) Oral at bedtime  carvedilol 12.5 milliGRAM(s) Oral every 12 hours  chlorhexidine 2% Cloths 1 Application(s) Topical <User Schedule>  enoxaparin Injectable 40 milliGRAM(s) SubCutaneous every 24 hours  losartan 25 milliGRAM(s) Oral daily  magnesium oxide 400 milliGRAM(s) Oral two times a day with meals  memantine 5 milliGRAM(s) Oral daily  polyethylene glycol 3350 17 Gram(s) Oral every 12 hours  potassium chloride   Powder 20 milliEquivalent(s) Oral daily  senna 2 Tablet(s) Oral daily    PRN Medications  ondansetron Injectable 4 milliGRAM(s) IV Push once PRN Nausea and/or Vomiting    Singles Doses Administered  (ADM OVERRIDE) 1 each &lt;see task&gt; GiveOnce  (ADM OVERRIDE) 1 each &lt;see task&gt; GiveOnce  (ADM OVERRIDE) 1 each &lt;see task&gt; GiveOnce  (ADM OVERRIDE) 1 each &lt;see task&gt; GiveOnce  (ADM OVERRIDE) 2 each &lt;see task&gt; GiveOnce  (ADM OVERRIDE) 2 each &lt;see task&gt; GiveOnce  acetaminophen     Tablet .. 650 milliGRAM(s) Oral once PRN  amLODIPine   Tablet 5 milliGRAM(s) Oral once  lactated ringers Bolus 1000 milliLiter(s) IV Bolus once  magnesium sulfate  IVPB 2 Gram(s) IV Intermittent once  magnesium sulfate  IVPB 2 Gram(s) IV Intermittent Once  magnesium sulfate  IVPB 2 Gram(s) IV Intermittent once  midazolam  1 mG/mL Injectable (Rx Quick Charge) 2 milliGRAM(s) IV Push   potassium chloride   Powder 40 milliEquivalent(s) Oral Once  potassium chloride   Powder 40 milliEquivalent(s) Oral two times a day  potassium chloride   Powder 40 milliEquivalent(s) Oral every 2 hours  potassium chloride  20 mEq/100 mL IVPB 20 milliEquivalent(s) IV Intermittent once  potassium chloride  20 mEq/100 mL IVPB 20 milliEquivalent(s) IV Intermittent every 2 hours  potassium chloride  20 mEq/100 mL IVPB 20 milliEquivalent(s) IV Intermittent once  potassium chloride  20 mEq/100 mL IVPB 20 milliEquivalent(s) IV Intermittent every 2 hours  potassium chloride  20 mEq/100 mL IVPB 20 milliEquivalent(s) IV Intermittent every 2 hours  potassium chloride  20 mEq/100 mL IVPB 20 milliEquivalent(s) IV Intermittent every 2 hours

## 2024-11-20 NOTE — PROGRESS NOTE ADULT - SUBJECTIVE AND OBJECTIVE BOX
Patient is a 89y old Female who presents with a chief complaint of low potassium and abd pain, constipation (19 Nov 2024 15:50)      INTERVAL HPI/OVERNIGHT EVENTS: No acute events overnight. Patient reports resolution of abdominal pain, able to pass flatus. Patient denies fever, chills, headache, dizziness, weakness, nausea, vomiting, diarrhea.       REVIEW OF SYSTEMS:  CONSTITUTIONAL: No fever, weight loss, or fatigue  EYES: No eye pain, visual disturbances, or discharge  ENMT:  No difficulty hearing, tinnitus, vertigo; No sinus or throat pain  NECK: No pain or stiffness  BREASTS: No pain, masses, or nipple discharge  RESPIRATORY: No cough, wheezing, chills or hemoptysis; No shortness of breath  CARDIOVASCULAR: No chest pain, palpitations, dizziness, or leg swelling  GASTROINTESTINAL: No abdominal or epigastric pain. No nausea, vomiting, or hematemesis; No diarrhea or constipation. No melena or hematochezia.  GENITOURINARY: No dysuria, frequency, hematuria, or incontinence  NEUROLOGICAL: No headaches, memory loss, loss of strength, numbness, or tremors  SKIN: No itching, burning, rashes, or lesions   LYMPH NODES: No enlarged glands  ENDOCRINE: No heat or cold intolerance; No hair loss  MUSCULOSKELETAL: No joint pain or swelling; No muscle, back, or extremity pain  PSYCHIATRIC: No depression, anxiety, mood swings, or difficulty sleeping  HEME/LYMPH: No easy bruising, or bleeding gums  ALLERY AND IMMUNOLOGIC: No hives or eczema  FAMILY HISTORY:  No pertinent family history in first degree relatives      T(C): 36.7 (11-20-24 @ 05:08), Max: 36.8 (11-19-24 @ 20:00)  HR: 59 (11-20-24 @ 05:08) (59 - 89)  BP: 171/72 (11-20-24 @ 05:08) (127/64 - 171/72)  RR: 18 (11-20-24 @ 05:08) (17 - 18)  SpO2: 96% (11-20-24 @ 05:08) (95% - 97%)  Wt(kg): --Vital Signs Last 24 Hrs  T(C): 36.7 (20 Nov 2024 05:08), Max: 36.8 (19 Nov 2024 20:00)  T(F): 98.1 (20 Nov 2024 05:08), Max: 98.3 (19 Nov 2024 20:00)  HR: 59 (20 Nov 2024 05:08) (59 - 89)  BP: 171/72 (20 Nov 2024 05:08) (127/64 - 171/72)  BP(mean): --  RR: 18 (20 Nov 2024 05:08) (17 - 18)  SpO2: 96% (20 Nov 2024 05:08) (95% - 97%)    Parameters below as of 20 Nov 2024 05:08  Patient On (Oxygen Delivery Method): room air        PHYSICAL EXAM:  GENERAL: NAD, well-groomed, well-developed  HEAD:  Atraumatic, Normocephalic  EYES: EOMI, PERRLA, conjunctiva and sclera clear  ENMT: No tonsillar erythema, exudates, or enlargement; Moist mucous membranes, Good dentition, No lesions  NECK: Supple, No JVD, Normal thyroid  NERVOUS SYSTEM:  Alert & Oriented X3, Good concentration; Motor Strength 5/5 B/L upper and lower extremities; DTRs 2+ intact and symmetric  CHEST/LUNG: Clear to percussion bilaterally; No rales, rhonchi, wheezing, or rubs  HEART: Regular rate and rhythm; No murmurs, rubs, or gallops  ABDOMEN: Soft, Nontender, Nondistended; Bowel sounds present  EXTREMITIES:  2+ Peripheral Pulses, No clubbing, cyanosis, or edema  LYMPH: No lymphadenopathy noted  SKIN: No rashes or lesions    Consultant(s) Notes Reviewed:  [x ] YES  [ ] NO  Care Discussed with Consultants/Other Providers [ x] YES  [ ] NO    LABS:                        11.0   7.63  )-----------( 297      ( 19 Nov 2024 05:19 )             33.2     11-19    138  |  102  |  8[L]  ----------------------------<  82  3.0[L]   |  22  |  0.5[L]    Ca    10.4      19 Nov 2024 05:19  Mg     2.1     11-19            RADIOLOGY & ADDITIONAL TESTS:    Imaging Personally Reviewed:  [ ] YES  [ ] NO  amLODIPine   Tablet 5 milliGRAM(s) Oral daily  aspirin  chewable 81 milliGRAM(s) Oral daily  bisacodyl 10 milliGRAM(s) Oral at bedtime  carvedilol 12.5 milliGRAM(s) Oral every 12 hours  chlorhexidine 2% Cloths 1 Application(s) Topical <User Schedule>  enoxaparin Injectable 40 milliGRAM(s) SubCutaneous every 24 hours  losartan 25 milliGRAM(s) Oral daily  magnesium oxide 400 milliGRAM(s) Oral two times a day with meals  memantine 5 milliGRAM(s) Oral daily  ondansetron Injectable 4 milliGRAM(s) IV Push once PRN  polyethylene glycol 3350 17 Gram(s) Oral every 12 hours  potassium chloride   Powder 20 milliEquivalent(s) Oral daily  senna 2 Tablet(s) Oral daily  zinc oxide 20% Ointment 1 Application(s) Topical two times a day

## 2024-11-20 NOTE — PROGRESS NOTE ADULT - ASSESSMENT
89 yo F with hx of HTN, HLD, CHB s/p DC PPM s/p BiV ICD upgrade, Chronic A.fib (not on AC due to recurrent fall) and HFpEF (G3DD) presents to ED for generalized abdominal pain, constipation and hypokalemia.       # abd distention, Chronic intestinal pseudo-obstruction  # Acute Sigmoid Volvulus s/p sigmoidoscopy decompression   # Hypokalemia / Hypomagnesemia   #Acute Cystitis ruled out   #Chronic A.fib mpt on AC / HFimpEF s/p AICD BiV   # HTN  HLD       PLANs:    - symptoms improved s/p sigmoidoscopy decompression and rectal tube   - was tolerating diet and passed BMs till 11/16, had abd distention and KUB showed increase distention  - s/p rectal tube 11/16   - GI aware, was planned for a c-scope, patient cannot tolerate prep with NGT  - Xray KUB: Interval placement of rectal catheter. Decreased gaseous distention of  the colon.  - Repeat KUB daily, rectal tube per GI, possible removal tomorrow AM  - c/w senna, miralax, dulcolax,   - keep K >4 and Mg > 2, on PO supplements   - f/u BMP, CBC, Mg  - C/w potassium chloride 20 mEq/100 mL IVPB. q2h, infuse over 120 minutes, stop after 3 doses   - Increase K+ to 40 mEq powder PO, DC on PO supplements  - Monitor and replete electrolytes  - c/w ASA/ Amlodipine / Losartan / atorvastatin / Coreg dec 12.5 mg w holding parameter    - DVT ppx Lovenox   - DNR/DNI  - Pending: clinical improvement of abd distention, possible removal of rectal tube  - palliative for GOCs  - pending hospice eval

## 2024-11-20 NOTE — PROGRESS NOTE ADULT - ATTENDING COMMENTS
89 yo F with hx of HTN, HLD, CHB s/p DC PPM s/p BiV ICD upgrade, Chronic A.fib (not on AC due to recurrent fall) and HFpEF (G3DD) presents to ED for generalized abdominal pain, constipation and hypokalemia.     # abd distention, Chronic intestinal pseudo-obstruction  # Acute Sigmoid Volvulus s/p sigmoidoscopy decompression   # Hypokalemia / Hypomagnesemia   #Acute Cystitis ruled out   #Chronic A.fib mpt on AC / HFimpEF s/p AICD BiV   # HTN  HLD     PLANs:  - today pt became CMO and on going plans for Home Hospice   - symptoms resolved s/p sigmoidoscopy decompression and rectal tube   - was tolerating diet and passed BMs till 11/16, had abd distention and KUB showed increase distention  - s/p rectal tube 11/16 will continue till time to d/c home with hospice   - GI aware, was planned for c-scope, cant tolerate prep even w NGT,   - d/c daily KUBs   - c/w Rectal tube till d/c home GI can remove it before d/c   - c/w senna, miralax, dulcolax,   - keep K >4 and Mg > 2, on PO supplements (increase K to 40 meq ) should DC pt on PO supplements   - d/c ASA/ Amlodipine / Losartan / atorvastatin / Coreg dec 12.5 mg w holding parameter    - all care per palliative recs / CMO status and No Lab draws / Oral Feeding advance diet as tolerated   - DNR/DNI    Social case and plan discussed with Nephew and Pallative care at bedside / Pt wants to be left comfortable to and go home with hospice care   Nephew undersands all care will be deascalated in preparation for Hospice     Nephew Ernie Gunter [phone] and Miriam who is the niece [phone].     Pending: Home Hospice Arrangements   Dispo: Home when above done
Note reviewed and edited
I edited the note
I edited the note
I edited the note.  Time-based billing (NON-critical care).   50 minutes spent on total encounter; more than 50% of the visit was spent counseling and / or coordinating care by the attending physician.  The necessity of the time spent during the encounter on this date of service was due to: Coordination of care.
I edited the note
Pt with recurrent abdominal distension and colonic dilation likely CIPO. Rectal tube replaced with gaseous output. Continue serial abd exams, follow up kub and surgery follow up. Continue to monitor/correct electrolytes.
I edited the note
Note reviewed and edited
Note reviewed and edited

## 2024-11-20 NOTE — PROGRESS NOTE ADULT - PROVIDER SPECIALTY LIST ADULT
Gastroenterology
Hospitalist
Hospitalist
Internal Medicine
Gastroenterology
Hospitalist
Internal Medicine
Gastroenterology
Hospitalist
Internal Medicine
Surgery
Gastroenterology
Hospitalist
Internal Medicine
Internal Medicine
Hospitalist

## 2024-11-20 NOTE — CONSULT NOTE ADULT - ASSESSMENT
89yFemale with history of CHB s/p PMPM and ICD upgrade, chronic afib, HTN, HLD and HFpEF presents with abdominal pain and constipation. Patient with chronic intestinal pseudoobstruction and volvulus. Patient with poor PO intake. Palliative care consulted for [place] [facility].    Spoke with patient and family at bedside. Miriam was over the phone. They discussed not wishing to pursue aggressive medical management, surgical intervention, or artificial nutrition. The patient wishes to go home. We discussed CMO and hospice.  Patient/family wishes to pursue CMO, hospice care, and deactivation of AICD.  All questions answered.    Recommendations  -DNR/DNI  -Comfort measures only  -deactivation of AICD  -MOLST filled out and placed in chart  -No additional IVF, artificial nutrition, blood draws, vital signs, pressors, antibiotics, escalation of oxygen, escalation of care  -Comfort feeds OK  -hospice consulted  -will follow        MEDD (morphine equivalent daily dose):    Education about palliative care provided to patient/family.  See Recs below.    Please call [phone] with questions or concerns 24/7.   We will continue to follow.    89yFemale with history of CHB s/p PMPM and ICD upgrade, chronic afib, HTN, HLD and HFpEF presents with abdominal pain and constipation. Patient with chronic intestinal pseudoobstruction and volvulus. Patient with poor PO intake. Palliative care consulted for [facility].    Spoke with patient and family at bedside. Miriam was over the phone. They discussed not wishing to pursue aggressive medical management, surgical intervention, or artificial nutrition. The patient wishes to go home. We discussed CMO and hospice.  Patient/family wishes to pursue CMO, hospice care, and deactivation of AICD.  All questions answered.    Recommendations  -DNR/DNI  -Comfort measures only  -deactivation of AICD  -MOLST filled out and placed in chart  -No additional IVF, artificial nutrition, blood draws, vital signs, pressors, antibiotics, escalation of oxygen, escalation of care  -Comfort feeds OK  -hospice consulted  -morphine 5mg SL q4h PRN for pain/dyspnea (reaction is itching)  -will follow        MEDD (morphine equivalent daily dose):    Education about palliative care provided to patient/family.  See Recs below.    Please call [phone] with questions or concerns 24/7.   We will continue to follow.

## 2024-11-21 ENCOUNTER — TRANSCRIPTION ENCOUNTER (OUTPATIENT)
Age: 89
End: 2024-11-21

## 2024-11-21 PROCEDURE — 99239 HOSP IP/OBS DSCHRG MGMT >30: CPT

## 2024-11-21 RX ORDER — ZINC OXIDE/BENZETHONIUM CHLOR
1 OINTMENT (GRAM) TOPICAL
Qty: 0 | Refills: 0 | DISCHARGE
Start: 2024-11-21

## 2024-11-21 RX ORDER — POLYETHYLENE GLYCOL 3350 17 G/17G
17 POWDER, FOR SOLUTION ORAL
Qty: 0 | Refills: 0 | DISCHARGE
Start: 2024-11-21

## 2024-11-21 RX ORDER — FUROSEMIDE 40 MG/1
1 TABLET ORAL
Refills: 0 | DISCHARGE

## 2024-11-21 RX ORDER — LINACLOTIDE 72 UG/1
1 CAPSULE, GELATIN COATED ORAL
Refills: 0 | DISCHARGE

## 2024-11-21 RX ORDER — SENNOSIDES 8.6 MG
2 TABLET ORAL
Qty: 60 | Refills: 0
Start: 2024-11-21 | End: 2024-12-20

## 2024-11-21 RX ORDER — CARVEDILOL 25 MG/1
1 TABLET, FILM COATED ORAL
Refills: 0 | DISCHARGE

## 2024-11-21 RX ORDER — MEMANTINE HYDROCHLORIDE 14 MG/1
1 CAPSULE, EXTENDED RELEASE ORAL
Refills: 0 | DISCHARGE

## 2024-11-21 RX ORDER — SENNOSIDES 8.6 MG
2 TABLET ORAL
Qty: 0 | Refills: 0 | DISCHARGE
Start: 2024-11-21

## 2024-11-21 RX ORDER — AMLODIPINE BESYLATE 10 MG/1
1 TABLET ORAL
Refills: 0 | DISCHARGE

## 2024-11-21 RX ORDER — ONDANSETRON HYDROCHLORIDE 4 MG/1
1 TABLET, FILM COATED ORAL
Qty: 3 | Refills: 0
Start: 2024-11-21 | End: 2024-12-20

## 2024-11-21 RX ORDER — LOSARTAN POTASSIUM 100 MG/1
1 TABLET, FILM COATED ORAL
Refills: 0 | DISCHARGE

## 2024-11-21 RX ORDER — POLYETHYLENE GLYCOL 3350 17 G/17G
17 POWDER, FOR SOLUTION ORAL
Qty: 1020 | Refills: 0
Start: 2024-11-21 | End: 2024-12-20

## 2024-11-21 RX ADMIN — Medication 2 TABLET(S): at 12:36

## 2024-11-21 RX ADMIN — CHLORHEXIDINE GLUCONATE 1 APPLICATION(S): 1.2 RINSE ORAL at 06:40

## 2024-11-21 RX ADMIN — Medication 1 APPLICATION(S): at 06:39

## 2024-11-21 NOTE — PROVIDER CONTACT NOTE (OTHER) - RECOMMENDATIONS
Notified Dr. Portillo and Dr. Tamez that under CMO, we cannot administer BP meds
Recommended replacement of rectal tube to prevent further distention
please reach out to GI to see if tube needs to be replaced. Also please check with attending if hernandez needs to be replaced.

## 2024-11-21 NOTE — DISCHARGE NOTE PROVIDER - NSDCMRMEDTOKEN_GEN_ALL_CORE_FT
amLODIPine 5 mg oral tablet: 1 tab(s) orally once a day  aspirin 81 mg oral tablet, chewable: 1 tab(s) orally once a day  carvedilol 25 mg oral tablet: 1 tab(s) orally 2 times a day  Lasix 20 mg oral tablet: 1 tab(s) orally once a day  Linzess 145 mcg oral capsule: 1 cap(s) orally once a day  losartan 25 mg oral tablet: 1 tab(s) orally once a day  memantine 7 mg oral capsule, extended release: 1 cap(s) orally once a day   bisacodyl 5 mg oral delayed release tablet: 2 tab(s) orally once a day (at bedtime)  ondansetron 8 mg oral tablet, disintegratin tab(s) sublingual every 8 hours as needed for  nausea  polyethylene glycol 3350 oral powder for reconstitution: 17 gram(s) orally every 12 hours  senna leaf extract oral tablet: 2 tab(s) orally once a day  zinc oxide 20% topical ointment: 1 Apply topically to affected area 2 times a day   bisacodyl 5 mg oral delayed release tablet: 2 tab(s) orally once a day (at bedtime)  morphine 20 mg/5 mL oral solution: 1 milliliter(s) orally every 4 hours as needed for  moderate pain FOR DYSPNEA OR MODERATE PAIN MDD: 6 ml  ondansetron 8 mg oral tablet, disintegratin tab(s) sublingual every 8 hours as needed for  nausea  polyethylene glycol 3350 oral powder for reconstitution: 17 gram(s) orally every 12 hours  senna leaf extract oral tablet: 2 tab(s) orally once a day  zinc oxide 20% topical ointment: 1 Apply topically to affected area 2 times a day

## 2024-11-21 NOTE — DISCHARGE NOTE PROVIDER - ATTENDING DISCHARGE PHYSICAL EXAMINATION:
Vital Signs Last 24 Hrs  T(C): --  T(F): --  HR: --  BP: --  BP(mean): --  RR: --  SpO2: --    NO NEW LABS     GEN: NAD / MAKES NEEDS KNOWN AND HAPPY TO GO HOME TODAY   CV: NL S1/2   RESP: CTA B/L   GI: +BS SOFT NT ND  EXT: NO E/C/C  MS: AOX2.5    DISCUSSED WITH NIECE WANT ONLY HOSPICE MEDS NO CHRONIC MEDS / HOSPICE CARE TO RESUME AT PT'S HOME FRIDAY AM (TOMORROW)

## 2024-11-21 NOTE — DISCHARGE NOTE PROVIDER - NSDCFUSCHEDAPPT_GEN_ALL_CORE_FT
Niyah Chiu  [facility]  [facility] [address] [facility]  Scheduled Appointment: 01/15/2025

## 2024-11-21 NOTE — CHART NOTE - NSCHARTNOTEFT_GEN_A_CORE
Discussed w/ gigi Pineda regarding continuing home medications. Discussed risks and benefits and explained that since patient is on hospice, the focus has transitioned to maintaining patient's comfort not medical management. Explained vital signs would no longer be monitored or adjusted. Gigi understands and would like to home meds like anti-hypertensives at this time and simply continue with medications to provide comfort.

## 2024-11-21 NOTE — DISCHARGE NOTE PROVIDER - CARE PROVIDER_API CALL
Sowmya Thomas  Nephrology  [address] [place] [address], [address] [address]  Phone: [phone]  Fax: [phone]  Established Patient  Follow Up Time: Routine

## 2024-11-21 NOTE — PROVIDER CONTACT NOTE (OTHER) - REASON
patient complained of pain
patient has rectal tube, ordered for enemas
right knee
patient is due for klor
Displacement of the rectal tube
potassium level
rectal tube
patient is receiving IV potassium
Pt is CMO, has non CMO meds ordered

## 2024-11-21 NOTE — DISCHARGE NOTE PROVIDER - HOSPITAL COURSE
89 yo F with hx of HTN, HLD, CHB s/p DC PPM s/p BiV ICD upgrade, Chronic A.fib (not on AC due to recurrent fall) and HFpEF (G3DD) presented to ED for generalized abdominal pain, constipation and hypokalemia.     # abd distention, Chronic intestinal pseudo-obstruction  # Acute Sigmoid Volvulus s/p sigmoidoscopy decompression   # Hypokalemia / Hypomagnesemia   #Acute Cystitis ruled out   #Chronic A.fib mpt on AC / HFimpEF s/p AICD BiV   # HTN  HLD     - symptoms resolved s/p sigmoidoscopy decompression and rectal tube   - was tolerating diet and passed BMs till 11/16, had abd distention and KUB showed increase distention  - s/p rectal tube 11/16 will continue till time to d/c home with hospice   - GI aware, was planned for c-scope, cant tolerate prep even w NGT,   - d/c daily KUBs   - c/w Rectal tube till d/c home GI can remove it before d/c   - c/w senna, miralax, dulcolax,   - d/c ASA/ Amlodipine / Losartan / atorvastatin / Coreg dec 12.5 mg w holding parameter    - all care per palliative recs / CMO status and No Lab draws / Oral Feeding advance diet as tolerated   - DNR/DNI. Patient elected for CMO. Plans for Home Hospice.  Patient to be discharged home with home hospice services.   89 yo F with hx of HTN, HLD, CHB s/p DC PPM s/p BiV ICD upgrade, Chronic A.fib (not on AC due to recurrent fall) and HFpEF (G3DD) presented to ED for generalized abdominal pain, constipation and hypokalemia.     # abd distention, Chronic intestinal pseudo-obstruction  # Acute Sigmoid Volvulus s/p sigmoidoscopy decompression   # Hypokalemia / Hypomagnesemia   #Acute Cystitis ruled out   #Chronic A.fib mpt on AC / HFimpEF s/p AICD BiV   # HTN  HLD     - symptoms resolved s/p sigmoidoscopy decompression and rectal tube   - was tolerating diet and passed BMs till 11/16, had abd distention and KUB showed increase distention  - s/p rectal tube 11/16 will continue till time to d/c home with hospice   - GI aware, was planned for c-scope, cant tolerate prep even w NGT,   - d/c daily KUBs   - c/w Rectal tube till d/c home GI can remove it before d/c   - c/w senna, miralax, dulcolax,   - d/c ASA/ Amlodipine / Losartan / atorvastatin / Coreg dec 12.5 mg w holding parameter    - all care per palliative recs / CMO status and No Lab draws / Oral Feeding advance diet as tolerated   - DNR/DNI. Patient elected for CMO. Plans for Home Hospice.  Patient to be discharged home with home hospice services.    Case, discharge, and d/c med rec discussed with and approved by attending Dr. Tmaez on 11/21/24.

## 2024-11-21 NOTE — PROVIDER CONTACT NOTE (OTHER) - BACKGROUND
patient received a tap water enema , patient did not have bowel movement.
Rectal tube placed due to acute sigmoid volvulus s/p sigmoid decompression.
Pt has active CMO order, vitals are not taken under a CMO order

## 2024-11-21 NOTE — PROVIDER CONTACT NOTE (OTHER) - ACTION/TREATMENT ORDERED:
as per MD will order PO dose.
as per DM do not give enemas.
As per Dr. Tamez, Dr. Portillo is to remove the CMO order from, the active pt orders, so that the pt can be treated prior to discharge and be discharged with her home meds
as per attending MD Jeremy jones, notified MD Diamond lou dc order.
Replacement of rectal tube.

## 2024-11-21 NOTE — DISCHARGE NOTE NURSING/CASE MANAGEMENT/SOCIAL WORK - PATIENT PORTAL LINK FT
You can access the FollowMyHealth Patient Portal offered by [facility] by registering at the following website: [email or web address]. By joining [employer]’s FollowMyHealth portal, you will also be able to view your health information using other applications (apps) compatible with our system.

## 2024-11-21 NOTE — DISCHARGE NOTE PROVIDER - NSDCCPCAREPLAN_GEN_ALL_CORE_FT
INR 3.0, at target. Obtained by [facility] RN. Patient denies any diet changes. Reviewed Anticoag Flowsheet. Teaching given regarding vit K foods and diet consistency, OTC meds. Onsite provider is Dr. Nichols. Patient arrived in wheelchair, accompanied by Family. Patient verbalizes understanding and agreement.    One ibuprofen taken 2 weeks ago, family and Patient aware of bleeding risk and advice regarding NSAIDs. No s/s of bleeding. Patient took Mucinex, no apparent interaction with warfarin per [facility]rom[facility].     Discussed SAME warfarin dose, using the 5mg tab: Take 1/2 tab on Mon, Wed, Fri, and take 1 tab the rest of the week. Recheck INR in 4 weeks.   PRINCIPAL DISCHARGE DIAGNOSIS  Diagnosis: Sigmoid volvulus  Assessment and Plan of Treatment: You were diagnosed with intestinal volvulus, a condition where a part of your intestine has twisted, leading to a blockage that can cause severe abdominal pain, bloating, and discomfort. This twisting can interfere with the blood supply to the affected portion of your intestine, potentially leading to more serious complications if not promptly addressed. Your symptoms, including significant abdominal pain, led to a thorough examination and the identification of this condition.  To address the blockage and relieve your symptoms, you underwent a sigmoidoscopy decompression. This procedure allowed your healthcare team to carefully untwist the affected portion of your intestine and alleviate the pressure caused by the volvulus. By restoring normal blood flow and intestinal function, this intervention aimed to reduce your discomfort and prevent further complications.     PRINCIPAL DISCHARGE DIAGNOSIS  Diagnosis: Sigmoid volvulus  Assessment and Plan of Treatment: You were diagnosed with intestinal volvulus, a condition where a part of your intestine has twisted, leading to a blockage that can cause severe abdominal pain, bloating, and discomfort. This twisting can interfere with the blood supply to the affected portion of your intestine, potentially leading to more serious complications if not promptly addressed. Your symptoms, including significant abdominal pain, led to a thorough examination and the identification of this condition.  To address the blockage and relieve your symptoms, you underwent a sigmoidoscopy decompression. This procedure allowed your healthcare team to carefully untwist the affected portion of your intestine and alleviate the pressure caused by the volvulus. By restoring normal blood flow and intestinal function, this intervention aimed to reduce your discomfort and prevent further complications.  YOU WANTED NO MEDICAL INTERVENTION FOR RECURRENT SIGMOID VOLULUS DUE TO BEING HIGH RISK AND PREFFERED HOSPICE CARE INSTEAD.  HOSPICE CARE TO RESUME AT YOUR HOME TOMORROW MORNING   BEST WISHES

## 2024-11-21 NOTE — PROVIDER CONTACT NOTE (OTHER) - DATE AND TIME:
10-Nov-2024 08:32
18-Nov-2024 05:12
14-Nov-2024 08:30
10-Nov-2024 18:03
13-Nov-2024 10:42
10-Nov-2024 13:41
10-Nov-2024 19:36
13-Nov-2024 11:42
21-Nov-2024 10:48

## 2024-11-21 NOTE — DISCHARGE NOTE NURSING/CASE MANAGEMENT/SOCIAL WORK - FINANCIAL ASSISTANCE
[facility] provides services at a reduced cost to those who are determined to be eligible through [facility]’s financial assistance program. Information regarding [facility]’s financial assistance program can be found by going to [email or web address] or by calling [phone].

## 2024-11-21 NOTE — PROVIDER CONTACT NOTE (OTHER) - SITUATION
Pt had scheduled blood pressure meds at 8 am
Provider contacted by fellow MIESHA Miranda. Upon cleaning patient, rectal tube became displaced and fell out of the placements rectum.
patient complaining of burning while potassium IV infusing
patient's rectal tube fell out. Patient has urinary cathter that seal is not intact.
patient has order for k lalo stat , patient also has order for 2 20meq IV Potassium
patient has rectal tube ordered for enemas 2x a day
Potassium level is back in labs, on low side
patient complained of pain to left leg and left side of stomach 5 out of 10

## 2024-11-21 NOTE — PROVIDER CONTACT NOTE (OTHER) - NAME OF MD/NP/PA/DO NOTIFIED:
Danelle
Diamond
Noshahabr
Danelle
Dr. Portillo, Dr. Tamez
Danelle
Noshahabr
Danelle
Dr. Efrain Malik

## 2024-11-27 DIAGNOSIS — I48.20 CHRONIC ATRIAL FIBRILLATION, UNSPECIFIED: ICD-10-CM

## 2024-11-27 DIAGNOSIS — Z86.718 PERSONAL HISTORY OF OTHER VENOUS THROMBOSIS AND EMBOLISM: ICD-10-CM

## 2024-11-27 DIAGNOSIS — E83.42 HYPOMAGNESEMIA: ICD-10-CM

## 2024-11-27 DIAGNOSIS — Z88.6 ALLERGY STATUS TO ANALGESIC AGENT: ICD-10-CM

## 2024-11-27 DIAGNOSIS — Z95.810 PRESENCE OF AUTOMATIC (IMPLANTABLE) CARDIAC DEFIBRILLATOR: ICD-10-CM

## 2024-11-27 DIAGNOSIS — K59.89 OTHER SPECIFIED FUNCTIONAL INTESTINAL DISORDERS: ICD-10-CM

## 2024-11-27 DIAGNOSIS — K56.2 VOLVULUS: ICD-10-CM

## 2024-11-27 DIAGNOSIS — E87.6 HYPOKALEMIA: ICD-10-CM

## 2024-11-27 DIAGNOSIS — Z51.5 ENCOUNTER FOR PALLIATIVE CARE: ICD-10-CM

## 2024-11-27 DIAGNOSIS — Z88.0 ALLERGY STATUS TO PENICILLIN: ICD-10-CM

## 2024-11-27 DIAGNOSIS — Z96.642 PRESENCE OF LEFT ARTIFICIAL HIP JOINT: ICD-10-CM

## 2024-11-27 DIAGNOSIS — I11.0 HYPERTENSIVE HEART DISEASE WITH HEART FAILURE: ICD-10-CM

## 2024-11-27 DIAGNOSIS — D64.9 ANEMIA, UNSPECIFIED: ICD-10-CM

## 2024-11-27 DIAGNOSIS — K56.7 ILEUS, UNSPECIFIED: ICD-10-CM

## 2024-11-27 DIAGNOSIS — E78.00 PURE HYPERCHOLESTEROLEMIA, UNSPECIFIED: ICD-10-CM

## 2024-11-27 DIAGNOSIS — Z91.81 HISTORY OF FALLING: ICD-10-CM

## 2024-11-27 DIAGNOSIS — K59.00 CONSTIPATION, UNSPECIFIED: ICD-10-CM

## 2024-11-27 DIAGNOSIS — Z87.81 PERSONAL HISTORY OF (HEALED) TRAUMATIC FRACTURE: ICD-10-CM

## 2024-11-27 DIAGNOSIS — I50.32 CHRONIC DIASTOLIC (CONGESTIVE) HEART FAILURE: ICD-10-CM

## 2025-01-15 ENCOUNTER — APPOINTMENT (OUTPATIENT)
Dept: ELECTROPHYSIOLOGY | Facility: CLINIC | Age: 89
End: 2025-01-15